# Patient Record
Sex: MALE | Race: WHITE | NOT HISPANIC OR LATINO | Employment: PART TIME | URBAN - NONMETROPOLITAN AREA
[De-identification: names, ages, dates, MRNs, and addresses within clinical notes are randomized per-mention and may not be internally consistent; named-entity substitution may affect disease eponyms.]

---

## 2017-04-27 ENCOUNTER — HOSPITAL ENCOUNTER (EMERGENCY)
Facility: HOSPITAL | Age: 27
Discharge: HOME | End: 2017-04-27
Attending: EMERGENCY MEDICINE | Admitting: EMERGENCY MEDICINE
Payer: OTHER MISCELLANEOUS

## 2017-04-27 VITALS
RESPIRATION RATE: 24 BRPM | OXYGEN SATURATION: 97 % | DIASTOLIC BLOOD PRESSURE: 69 MMHG | HEART RATE: 85 BPM | TEMPERATURE: 97.1 F | SYSTOLIC BLOOD PRESSURE: 139 MMHG

## 2017-04-27 DIAGNOSIS — S05.01XA CORNEAL ABRASION, RIGHT, INITIAL ENCOUNTER: ICD-10-CM
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
## 2017-04-27 PROCEDURE — 70480 CT ORBIT/EAR/FOSSA W/O DYE: CPT | Mod: TC

## 2017-04-27 PROCEDURE — 99284 EMERGENCY DEPT VISIT MOD MDM: CPT | Mod: 25

## 2017-04-27 PROCEDURE — 99284 EMERGENCY DEPT VISIT MOD MDM: CPT | Performed by: EMERGENCY MEDICINE

## 2017-04-27 RX ORDER — NAPROXEN 500 MG/1
500 TABLET ORAL 2 TIMES DAILY WITH MEALS
Qty: 16 TABLET | Refills: 0 | Status: SHIPPED | OUTPATIENT
Start: 2017-04-27 | End: 2017-05-05

## 2017-04-27 RX ORDER — TRAMADOL HYDROCHLORIDE 50 MG/1
50 TABLET ORAL EVERY 6 HOURS PRN
Qty: 10 TABLET | Refills: 0 | Status: SHIPPED | OUTPATIENT
Start: 2017-04-27 | End: 2017-05-13

## 2017-04-27 RX ORDER — SULFACETAMIDE SODIUM 100 MG/ML
1 SOLUTION/ DROPS OPHTHALMIC
Qty: 1 BOTTLE | Refills: 0 | Status: SHIPPED | OUTPATIENT
Start: 2017-04-27 | End: 2017-05-04

## 2017-04-27 ASSESSMENT — ENCOUNTER SYMPTOMS
EYE DISCHARGE: 1
ACTIVITY CHANGE: 1
EYE REDNESS: 1
PHOTOPHOBIA: 1
EYE PAIN: 1

## 2017-04-27 NOTE — DISCHARGE INSTRUCTIONS
Corneal Abrasion    You have received a scratch or scrape (abrasion) to your cornea. The cornea is the clear part in the front of the eye. This sensitive area is very painful when injured. You may make tears frequently, and your vision may be blurry until the injury heals. You may be sensitive to light.  This part of the body heals quickly. You can expect the pain to go away within 24 to 48 hours. If the abrasion is large or deep, your doctor may apply an eye patch, although this is not always done. An antibiotic ointment or eye drops may also be used to prevent infection.  Numbing drops may be used to relieve the pain temporarily so that your eyes can be examined. However, these drops cannot be prescribed for home use because that would prevent healing and lead to more serious problems. Also, if you can t feel your eye, there is a chance of accidentally injuring it further without knowing it.  Home care     A cold pack (ice in a plastic bag, wrapped in a towel) may be applied over the eye (or eye patch) for 20 minutes at a time, to reduce pain.    You may use acetaminophen or ibuprofen to control pain, unless another pain medicine was prescribed. Note: If you have chronic liver or kidney disease or ever had a stomach ulcer or GI bleeding, talk with your doctor before using these medicines.    Rest your eyes and do not read until symptoms are gone.    If you use contact lenses, do not wear them until all symptoms are gone.    If your vision is affected by the corneal abrasion or if an eye patch was applied, do not drive a motor vehicle or operate machinery until all symptoms are gone. You may have trouble judging distances using only one eye.    If your eyes are sensitive to light, try wearing sunglasses, or stay indoors until symptoms go away.  Follow-up care  Follow up with your health care provider, or as advised.    If no patch was put on your eye, and used but the pain continues for more than 48 hours, you  should have another exam. Return to this facility or contact your health care provider to arrange this.    If your eye was patched and you were asked to remove the patch yourself, see your health care provider. You may also return to this facility if you still have pain after the patch is removed.    If you were given a return appointment for patch removal and re-examination, be sure to keep the appointment. Leaving the patch in place longer than advised could be harmful.  When to seek medical advice  Call your health care provider right away if any of these occur.    Increasing eye pain or pain that does not improve after 24 hours    Discharge from the eye    Increasing redness of the eye or swelling of the eyelids    Worsening vision     Symptoms that worsen after the abrasion has healed     9012-1934 [employer]. [address] [place] [address], [place] [address]. All rights reserved. This information is not intended as a substitute for professional medical care. Always follow your healthcare professional's instructions.          Corneal Injury  An eye injury can hurt your cornea. Your cornea is the clear layer on the front of your eye. It protects your eye from dust and germs, and helps filter out harmful UV (ultraviolet) rays. The cornea also helps to focus light entering your eye. Your cornea is made of strong proteins, but it can be damaged. A slight cut or scratch (abrasion) to the cornea can be very painful. But that is often minor and can heal within 1 or 2 days. A bad abrasion or a hole (puncture) in the cornea can be very serious. These are medical emergencies.     A blow to the eye can cause damage to the cornea (front layer of the eye).   Something in your eye  If you think you have something small in your eye, flush it with water right away. Pull your upper lid out and over your bottom lid. This will help increase the flow of tears across your eye. If these methods don t work, call your  healthcare provider. Never try to remove an object from your eye that doesn t flush out easily with water. Doing so may cause more damage.  When to go to the emergency room (ER)  Call 911 or your local emergency number if you have:    Severe eye pain    A puncture injury or bad abrasion    Something in your eye that you can t flush out with water    A very swollen or painful eye after removing an object    A chemical burn    An object embedded in your eye. (Cover both eyes with a sterile compress and keep both eyes closed while you wait for help. DO NOT put any pressure on your eyes.)  What to expect in the ER  For minor abrasions   Minor abrasions are usually treated with eye drops or ointment. You may be given antibiotics to prevent infection. Most abrasions heal in 1 or 2 days. To help rule out more serious injuries, you may have tests including:    A standard eye exam to check how well you can see    A Rocio test, which uses a special dye to look for severe eye damage  Depending on the results of these tests, you may be referred to an eye specialist (ophthalmologist).  For serious abrasions or punctures  You will be referred directly to an ophthalmologist for emergency treatment. An eye specialist is needed to reduce further damage and possible vision loss.    5162-7015 [employer]. [address] [place] [address], [place] [address]. All rights reserved. This information is not intended as a substitute for professional medical care. Always follow your healthcare professional's instructions.      Eddi,  You apparently had some metal or dust cause quite an abrasion across your cental visual area on your right cornea.  This should heal nicely but if there is any ongoing pain or significant redness in the eye come Monday, get in to see an eye doc.  Use 2 extra strength tylenol + the naprosyn twice per day with the pain pill if you need it and the eye drops.  It won't feel perfect but you should be able  to work.  But make sure you shield your eyes with safety glasses or whatever safety you have.  Good luck!

## 2017-04-27 NOTE — ED PROVIDER NOTES
History     Chief Complaint   Patient presents with     Foreign Body in Eye     right eye pain, foreign body possibly metal from work in right eye, since 0250 am.  pt was evaluated in [place] ED and discharged.  about 20 minutes ago he felt something move to back of his eye, vision is foggy in right eye     HPI  Eddi Trejo is a 27 year old male with the above hx.  Works for [employer] as a  here in [place] at the various [employer] relining [employer]ns.  He apparently had a blow back of material including metal in his face early this AM, went to the [place] ER for eval but has had intense continuous pain and comes in now here for second opinion, worried that he has metal that has been driven into his. Feels so tense from the pain, that he is on the verge of developing a migraine.     I have reviewed the Medications, Allergies, Past Medical and Surgical History, and Social History in the Epic system.    Review of Systems   Constitutional: Positive for activity change.   HENT: Negative.    Eyes: Positive for photophobia, pain, discharge, redness and visual disturbance.     Physical Exam   BP: 139/69  Pulse: 85  Temp: 97.1  F (36.2  C)  Resp: 24  SpO2: 97 %  Physical Exam   Constitutional: He appears well-developed and well-nourished. He appears distressed.   Upset angry fellow with foul adjectives as we tried to instill some ophthaine for his comfort.     HENT:   Head: Normocephalic and atraumatic.   Eyes: Pupils are equal, round, and reactive to light. Right eye exhibits discharge. Left eye exhibits discharge.   Blepharospasm, epiphora, intense conjunctival injection, no obvious fb in scleral or palpebral area and under upper tarsal plate.     Neck: Normal range of motion. Neck supple.   Cardiovascular: Normal rate.    Pulmonary/Chest: Effort normal.   Musculoskeletal: Normal range of motion.   Neurological: He is alert.   Skin: Skin is warm. He is not diaphoretic.     ED Course     ED  Course     Procedures  Critical Care time:  none  Labs Ordered and Resulted from Time of ED Arrival Up to the Time of Departure from the ED - No data to display    Assessments & Plan (with Medical Decision Making)   Eddi has a work related eye injury with metallic exposue.  Orbital fine cut CT of the left globe/orbit revealed no metal.  After ubaldo of more opthaine and repeat inspection, fluorescein stain used to identify a central visual axis 2-3mm circular abrasion.  Irrigated clear. Meds for pain and abx prophy below.  Advised no work for tonite if discomfort but should be healed enough by tomorrow nite.  Should see eye MD if symptoms persist in 3 days.   I have reviewed the nursing notes.    I have reviewed the findings, diagnosis, plan and need for follow up with the patient.    New Prescriptions    NAPROXEN (NAPROSYN) 500 MG TABLET    Take 1 tablet (500 mg) by mouth 2 times daily (with meals) for 8 days    SULFACETAMIDE (BLEPH-10) 10 % OPHTHALMIC SOLUTION    Apply 1 drop to eye every 4 hours (while awake) for 7 days    TRAMADOL (ULTRAM) 50 MG TABLET    Take 1 tablet (50 mg) by mouth every 6 hours as needed for pain Maximum 4 tablet(s) per day       Final diagnoses:   Work related injury   Corneal abrasion, right, initial encounter       4/27/2017   [facility]     Austin Monteiro MD  04/27/17 2020

## 2017-04-27 NOTE — ED NOTES
Discharge instructions reviewed with patient, and patient verbalized understanding. Pt declining discharge vital signs at this time. Rx given to patient. Patient verbalized understanding. Pt ambulated with a steady gait to the exit.

## 2017-04-27 NOTE — ED AVS SNAPSHOT
[facility]    [address]    [place] [address]    Phone:  [phone]                                       Eddi Trejo   MRN: [id number]    Department:  [facility]   Date of Visit:  4/27/2017           Patient Information     Date Of Birth          1990        Your diagnoses for this visit were:     Work related injury     Corneal abrasion, right, initial encounter        You were seen by Austin Monteiro MD.      Follow-up Information     Follow up with Corrie Chirinos MD.    Specialty:  Family Practice    Why:  As needed    Contact information:    [facility]  [address]  [place] [address]  [phone]          Discharge Instructions         Corneal Abrasion    You have received a scratch or scrape (abrasion) to your cornea. The cornea is the clear part in the front of the eye. This sensitive area is very painful when injured. You may make tears frequently, and your vision may be blurry until the injury heals. You may be sensitive to light.  This part of the body heals quickly. You can expect the pain to go away within 24 to 48 hours. If the abrasion is large or deep, your doctor may apply an eye patch, although this is not always done. An antibiotic ointment or eye drops may also be used to prevent infection.  Numbing drops may be used to relieve the pain temporarily so that your eyes can be examined. However, these drops cannot be prescribed for home use because that would prevent healing and lead to more serious problems. Also, if you can t feel your eye, there is a chance of accidentally injuring it further without knowing it.  Home care     A cold pack (ice in a plastic bag, wrapped in a towel) may be applied over the eye (or eye patch) for 20 minutes at a time, to reduce pain.    You may use acetaminophen or ibuprofen to control pain, unless another pain medicine was prescribed. Note: If you have chronic liver or kidney disease or ever  had a stomach ulcer or GI bleeding, talk with your doctor before using these medicines.    Rest your eyes and do not read until symptoms are gone.    If you use contact lenses, do not wear them until all symptoms are gone.    If your vision is affected by the corneal abrasion or if an eye patch was applied, do not drive a motor vehicle or operate machinery until all symptoms are gone. You may have trouble judging distances using only one eye.    If your eyes are sensitive to light, try wearing sunglasses, or stay indoors until symptoms go away.  Follow-up care  Follow up with your health care provider, or as advised.    If no patch was put on your eye, and used but the pain continues for more than 48 hours, you should have another exam. Return to this facility or contact your health care provider to arrange this.    If your eye was patched and you were asked to remove the patch yourself, see your health care provider. You may also return to this facility if you still have pain after the patch is removed.    If you were given a return appointment for patch removal and re-examination, be sure to keep the appointment. Leaving the patch in place longer than advised could be harmful.  When to seek medical advice  Call your health care provider right away if any of these occur.    Increasing eye pain or pain that does not improve after 24 hours    Discharge from the eye    Increasing redness of the eye or swelling of the eyelids    Worsening vision     Symptoms that worsen after the abrasion has healed     2716-3135 [employer]. [address] [place] [address], [place] [address]. All rights reserved. This information is not intended as a substitute for professional medical care. Always follow your healthcare professional's instructions.          Corneal Injury  An eye injury can hurt your cornea. Your cornea is the clear layer on the front of your eye. It protects your eye from dust and germs, and helps filter out  harmful UV (ultraviolet) rays. The cornea also helps to focus light entering your eye. Your cornea is made of strong proteins, but it can be damaged. A slight cut or scratch (abrasion) to the cornea can be very painful. But that is often minor and can heal within 1 or 2 days. A bad abrasion or a hole (puncture) in the cornea can be very serious. These are medical emergencies.     A blow to the eye can cause damage to the cornea (front layer of the eye).   Something in your eye  If you think you have something small in your eye, flush it with water right away. Pull your upper lid out and over your bottom lid. This will help increase the flow of tears across your eye. If these methods don t work, call your healthcare provider. Never try to remove an object from your eye that doesn t flush out easily with water. Doing so may cause more damage.  When to go to the emergency room (ER)  Call 911 or your local emergency number if you have:    Severe eye pain    A puncture injury or bad abrasion    Something in your eye that you can t flush out with water    A very swollen or painful eye after removing an object    A chemical burn    An object embedded in your eye. (Cover both eyes with a sterile compress and keep both eyes closed while you wait for help. DO NOT put any pressure on your eyes.)  What to expect in the ER  For minor abrasions   Minor abrasions are usually treated with eye drops or ointment. You may be given antibiotics to prevent infection. Most abrasions heal in 1 or 2 days. To help rule out more serious injuries, you may have tests including:    A standard eye exam to check how well you can see    A Rocio test, which uses a special dye to look for severe eye damage  Depending on the results of these tests, you may be referred to an eye specialist (ophthalmologist).  For serious abrasions or punctures  You will be referred directly to an ophthalmologist for emergency treatment. An eye specialist is needed to  reduce further damage and possible vision loss.    6063-0502 [employer]. [address] [place] [address], [place] [address]. All rights reserved. This information is not intended as a substitute for professional medical care. Always follow your healthcare professional's instructions.      Eddi,  You apparently had some metal or dust cause quite an abrasion across your cental visual area on your right cornea.  This should heal nicely but if there is any ongoing pain or significant redness in the eye come Monday, get in to see an eye doc.  Use 2 extra strength tylenol + the naprosyn twice per day with the pain pill if you need it and the eye drops.  It won't feel perfect but you should be able to work.  But make sure you shield your eyes with safety glasses or whatever safety you have.  Good luck!       Review of your medicines      START taking        Dose / Directions Last dose taken    naproxen 500 MG tablet   Commonly known as:  NAPROSYN   Dose:  500 mg   Quantity:  16 tablet        Take 1 tablet (500 mg) by mouth 2 times daily (with meals) for 8 days   Refills:  0        sulfacetamide 10 % ophthalmic solution   Commonly known as:  BLEPH-10   Dose:  1 drop   Quantity:  1 Bottle        Apply 1 drop to eye every 4 hours (while awake) for 7 days   Refills:  0        traMADol 50 MG tablet   Commonly known as:  ULTRAM   Dose:  50 mg   Quantity:  10 tablet        Take 1 tablet (50 mg) by mouth every 6 hours as needed for pain Maximum 4 tablet(s) per day   Refills:  0          Our records show that you are taking the medicines listed below. If these are incorrect, please call your family doctor or clinic.        Dose / Directions Last dose taken    azithromycin 250 MG tablet   Commonly known as:  ZITHROMAX   Quantity:  6 tablet        Two tablets first day, then one tablet daily for four days.   Refills:  0        fluticasone 50 MCG/ACT spray   Commonly known as:  FLONASE   Dose:  2 spray   Quantity:  16 g  "       Spray 2 sprays into both nostrils 2 times daily   Refills:  11        VITAMIN C PO   Dose:  1000 mg        Take 1,000 mg by mouth daily   Refills:  0                Prescriptions were sent or printed at these locations (3 Prescriptions)                   Other Prescriptions                Printed at Department/Unit printer (3 of 3)         sulfacetamide (BLEPH-10) 10 % ophthalmic solution               naproxen (NAPROSYN) 500 MG tablet               traMADol (ULTRAM) 50 MG tablet                Procedures and tests performed during your visit     CT Temporal Orbital Sella w/o Contrast      Orders Needing Specimen Collection     None      Pending Results     Date and Time Order Name Status Description    2017 1647 CT Temporal Orbital Sella w/o Contrast In process             Pending Culture Results     No orders found from 2017 to 2017.            Thank you for choosing [facility]       Thank you for choosing [facility] for your care. Our goal is always to provide you with excellent care. Hearing back from our patients is one way we can continue to improve our services. Please take a few minutes to complete the written survey that you may receive in the mail after you visit with us. Thank you!        [employer] Information     [employer] lets you send messages to your doctor, view your test results, renew your prescriptions, schedule appointments and more. To sign up, go to [email or web address] . Click on \"Log in\" on the left side of the screen, which will take you to the Welcome page. Then click on \"Sign up Now\" on the right side of the page.     You will be asked to enter the access code listed below, as well as some personal information. Please follow the directions to create your username and password.     Your access code is: [email or web address]  Expires: 2017  5:37 PM     Your access code will  in 90 days. If you need help or a new code, please call your [facility] clinic or [phone].      "   Care EveryWhere ID     This is your Care EveryWhere ID. This could be used by other organizations to access your [facility] medical records  [id number]        After Visit Summary       This is your record. Keep this with you and show to your community pharmacist(s) and doctor(s) at your next visit.

## 2017-04-27 NOTE — ED AVS SNAPSHOT
[facility]    [address]    [place] [address]    Phone:  [phone]                                       Eddi Trejo   MRN: [id number]    Department:  [facility]   Date of Visit:  4/27/2017           After Visit Summary Signature Page     I have received my discharge instructions, and my questions have been answered. I have discussed any challenges I see with this plan with the nurse or doctor.    ..........................................................................................................................................  Patient/Patient Representative Signature      ..........................................................................................................................................  Patient Representative Print Name and Relationship to Patient    ..................................................               ................................................  Date                                            Time    ..........................................................................................................................................  Reviewed by Signature/Title    ...................................................              ..............................................  Date                                                            Time

## 2017-04-28 NOTE — PROGRESS NOTES
CT Scan of Orbits report routed to PCP, Dr. SILVIA Chirinos.  Impression - droplet of gas seen above the lens of the right eye, possibility of a small tear in the sclera.  Should see eye MD if symptoms persist in 3 days.

## 2017-05-13 ENCOUNTER — HOSPITAL ENCOUNTER (EMERGENCY)
Facility: HOSPITAL | Age: 27
Discharge: HOME | End: 2017-05-13
Attending: PHYSICIAN ASSISTANT | Admitting: PHYSICIAN ASSISTANT
Payer: COMMERCIAL

## 2017-05-13 VITALS
HEART RATE: 68 BPM | TEMPERATURE: 97.9 F | DIASTOLIC BLOOD PRESSURE: 95 MMHG | RESPIRATION RATE: 12 BRPM | OXYGEN SATURATION: 98 % | SYSTOLIC BLOOD PRESSURE: 143 MMHG

## 2017-05-13 DIAGNOSIS — M25.462 KNEE EFFUSION, LEFT: ICD-10-CM

## 2017-05-13 DIAGNOSIS — S86.912A STRAIN OF LEFT KNEE, INITIAL ENCOUNTER: ICD-10-CM

## 2017-05-13 PROCEDURE — 99213 OFFICE O/P EST LOW 20 MIN: CPT | Performed by: PHYSICIAN ASSISTANT

## 2017-05-13 PROCEDURE — 73562 X-RAY EXAM OF KNEE 3: CPT | Mod: TC,LT

## 2017-05-13 PROCEDURE — 99213 OFFICE O/P EST LOW 20 MIN: CPT

## 2017-05-13 ASSESSMENT — ENCOUNTER SYMPTOMS
CONSTITUTIONAL NEGATIVE: 1
CARDIOVASCULAR NEGATIVE: 1
ARTHRALGIAS: 1
PSYCHIATRIC NEGATIVE: 1
NEUROLOGICAL NEGATIVE: 1
JOINT SWELLING: 1
MYALGIAS: 1

## 2017-05-13 NOTE — ED AVS SNAPSHOT
[facility]    [address]    [place] [address]    Phone:  [phone]                                       Eddi Trejo   MRN: [id number]    Department:  [facility]   Date of Visit:  5/13/2017           Patient Information     Date Of Birth          1990        Your diagnoses for this visit were:     Knee effusion, left     Strain of left knee, initial encounter        You were seen by Loretta Paredes PA.      Follow-up Information     Follow up with Corrie Chirinos MD.    Specialty:  Family Practice    Why:  If symptoms worsen, prior to your Orthopedic Consultation    Contact information:    [facility] [place]  [address]  [place] [address] [phone]          Follow up with [facility].    Specialty:  EMERGENCY MEDICINE    Why:  If further concerns develop    Contact information:    [address] [address] [phone]    Additional information:    From [facility]: Take [address]. Turn left at [address]. Turn left at the first stoplight on [address]. At the first stop sign, take a right onto [address]. Take a left into the parking lot and continue through until you reach the North enterance of the building.       From [place]: Take [address]. Take the [address] ramp towards [place]. Turn left onto [address]. Take a slight right onto [address]. Turn left at the first stoplight on [address]. At the first stop sign, take a right onto [address]. Take a left into the parking lot and continue through until you reach the North enterance of the building.       From [place]: Take [address]. Take a right at [address]. At the first stop sign, take a right onto [address]. Take a left into the parking lot and continue through until you reach the North enterance of the building.         Discharge Instructions       Motrin  "800 mg every 8 hours for pain/swelling.    Discharge References/Attachments     ACE WRAP (ENGLISH)    KNEE EFFUSION (ENGLISH)    KNEE PAIN AND SWELLING, REDUCING (ENGLISH)    CRUTCHES (WEIGHT-BEARING), DISCHARGE INSTRUCTIONS (ENGLISH)      ED Discharge Orders     ORTHOPEDICS ADULT REFERRAL       Your provider has referred you to: [facility]    Please be aware that coverage of these services is subject to the terms and limitations of your health insurance plan.  Call member services at your health plan with any benefit or coverage questions.      Please bring the following to your appointment:    >>   Any x-rays, CTs or MRIs which have been performed.  Contact the facility where they were done to arrange for  prior to your scheduled appointment.    >>   List of current medications   >>   This referral request   >>   Any documents/labs given to you for this referral                     Review of your medicines      Notice     You have not been prescribed any medications.            Procedures and tests performed during your visit     Knee XR, 3 views, left      Orders Needing Specimen Collection     None      Pending Results     Date and Time Order Name Status Description    5/13/2017 1557 Knee XR, 3 views, left In process             Pending Culture Results     No orders found from 5/11/2017 to 5/14/2017.            Thank you for choosing [facility]       Thank you for choosing [facility] for your care. Our goal is always to provide you with excellent care. Hearing back from our patients is one way we can continue to improve our services. Please take a few minutes to complete the written survey that you may receive in the mail after you visit with us. Thank you!        [employer] Information     [employer] lets you send messages to your doctor, view your test results, renew your prescriptions, schedule appointments and more. To sign up, go to [email or web address] . Click on \"Log in\" on the left side " "of the screen, which will take you to the Welcome page. Then click on \"Sign up Now\" on the right side of the page.     You will be asked to enter the access code listed below, as well as some personal information. Please follow the directions to create your username and password.     Your access code is: [email or web address]  Expires: 2017  5:37 PM     Your access code will  in 90 days. If you need help or a new code, please call your [facility] clinic or [phone].        Care EveryWhere ID     This is your Care EveryWhere ID. This could be used by other organizations to access your [facility] medical records  [id number]        After Visit Summary       This is your record. Keep this with you and show to your community pharmacist(s) and doctor(s) at your next visit.                  "

## 2017-05-13 NOTE — LETTER
[facility]  [address]  [place] [address]  Phone: [phone]    May 13, 2017        Eddi Trejo  [address]  [facility] [address]          To whom it may concern:    RE: Eddi Trejo    Patient was seen and treated today at our clinic.    Due to injury, Adrien requires crutches for ambulation through 23 May 2017.    Any further work limitations to be completed by your Primary Care or Orthopedic Provider.      Sincerely,      Loretta Paredes Certified  Physician Assistant  5/13/2017  4:26 PM  URGENT CARE CLINIC

## 2017-05-13 NOTE — ED PROVIDER NOTES
"  History     Chief Complaint   Patient presents with     Knee Pain     Left knee pain since yesterday. Patient states, \"I think it slipped out of place.\"     The history is provided by the patient. No  was used.     Eddi Trejo is a 27 year old male who has acute exacerbation of chronic left knee pain. Denies direct trauma. States he was sitting, while driving. States he went to stretch his left leg and he felt like his left knee \"popped out of place.\" since then he has sharp lateral knee pain which goes down the lateral side of his calf.     Allergies as of 2017 - Kaden as Reviewed 2017   Allergen Reaction Noted     Penicillins  2013     Patient's Medications   New Prescriptions    No medications on file   Previous Medications    No medications on file   Modified Medications    No medications on file   Discontinued Medications    ASCORBIC ACID (VITAMIN C PO)    Take 1,000 mg by mouth daily    AZITHROMYCIN (ZITHROMAX) 250 MG TABLET    Two tablets first day, then one tablet daily for four days.    FLUTICASONE (FLONASE) 50 MCG/ACT NASAL SPRAY    Spray 2 sprays into both nostrils 2 times daily    TRAMADOL (ULTRAM) 50 MG TABLET    Take 1 tablet (50 mg) by mouth every 6 hours as needed for pain Maximum 4 tablet(s) per day     Past Medical History:   Diagnosis Date     Bronchitis 02     Headache 4/28/10     Open wound of forehead 4/28/10     Otitis 02     Schizophrenia (H) 4/28/10     URI (upper respiratory infection) 95     Past Surgical History:   Procedure Laterality Date     HC CREATE EARDRUM OPENING,GEN ANESTH      PE tubes as child     Family History   Problem Relation Age of Onset     C.A.D. No family hx of      Hypertension No family hx of      Colon Cancer Maternal Grandfather       at 58     Breast Cancer Maternal Grandmother      DIABETES Paternal Grandmother      DIABETES Paternal Aunt      DIABETES Maternal Aunt      Asthma Mother      Social " History     Social History     Marital status: Single     Spouse name: N/A     Number of children: N/A     Years of education: N/A     Social History Main Topics     Smoking status: Current Every Day Smoker     Packs/day: 1.00     Types: Cigarettes     Smokeless tobacco: Never Used     Alcohol use No     Drug use: No     Sexual activity: Not Asked     Other Topics Concern     Parent/Sibling W/ Cabg, Mi Or Angioplasty Before 65f 55m? No     Social History Narrative         I have reviewed the Medications, Allergies, Past Medical and Surgical History, and Social History in the Epic system.    Review of Systems   Constitutional: Negative.    Cardiovascular: Negative.    Musculoskeletal: Positive for arthralgias, joint swelling and myalgias.   Neurological: Negative.    Psychiatric/Behavioral: Negative.        Physical Exam   BP: 143/95  Pulse: 68  Temp: 97.9  F (36.6  C)  Resp: 12  SpO2: 98 %  Physical Exam   Constitutional: He is oriented to person, place, and time. He appears well-developed and well-nourished. No distress.   Cardiovascular: Normal rate.    Pulmonary/Chest: Effort normal.   Musculoskeletal:   Left knee: neg v/v/d. M/n/v intact. 3/5 strength due to pain. Medial effusion noted with moderate TTP. Moderate TTP along the lateral joint line.    Neurological: He is alert and oriented to person, place, and time.   Skin: He is not diaphoretic.   Psychiatric: He has a normal mood and affect.   Nursing note and vitals reviewed.      ED Course     ED Course     Procedures      left knee xray:  No acute process noted,  Pending official rad results    Ace wrap and fitted for and educated on crutches.      Assessments & Plan (with Medical Decision Making)     I have reviewed the nursing notes.    I have reviewed the findings, diagnosis, plan and need for follow up with the patient.    Final diagnoses:   Knee effusion, left   Strain of left knee, initial encounter         Elevate injured area above heart as often as  possible and when resting. Take OTC motrin 800 mg every 8 hours as needed for pain/swelling. Apply ice at least three times a day x 20 minutes.   Patient verbally educated and given appropriate education sheets for the diagnoses and has no questions.  Wear ace wrap and crutches x 10 days. Note given. All further limitations to be completed by PCP or Orthopedic provider.  Orthopedic Consult ordered  Follow up with your Primary Care provider if symptoms increase or if concerns develop, return to the ER  Loretta Paredes Certified   Physician Assistant  5/13/2017  4:32 PM  UR[facility]ENT CARE CLINIC    5/13/2017   [facility]     Loretta Paredes PA  05/13/17 1637       Loretta Paredes PA  05/13/17 1637

## 2017-05-13 NOTE — ED NOTES
Patient came in with left knee pain 8/10. He thinks he popped his knee out of place. Been taking tylenol/ Ibuprofen for the pain.

## 2017-05-13 NOTE — ED AVS SNAPSHOT
[facility]    [address]    [place] [address]    Phone:  [phone]                                       Eddi Trejo   MRN: [id number]    Department:  [facility]   Date of Visit:  5/13/2017           After Visit Summary Signature Page     I have received my discharge instructions, and my questions have been answered. I have discussed any challenges I see with this plan with the nurse or doctor.    ..........................................................................................................................................  Patient/Patient Representative Signature      ..........................................................................................................................................  Patient Representative Print Name and Relationship to Patient    ..................................................               ................................................  Date                                            Time    ..........................................................................................................................................  Reviewed by Signature/Title    ...................................................              ..............................................  Date                                                            Time

## 2017-05-17 ENCOUNTER — TELEPHONE (OUTPATIENT)
Dept: ORTHOPEDICS | Facility: OTHER | Age: 27
End: 2017-05-17

## 2017-05-17 NOTE — TELEPHONE ENCOUNTER
Marielle, not sure if this is work comp. Would you be able to look into it for me. Was seen in the ER. I do have him scheduled with   Raul Gillis on 5/23/17 pacheco Hooper

## 2017-07-07 ENCOUNTER — HOSPITAL ENCOUNTER (EMERGENCY)
Facility: HOSPITAL | Age: 27
Discharge: HOME | End: 2017-07-07
Attending: NURSE PRACTITIONER | Admitting: NURSE PRACTITIONER
Payer: COMMERCIAL

## 2017-07-07 VITALS
TEMPERATURE: 97.2 F | RESPIRATION RATE: 16 BRPM | SYSTOLIC BLOOD PRESSURE: 137 MMHG | OXYGEN SATURATION: 99 % | DIASTOLIC BLOOD PRESSURE: 86 MMHG

## 2017-07-07 DIAGNOSIS — G89.29 CHRONIC PAIN OF LEFT KNEE: ICD-10-CM

## 2017-07-07 DIAGNOSIS — M25.562 CHRONIC PAIN OF LEFT KNEE: ICD-10-CM

## 2017-07-07 PROCEDURE — 25000128 H RX IP 250 OP 636: Performed by: NURSE PRACTITIONER

## 2017-07-07 PROCEDURE — 99213 OFFICE O/P EST LOW 20 MIN: CPT | Performed by: NURSE PRACTITIONER

## 2017-07-07 PROCEDURE — 96372 THER/PROPH/DIAG INJ SC/IM: CPT

## 2017-07-07 PROCEDURE — 73562 X-RAY EXAM OF KNEE 3: CPT | Mod: TC,LT

## 2017-07-07 PROCEDURE — 99214 OFFICE O/P EST MOD 30 MIN: CPT | Mod: 25

## 2017-07-07 RX ORDER — NAPROXEN 500 MG/1
500 TABLET ORAL 2 TIMES DAILY WITH MEALS
Qty: 20 TABLET | Refills: 0 | Status: SHIPPED | OUTPATIENT
Start: 2017-07-07 | End: 2017-11-09

## 2017-07-07 RX ORDER — KETOROLAC TROMETHAMINE 30 MG/ML
60 INJECTION, SOLUTION INTRAMUSCULAR; INTRAVENOUS ONCE
Status: COMPLETED | OUTPATIENT
Start: 2017-07-07 | End: 2017-07-07

## 2017-07-07 RX ADMIN — KETOROLAC TROMETHAMINE 60 MG: 30 INJECTION, SOLUTION INTRAMUSCULAR at 12:32

## 2017-07-07 ASSESSMENT — ENCOUNTER SYMPTOMS
WOUND: 0
COLOR CHANGE: 0
ARTHRALGIAS: 1
NUMBNESS: 0
CONSTITUTIONAL NEGATIVE: 1

## 2017-07-07 NOTE — ED AVS SNAPSHOT
[facility]    [address] [address]    Phone:  [phone]                                       Eddi Trejo   MRN: [id number]    Department:  [facility]   Date of Visit:  7/7/2017           Patient Information     Date Of Birth          1990        Your diagnoses for this visit were:     Chronic pain of left knee suspect lateral meniscus tear       You were seen by Soledad Rucker NP.      Follow-up Information     Follow up with [facility].    Specialty:  EMERGENCY MEDICINE    Why:  As needed, If symptoms worsen, or concerns develop    Contact information:    [address] [place] [address] [phone]    Additional information:    From [facility]: Take [address]. Turn left at [address]. Turn left at the first stoplight on [address]. At the first stop sign, take a right onto [address]. Take a left into the parking lot and continue through until you reach the North enterance of the building.       From [place]: Take [address]. Take the [address] ramp towards [place]. Turn left onto [address]. Take a slight right onto [address]. Turn left at the first stoplight on [address]. At the first stop sign, take a right onto [address]. Take a left into the parking lot and continue through until you reach the North enterance of the building.       From [place]: Take [address]. Take a right at [address]. At the first stop sign, take a right onto [address]. Take a left into the parking lot and continue through until you reach the North enterance of the building.         Follow up with Corrie Chirinos MD.    Specialty:  Family Practice    Why:  As needed, if symptoms do not improve    Contact information:    [facility] [place]  [address]  [place] [address]  [phone]          Follow up with Noel Moralez MD.     Specialty:  Orthopedics    Why:  they will call you to schedule an appointment    Contact information:    [facility]  [address]  [place] [address]  [phone]          Discharge Instructions             Knee Pain  Knee pain is very common. It s especially common in active people who put a lot of pressure on their knees, like runners. It affects women more often than men.  Your kneecap (patella) is a thick, round bone. It covers and protects the front portion of your knee joint. It moves along a groove in your thighbone (femur) as part of the patellofemoral joint. A layer of cartilage surrounds the underside of your kneecap. This layer protects it from grinding against your femur.  When this cartilage softens and breaks down, it can cause knee pain. This is partly because of repetitive stress. The stress irritates the lining of the joint. This causes pain in the underlying bone.  What causes knee pain?  Many things can cause knee pain. You may have more than one cause. Some of these include:    Overuse of the knee joint    The kneecap doesn t line up with the tissue around it    Damage to small nerves in the area    Damage to the ligament-like structure that holds the kneecap in place (retinaculum)    Breakdown of the bone under the cartilage    Swelling in the soft tissues around the kneecap    Injury  You might be more likely to have knee pain if you:    Exercise a lot    Recently increased the intensity of your workouts    Have a body mass index (BMI) greater than 25    Have poor alignment of your kneecap    Walk with your feet turned overly outward or inward    Have weakness in surrounding muscle groups (inner quad or hip adductor muscles)    Have too much tightness in surrounding muscle groups (hamstrings or iliotibial band)    Have a recent history of injury to the area    Are female  Symptoms of knee pain  This type of knee pain is a dull, aching pain in the front of the knee in the  area under and around the kneecap. This pain may start quickly or slowly. Your pain might be worse when you squat, run, or sit for a long time. You might also sometimes feel like your knee is giving out. You may have symptoms in one or both of your knees.  Diagnosing knee pain  Your health care provider will ask about your medical history and your symptoms. Be sure to describe any activities that make your knee pain worse. He or she will look at your knee. This will include tests of your range of motion, strength, and areas of pain of your knee. Your knee alignment will be checked.  Your health care provider will need to rule out other causes of your knee pain, such as arthritis. You may need an imaging test, such as an X-ray or MRI.  Treatment for knee pain  Treatments that can help ease your symptoms may include:    Avoiding activities for a while that make your pain worse, returning to activity over time    Icing the outside of your knee when it causes you pain    Taking over-the-counter pain medicine    Wearing a knee brace or taping your knee to support it    Wearing special shoe inserts to help keep your feet in the proper alignment    Doing special exercises to stretch and strengthen the muscles around your hip and your knee  These steps help most people manage knee pain. But some cases of knee pain need to be treated with surgery. You may need surgery right away. Or you may need it later if other treatments don t work. Your health care provider may refer you to an orthopedic surgeon. He or she will talk with you about your choices.  Preventing knee pain  Losing weight and correcting excess muscle tightness or muscle weakness may help lower your risk.  In some cases, you can prevent knee pain. To help prevent a flare-up of knee pain, you do these things:    Regularly do all the exercises your doctor or physical therapist advises    Support your knee as advised by your doctor or physical therapist    Increase  training gradually, and ease up on training when needed    Have an expert check your gait for running or other sporting activities    Stretch properly before and after exercise    Replace your running shoes regularly    Lose excess weight     When to call your health care provider  Call your health care provider right away if:    Your symptoms don t get better after a few weeks of treatment    You have any new symptoms   Date Last Reviewed: 3/19/2015    2105-1897 [employer]. [address] [place] [address], [place] [id number]. All rights reserved. This information is not intended as a substitute for professional medical care. Always follow your healthcare professional's instructions.          Reducing Knee Pain and Swelling    Many treatments can help reduce pain and swelling in your knee. Your healthcare provider or physical therapist may suggest one or more of the following treatments:    Icing your knee helps reduce swelling. You may be asked to ice your knee once a day or more. Apply ice for about 15 to 20 minutes at a time, with at least 40 minutes between sessions. Always keep a towel between the ice and your skin.     Keeping your leg raised above your heart helps excess fluid flow out of your knee joint. This reduces swelling.    Compression means wrapping an elastic bandage or neoprene sleeve snugly around your knees. This keeps fluid from collecting in your knee joint.    Electrical stimulation, done by a physical therapist or , can help reduce excess fluid in your knee joint.    Anti-inflammatory medicines may be prescribed by your healthcare provider. You may take pills or receive injections in your knee.    Isometric (daisy) exercises strengthen the muscles that support your knee joint. They also help reduce excess fluid in your knee.    Massage helps fluid drain away from your knee.  Date Last Reviewed: 10/13/2015    3807-4300 [employer]. [address] [place] [address]  Road, [place] [address]. All rights reserved. This information is not intended as a substitute for professional medical care. Always follow your healthcare professional's instructions.          Discharge References/Attachments     MENISCAL TEARS, UNDERSTANDING (ENGLISH)      ED Discharge Orders     ORTHOPEDICS ADULT REFERRAL       Your provider has referred you to: Dr Moralez or Dr Pritchard    Please be aware that coverage of these services is subject to the terms and limitations of your health insurance plan.  Call member services at your health plan with any benefit or coverage questions.      Please bring the following to your appointment:    >>   Any x-rays, CTs or MRIs which have been performed.  Contact the facility where they were done to arrange for  prior to your scheduled appointment.    >>   List of current medications   >>   This referral request   >>   Any documents/labs given to you for this referral                     Review of your medicines      START taking        Dose / Directions Last dose taken    naproxen 500 MG tablet   Commonly known as:  NAPROSYN   Dose:  500 mg   Quantity:  20 tablet        Take 1 tablet (500 mg) by mouth 2 times daily (with meals)   Refills:  0                Prescriptions were sent or printed at these locations (1 Prescription)                   [id number] - [place] - [address] [address] [address] [facility] [place] [address]    Telephone:  [phone]   Fax:  [phone]   Hours:                  E-Prescribed (1 of 1)         naproxen (NAPROSYN) 500 MG tablet                Procedures and tests performed during your visit     Knee XR, 3 views, left      Orders Needing Specimen Collection     None      Pending Results     Date and Time Order Name Status Description    7/7/2017 1207 Knee XR, 3 views, left In process             Pending Culture Results     No orders found from 7/5/2017 to 7/8/2017.            Thank you for choosing [facility]      "  Thank you for choosing [facility] for your care. Our goal is always to provide you with excellent care. Hearing back from our patients is one way we can continue to improve our services. Please take a few minutes to complete the written survey that you may receive in the mail after you visit with us. Thank you!        [employer] Information     [employer] lets you send messages to your doctor, view your test results, renew your prescriptions, schedule appointments and more. To sign up, go to [email or web address] . Click on \"Log in\" on the left side of the screen, which will take you to the Welcome page. Then click on \"Sign up Now\" on the right side of the page.     You will be asked to enter the access code listed below, as well as some personal information. Please follow the directions to create your username and password.     Your access code is: [email or web address]  Expires: 2017  5:37 PM     Your access code will  in 90 days. If you need help or a new code, please call your [facility] clinic or [phone].        Care EveryWhere ID     This is your Care EveryWhere ID. This could be used by other organizations to access your [facility] medical records  [id number]        Equal Access to Services     ASTON HARDEN : Nell Ruelas, washawnada asaf, qaybta kaalmada adeadenike, ciera leyva. So [facility] [phone].    ATENCIÓN: Si habla español, tiene a olivier disposición servicios gratuitos de asistencia lingüística. Llame al [phone].    We comply with applicable federal civil rights laws and [place] laws. We do not discriminate on the basis of race, color, national origin, age, disability sex, sexual orientation or gender identity.            After Visit Summary       This is your record. Keep this with you and show to your community pharmacist(s) and doctor(s) at your next visit.                  "

## 2017-07-07 NOTE — LETTER
[facility]  [address] [place] [address]  [place] [address] [phone]    2017    Eddi Trejo  [address]  [place] [address]  [phone] (home)     : 1990      To Whom it may concern:    Eddi Trejo was seen in our Urgent Care Department today, 2017.  I expect his condition to improve over the next 1-2 days.  He may return to work/school when improved.    Sincerely,        Soledad Rucker

## 2017-07-07 NOTE — ED AVS SNAPSHOT
[facility]    [address]    [place] [address]    Phone:  [phone]                                       Eddi Trejo   MRN: [id number]    Department:  [facility]   Date of Visit:  7/7/2017           After Visit Summary Signature Page     I have received my discharge instructions, and my questions have been answered. I have discussed any challenges I see with this plan with the nurse or doctor.    ..........................................................................................................................................  Patient/Patient Representative Signature      ..........................................................................................................................................  Patient Representative Print Name and Relationship to Patient    ..................................................               ................................................  Date                                            Time    ..........................................................................................................................................  Reviewed by Signature/Title    ...................................................              ..............................................  Date                                                            Time
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
## 2017-07-07 NOTE — DISCHARGE INSTRUCTIONS
Knee Pain  Knee pain is very common. It s especially common in active people who put a lot of pressure on their knees, like runners. It affects women more often than men.  Your kneecap (patella) is a thick, round bone. It covers and protects the front portion of your knee joint. It moves along a groove in your thighbone (femur) as part of the patellofemoral joint. A layer of cartilage surrounds the underside of your kneecap. This layer protects it from grinding against your femur.  When this cartilage softens and breaks down, it can cause knee pain. This is partly because of repetitive stress. The stress irritates the lining of the joint. This causes pain in the underlying bone.  What causes knee pain?  Many things can cause knee pain. You may have more than one cause. Some of these include:    Overuse of the knee joint    The kneecap doesn t line up with the tissue around it    Damage to small nerves in the area    Damage to the ligament-like structure that holds the kneecap in place (retinaculum)    Breakdown of the bone under the cartilage    Swelling in the soft tissues around the kneecap    Injury  You might be more likely to have knee pain if you:    Exercise a lot    Recently increased the intensity of your workouts    Have a body mass index (BMI) greater than 25    Have poor alignment of your kneecap    Walk with your feet turned overly outward or inward    Have weakness in surrounding muscle groups (inner quad or hip adductor muscles)    Have too much tightness in surrounding muscle groups (hamstrings or iliotibial band)    Have a recent history of injury to the area    Are female  Symptoms of knee pain  This type of knee pain is a dull, aching pain in the front of the knee in the area under and around the kneecap. This pain may start quickly or slowly. Your pain might be worse when you squat, run, or sit for a long time. You might also sometimes feel like your knee is giving out. You may have  symptoms in one or both of your knees.  Diagnosing knee pain  Your health care provider will ask about your medical history and your symptoms. Be sure to describe any activities that make your knee pain worse. He or she will look at your knee. This will include tests of your range of motion, strength, and areas of pain of your knee. Your knee alignment will be checked.  Your health care provider will need to rule out other causes of your knee pain, such as arthritis. You may need an imaging test, such as an X-ray or MRI.  Treatment for knee pain  Treatments that can help ease your symptoms may include:    Avoiding activities for a while that make your pain worse, returning to activity over time    Icing the outside of your knee when it causes you pain    Taking over-the-counter pain medicine    Wearing a knee brace or taping your knee to support it    Wearing special shoe inserts to help keep your feet in the proper alignment    Doing special exercises to stretch and strengthen the muscles around your hip and your knee  These steps help most people manage knee pain. But some cases of knee pain need to be treated with surgery. You may need surgery right away. Or you may need it later if other treatments don t work. Your health care provider may refer you to an orthopedic surgeon. He or she will talk with you about your choices.  Preventing knee pain  Losing weight and correcting excess muscle tightness or muscle weakness may help lower your risk.  In some cases, you can prevent knee pain. To help prevent a flare-up of knee pain, you do these things:    Regularly do all the exercises your doctor or physical therapist advises    Support your knee as advised by your doctor or physical therapist    Increase training gradually, and ease up on training when needed    Have an expert check your gait for running or other sporting activities    Stretch properly before and after exercise    Replace your running shoes  regularly    Lose excess weight     When to call your health care provider  Call your health care provider right away if:    Your symptoms don t get better after a few weeks of treatment    You have any new symptoms   Date Last Reviewed: 3/19/2015    4114-4286 [employer]. [address] [place] [address]. All rights reserved. This information is not intended as a substitute for professional medical care. Always follow your healthcare professional's instructions.          Reducing Knee Pain and Swelling    Many treatments can help reduce pain and swelling in your knee. Your healthcare provider or physical therapist may suggest one or more of the following treatments:    Icing your knee helps reduce swelling. You may be asked to ice your knee once a day or more. Apply ice for about 15 to 20 minutes at a time, with at least 40 minutes between sessions. Always keep a towel between the ice and your skin.     Keeping your leg raised above your heart helps excess fluid flow out of your knee joint. This reduces swelling.    Compression means wrapping an elastic bandage or neoprene sleeve snugly around your knees. This keeps fluid from collecting in your knee joint.    Electrical stimulation, done by a physical therapist or , can help reduce excess fluid in your knee joint.    Anti-inflammatory medicines may be prescribed by your healthcare provider. You may take pills or receive injections in your knee.    Isometric (daisy) exercises strengthen the muscles that support your knee joint. They also help reduce excess fluid in your knee.    Massage helps fluid drain away from your knee.  Date Last Reviewed: 10/13/2015    4469-2132 [employer]. [address] [place] [address]. All rights reserved. This information is not intended as a substitute for professional medical care. Always follow your healthcare professional's instructions.

## 2017-07-19 ENCOUNTER — OFFICE VISIT (OUTPATIENT)
Dept: ORTHOPEDICS | Facility: OTHER | Age: 27
End: 2017-07-19
Attending: NURSE PRACTITIONER
Payer: COMMERCIAL

## 2017-07-19 VITALS
OXYGEN SATURATION: 98 % | WEIGHT: 172 LBS | BODY MASS INDEX: 24.08 KG/M2 | DIASTOLIC BLOOD PRESSURE: 68 MMHG | HEART RATE: 70 BPM | TEMPERATURE: 98.3 F | HEIGHT: 71 IN | SYSTOLIC BLOOD PRESSURE: 112 MMHG

## 2017-07-19 DIAGNOSIS — M25.562 LEFT KNEE PAIN, UNSPECIFIED CHRONICITY: Primary | ICD-10-CM

## 2017-07-19 PROCEDURE — 99214 OFFICE O/P EST MOD 30 MIN: CPT | Performed by: PHYSICIAN ASSISTANT

## 2017-07-19 ASSESSMENT — PAIN SCALES - GENERAL: PAINLEVEL: MODERATE PAIN (4)

## 2017-07-19 NOTE — PATIENT INSTRUCTIONS
MRI left knee ordered.  Will contact you via phone with results and discuss more definitive treatment.
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
## 2017-07-19 NOTE — MR AVS SNAPSHOT
"              After Visit Summary   2017    Eddi Trejo    MRN: [id number]           Patient Information     Date Of Birth          1990        Visit Information        Provider Department      2017 1:00 PM Raul Gillis PA-C  [facility]        Today's Diagnoses     Left knee pain, unspecified chronicity    -  1      Care Instructions    MRI left knee ordered.  Will contact you via phone with results and discuss more definitive treatment.            Follow-ups after your visit        Who to contact     If you have questions or need follow up information about today's clinic visit or your schedule please contact  [facility] directly at [phone].  Normal or non-critical lab and imaging results will be communicated to you by [employer], letter or phone within 4 business days after the clinic has received the results. If you do not hear from us within 7 days, please contact the clinic through [employer] or phone. If you have a critical or abnormal lab result, we will notify you by phone as soon as possible.  Submit refill requests through [employer] or call your pharmacy and they will forward the refill request to us. Please allow 3 business days for your refill to be completed.          Additional Information About Your Visit        MyChart Information     [employer] lets you send messages to your doctor, view your test results, renew your prescriptions, schedule appointments and more. To sign up, go to [email or web address] . Click on \"Log in\" on the left side of the screen, which will take you to the Welcome page. Then click on \"Sign up Now\" on the right side of the page.     You will be asked to enter the access code listed below, as well as some personal information. Please follow the directions to create your username and password.     Your access code is: [email or web address]  Expires: 2017  5:37 PM     Your access code will  in 90 days. If you need help or a new code, please " "call your [facility] or [phone].        Care EveryWhere ID     This is your Care EveryWhere ID. This could be used by other organizations to access your [facility] medical records  [id number]        Your Vitals Were     Pulse Temperature Height Pulse Oximetry BMI (Body Mass Index)       70 98.3  F (36.8  C) (Tympanic) 5' 11\" (1.803 m) 98% 23.99 kg/m2        Blood Pressure from Last 3 Encounters:   07/19/17 112/68   07/07/17 137/86   05/13/17 143/95    Weight from Last 3 Encounters:   07/19/17 172 lb (78 kg)   03/08/16 194 lb (88 kg)   01/11/16 200 lb (90.7 kg)              We Performed the Following     MR Knee Left w/o Contrast        Primary Care Provider Office Phone # Fax #    Corrie Chirinos [phone]       [facility] [place] [address]  [place] [address]        Equal Access to Services     [facility] : Hadii aad ku hadasho Soomaali, waaxda luqadaha, qaybta kaalmada adeegyada, waxay idiin hayaan andreaseg maxim hyde . So [facility] [phone].    ATENCIÓN: Si habla español, tiene a olivier disposición servicios gratuitos de asistencia lingüística. [facility] [phone].    We comply with applicable federal civil rights laws and [place] laws. We do not discriminate on the basis of race, color, national origin, age, disability sex, sexual orientation or gender identity.            Thank you!     Thank you for choosing  [facility]  for your care. Our goal is always to provide you with excellent care. Hearing back from our patients is one way we can continue to improve our services. Please take a few minutes to complete the written survey that you may receive in the mail after your visit with us. Thank you!             Your Updated Medication List - Protect others around you: Learn how to safely use, store and throw away your medicines at www.disposemymeds.org.          This list is accurate as of: 7/19/17  1:23 PM.  Always use your most recent med list.                   Brand " Name Dispense Instructions for use Diagnosis    naproxen 500 MG tablet    NAPROSYN    20 tablet    Take 1 tablet (500 mg) by mouth 2 times daily (with meals)

## 2017-07-19 NOTE — NURSING NOTE
"Chief Complaint   Patient presents with     Musculoskeletal Problem     Left knee pain. Has had since childhood.       Initial /68 (BP Location: Left arm, Patient Position: Sitting, Cuff Size: Adult Regular)  Pulse 70  Temp 98.3  F (36.8  C) (Tympanic)  Ht 5' 11\" (1.803 m)  Wt 172 lb (78 kg)  SpO2 98%  BMI 23.99 kg/m2 Estimated body mass index is 23.99 kg/(m^2) as calculated from the following:    Height as of this encounter: 5' 11\" (1.803 m).    Weight as of this encounter: 172 lb (78 kg).  Medication Reconciliation: complete   Concetta Villalobos LPN      "

## 2017-07-20 ENCOUNTER — HOSPITAL ENCOUNTER (OUTPATIENT)
Dept: MRI IMAGING | Facility: HOSPITAL | Age: 27
Discharge: HOME | End: 2017-07-20
Attending: PHYSICIAN ASSISTANT | Admitting: PHYSICIAN ASSISTANT
Payer: COMMERCIAL

## 2017-07-20 PROCEDURE — 73721 MRI JNT OF LWR EXTRE W/O DYE: CPT | Mod: TC,LT

## 2017-07-24 ENCOUNTER — TELEPHONE (OUTPATIENT)
Dept: ORTHOPEDICS | Facility: OTHER | Age: 27
End: 2017-07-24

## 2017-07-24 NOTE — TELEPHONE ENCOUNTER
Attempted to contact patient to discuss his left knee MRI results.  There was no answer.  Left message for patient to call back.  Ultimately, he will require an appointment with either Dr. Pritchard or Dr. Moralez to discuss definitive treatment options.

## 2017-07-25 ENCOUNTER — TELEPHONE (OUTPATIENT)
Dept: ORTHOPEDICS | Facility: OTHER | Age: 27
End: 2017-07-25

## 2017-07-25 NOTE — TELEPHONE ENCOUNTER
Spoke with patient regarding his MRI results and need to see a surgeon.  He would like to see Dr. Pritchard.  Patient will be contacted by [facility] to get appointment scheduled.

## 2017-07-31 ENCOUNTER — HOSPITAL ENCOUNTER (OUTPATIENT)
Facility: HOSPITAL | Age: 27
End: 2017-07-31
Attending: ORTHOPAEDIC SURGERY | Admitting: ORTHOPAEDIC SURGERY
Payer: COMMERCIAL

## 2017-07-31 ENCOUNTER — OFFICE VISIT (OUTPATIENT)
Dept: ORTHOPEDICS | Facility: OTHER | Age: 27
End: 2017-07-31
Attending: ORTHOPAEDIC SURGERY
Payer: COMMERCIAL

## 2017-07-31 VITALS
DIASTOLIC BLOOD PRESSURE: 60 MMHG | SYSTOLIC BLOOD PRESSURE: 124 MMHG | BODY MASS INDEX: 24.08 KG/M2 | WEIGHT: 172 LBS | OXYGEN SATURATION: 98 % | HEART RATE: 66 BPM | HEIGHT: 71 IN | TEMPERATURE: 96.4 F

## 2017-07-31 DIAGNOSIS — S83.422D SPRAIN OF LATERAL COLLATERAL LIGAMENT OF LEFT KNEE, SUBSEQUENT ENCOUNTER: Primary | ICD-10-CM

## 2017-07-31 PROCEDURE — 99213 OFFICE O/P EST LOW 20 MIN: CPT | Performed by: ORTHOPAEDIC SURGERY

## 2017-07-31 ASSESSMENT — ANXIETY QUESTIONNAIRES
5. BEING SO RESTLESS THAT IT IS HARD TO SIT STILL: NOT AT ALL
IF YOU CHECKED OFF ANY PROBLEMS ON THIS QUESTIONNAIRE, HOW DIFFICULT HAVE THESE PROBLEMS MADE IT FOR YOU TO DO YOUR WORK, TAKE CARE OF THINGS AT HOME, OR GET ALONG WITH OTHER PEOPLE: NOT DIFFICULT AT ALL
6. BECOMING EASILY ANNOYED OR IRRITABLE: NOT AT ALL
1. FEELING NERVOUS, ANXIOUS, OR ON EDGE: NOT AT ALL
3. WORRYING TOO MUCH ABOUT DIFFERENT THINGS: NOT AT ALL
2. NOT BEING ABLE TO STOP OR CONTROL WORRYING: NOT AT ALL
7. FEELING AFRAID AS IF SOMETHING AWFUL MIGHT HAPPEN: NOT AT ALL
GAD7 TOTAL SCORE: 0

## 2017-07-31 ASSESSMENT — PAIN SCALES - GENERAL: PAINLEVEL: MODERATE PAIN (4)

## 2017-07-31 ASSESSMENT — PATIENT HEALTH QUESTIONNAIRE - PHQ9: 5. POOR APPETITE OR OVEREATING: NOT AT ALL

## 2017-07-31 NOTE — MR AVS SNAPSHOT
"              After Visit Summary   7/31/2017    Eddi Trejo    MRN: [id number]           Patient Information     Date Of Birth          1990        Visit Information        Provider Department      7/31/2017 9:00 AM Jp Pritchard DO  ORTHOPEDICS         Follow-ups after your visit        Your next 10 appointments already scheduled     Aug 01, 2017 10:20 AM CDT   (Arrive by 10:05 AM)   Pre-Op physical with KELLY Starks CNP   [facility] [place] ([facility] [place] )    [address]  [place] [address]   [phone]            Aug 21, 2017  9:20 AM CDT   (Arrive by 9:00 AM)   Post Op with Jp Pritchard DO    ORTHOPEDICS ([facility] )    [address]  [place] [id number] [phone]              Who to contact     If you have questions or need follow up information about today's clinic visit or your schedule please contact  ORTHOPEDICS directly at [phone].  Normal or non-critical lab and imaging results will be communicated to you by [employer], letter or phone within 4 business days after the clinic has received the results. If you do not hear from us within 7 days, please contact the clinic through [employer] or phone. If you have a critical or abnormal lab result, we will notify you by phone as soon as possible.  Submit refill requests through "[employer] or call your pharmacy and they will forward the refill request to us. Please allow 3 business days for your refill to be completed.          Additional Information About Your Visit        MyChart Information     "[employer] lets you send messages to your doctor, view your test results, renew your prescriptions, schedule appointments and more. To sign up, go to [email or web address] . Click on \"Log in\" on the left side of the screen, which will take you to the Welcome page. Then click on \"Sign up Now\" on the right side of the page.     You will be asked to enter the access code " "listed below, as well as some personal information. Please follow the directions to create your username and password.     Your access code is: [email or web address]  Expires: 10/29/2017  9:31 AM     Your access code will  in 90 days. If you need help or a new code, please call your [facility] or [phone].        Care EveryWhere ID     This is your Care EveryWhere ID. This could be used by other organizations to access your [place] medical records  [id number]        Your Vitals Were     Pulse Temperature Height Pulse Oximetry BMI (Body Mass Index)       66 96.4  F (35.8  C) (Tympanic) 5' 11\" (1.803 m) 98% 23.99 kg/m2        Blood Pressure from Last 3 Encounters:   17 124/60   17 112/68   17 137/86    Weight from Last 3 Encounters:   17 172 lb (78 kg)   17 172 lb (78 kg)   16 194 lb (88 kg)              Today, you had the following     No orders found for display       Primary Care Provider Office Phone # Fax #    Corrie Chirinos [phone]       [facility] [place] [address]  [place] [address]        Equal Access to Services     ASTON HARDEN : Hadii joselito ku hadasho Soomaali, waaxda luqadaha, qaybta kaalmada adeegyada, ciera idiin haypatsy hyde . So [facility] [phone].    ATENCIÓN: Si habla español, tiene a olivier disposición servicios gratuitos de asistencia lingüística. Llame al [phone].    We comply with applicable federal civil rights laws and [place] laws. We do not discriminate on the basis of race, color, national origin, age, disability sex, sexual orientation or gender identity.            Thank you!     Thank you for choosing  ORTHOPEDICS  for your care. Our goal is always to provide you with excellent care. Hearing back from our patients is one way we can continue to improve our services. Please take a few minutes to complete the written survey that you may receive in the mail after your visit with us. Thank you!      "        Your Updated Medication List - Protect others around you: Learn how to safely use, store and throw away your medicines at www.disposemymeds.org.          This list is accurate as of: 7/31/17  9:31 AM.  Always use your most recent med list.                   Brand Name Dispense Instructions for use Diagnosis    naproxen 500 MG tablet    NAPROSYN    20 tablet    Take 1 tablet (500 mg) by mouth 2 times daily (with meals)

## 2017-07-31 NOTE — PROGRESS NOTES
Patient presents today for evaluation of his left knee.  He had a twisting injury proximally 2 weeks ago, was evaluated by Raul Gillis RPA here in the orthopedic clinic.  An MRI was ordered and this demonstrates a torn lateral meniscus and a significant effusion.    Physical exam: Patient has a moderate effusion, tenderness over the lateral joint line, and a positive Caren sign.  The knee is stable to varus valgus and drawer and has range of motion of 5  to approximately 95 .  X-rays and MRI are reviewed, the x-rays are normal.  The MRI demonstrates complex tear of the lateral meniscus.  The ACL PCL and other internal structures of the knee appear normal.    Assessment and plan: Recommend he consider arthroscopic examination of the knee and treatment of torn meniscus.  Technical aspects of surgery, usually expect her recovery, and potential complications were all discussed and questions answered to his satisfaction.  A surgical consent form was filled out in the office today.  Surgery scheduled for 8 August 2017.

## 2017-07-31 NOTE — NURSING NOTE
"Chief Complaint   Patient presents with     Musculoskeletal Problem     Injured at work 06/15/17. 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       Initial /60 (BP Location: Left arm, Patient Position: Sitting, Cuff Size: Adult Regular)  Pulse 66  Temp 96.4  F (35.8  C) (Tympanic)  Ht 5' 11\" (1.803 m)  Wt 172 lb (78 kg)  SpO2 98%  BMI 23.99 kg/m2 Estimated body mass index is 23.99 kg/(m^2) as calculated from the following:    Height as of this encounter: 5' 11\" (1.803 m).    Weight as of this encounter: 172 lb (78 kg).  Medication Reconciliation: complete   Concetta Villalobos LPN      "

## 2017-08-01 ASSESSMENT — PATIENT HEALTH QUESTIONNAIRE - PHQ9: SUM OF ALL RESPONSES TO PHQ QUESTIONS 1-9: 0

## 2017-08-01 ASSESSMENT — ANXIETY QUESTIONNAIRES: GAD7 TOTAL SCORE: 0

## 2017-08-02 ENCOUNTER — TELEPHONE (OUTPATIENT)
Dept: ORTHOPEDICS | Facility: OTHER | Age: 27
End: 2017-08-02

## 2017-08-02 NOTE — TELEPHONE ENCOUNTER
Patient failed to show for his pre-op with Jackie Douglas. Surgery schedule for 08/08/17. Phoned patient and asked him to contact office and get rescheduled or we will have to cancel surgery,  Concetta Villalobos LPN

## 2017-08-02 NOTE — TELEPHONE ENCOUNTER
----- Message from Spring Andrews RN sent at 8/1/2017  2:33 PM CDT -----  Regarding: Patient does not have a pre-op  Hi Concetta,  I see Eddi did not show for his scheduled pre-op today with JIL Douglas CNP for surgery 08/11 with Dr. Pritchard.  I don't see another pre-op scheduled, yet.  Please let me know if anything changes with patient surgery schedule and I'll hang on to consent.  Thank you

## 2017-08-04 ENCOUNTER — TELEPHONE (OUTPATIENT)
Dept: ORTHOPEDICS | Facility: OTHER | Age: 27
End: 2017-08-04

## 2017-08-04 NOTE — TELEPHONE ENCOUNTER
Patient failed pre op 08-01-17. Made numerous attempts to contact patient but was unsuccessful. All numbers disconnected. Cancelled surgery. Surgery notified. Cancelled in surgery book.  Concetta Villalobos LPN

## 2017-08-10 NOTE — TELEPHONE ENCOUNTER
Patient called left message that he needs to reschedule his surgery.    Please call him at [phone]    Thank you

## 2017-08-14 ENCOUNTER — TELEPHONE (OUTPATIENT)
Dept: ORTHOPEDICS | Facility: OTHER | Age: 27
End: 2017-08-14

## 2017-08-14 NOTE — TELEPHONE ENCOUNTER
Patient was contacted on 08/10/17 and instructed to reschedule a date for another consult. He was also instructed to make another pre op appointment as his pre op wouldn't be valid. I believe that this is what he was attempting to do by calling our office to set up another appointment. So, please schedule him appointment as soon as able.  Concetta Villalobos LPN

## 2017-11-06 ENCOUNTER — TELEPHONE (OUTPATIENT)
Dept: ORTHOPEDICS | Facility: OTHER | Age: 27
End: 2017-11-06

## 2017-11-06 NOTE — TELEPHONE ENCOUNTER
Patient returned call and rescheduled surgery for November 10, 2017 patient was transferred to [facility] to schedule preop and Nurse only as patient will have to see nurse prior to surgery.  Arianna Douglas LPN

## 2017-11-06 NOTE — TELEPHONE ENCOUNTER
Patient stating he would like to schedule surgery again. He is now laid off from work would like to get this done ASAP.     Questioning if he needs to come in to see Dr. Pritchard again.    Please call patient at [phone]

## 2017-11-07 DIAGNOSIS — S83.209A TORN MENISCUS: Primary | ICD-10-CM

## 2017-11-09 ENCOUNTER — OFFICE VISIT (OUTPATIENT)
Dept: FAMILY MEDICINE | Facility: OTHER | Age: 27
End: 2017-11-09
Attending: FAMILY MEDICINE
Payer: COMMERCIAL

## 2017-11-09 ENCOUNTER — ALLIED HEALTH/NURSE VISIT (OUTPATIENT)
Dept: ORTHOPEDICS | Facility: OTHER | Age: 27
End: 2017-11-09
Attending: ORTHOPAEDIC SURGERY
Payer: COMMERCIAL

## 2017-11-09 VITALS
TEMPERATURE: 98 F | WEIGHT: 199 LBS | DIASTOLIC BLOOD PRESSURE: 68 MMHG | SYSTOLIC BLOOD PRESSURE: 118 MMHG | HEIGHT: 71 IN | BODY MASS INDEX: 27.86 KG/M2 | HEART RATE: 71 BPM | OXYGEN SATURATION: 98 %

## 2017-11-09 DIAGNOSIS — Z01.818 PREOP GENERAL PHYSICAL EXAM: Primary | ICD-10-CM

## 2017-11-09 DIAGNOSIS — Z41.9 SURGERY, ELECTIVE: Primary | ICD-10-CM

## 2017-11-09 LAB
BASOPHILS # BLD AUTO: 0 10E9/L (ref 0–0.2)
BASOPHILS NFR BLD AUTO: 0.5 %
DIFFERENTIAL METHOD BLD: NORMAL
EOSINOPHIL # BLD AUTO: 0.1 10E9/L (ref 0–0.7)
EOSINOPHIL NFR BLD AUTO: 0.9 %
ERYTHROCYTE [DISTWIDTH] IN BLOOD BY AUTOMATED COUNT: 12.2 % (ref 10–15)
HCT VFR BLD AUTO: 40.1 % (ref 40–53)
HGB BLD-MCNC: 13.9 G/DL (ref 13.3–17.7)
IMM GRANULOCYTES # BLD: 0 10E9/L (ref 0–0.4)
IMM GRANULOCYTES NFR BLD: 0.3 %
LYMPHOCYTES # BLD AUTO: 2 10E9/L (ref 0.8–5.3)
LYMPHOCYTES NFR BLD AUTO: 26.7 %
MCH RBC QN AUTO: 31.1 PG (ref 26.5–33)
MCHC RBC AUTO-ENTMCNC: 34.7 G/DL (ref 31.5–36.5)
MCV RBC AUTO: 90 FL (ref 78–100)
MONOCYTES # BLD AUTO: 0.8 10E9/L (ref 0–1.3)
MONOCYTES NFR BLD AUTO: 10.8 %
NEUTROPHILS # BLD AUTO: 4.5 10E9/L (ref 1.6–8.3)
NEUTROPHILS NFR BLD AUTO: 60.8 %
NRBC # BLD AUTO: 0 10*3/UL
NRBC BLD AUTO-RTO: 0 /100
PLATELET # BLD AUTO: 243 10E9/L (ref 150–450)
RBC # BLD AUTO: 4.47 10E12/L (ref 4.4–5.9)
WBC # BLD AUTO: 7.4 10E9/L (ref 4–11)

## 2017-11-09 PROCEDURE — 36415 COLL VENOUS BLD VENIPUNCTURE: CPT | Performed by: FAMILY MEDICINE

## 2017-11-09 PROCEDURE — 85025 COMPLETE CBC W/AUTO DIFF WBC: CPT | Performed by: FAMILY MEDICINE

## 2017-11-09 PROCEDURE — 99214 OFFICE O/P EST MOD 30 MIN: CPT | Performed by: FAMILY MEDICINE

## 2017-11-09 ASSESSMENT — PAIN SCALES - GENERAL: PAINLEVEL: MODERATE PAIN (5)

## 2017-11-09 NOTE — NURSING NOTE
Patient received Hibiclens for showering before surgery and stated to writer that he had crutches and instructed to bring day of surgery , writer also given a worksheet to fill out and patient advise will fill out and call on Monday when Dr. Pritchard is back in office. Patient agreed to plan.  Arianna Douglas LPN

## 2017-11-09 NOTE — MR AVS SNAPSHOT
After Visit Summary   11/9/2017    Eddi Trejo    MRN: [id number]           Patient Information     Date Of Birth          1990        Visit Information        Provider Department      11/9/2017 3:45 PM PHU Chirinos MD [facility]        Today's Diagnoses     Preop general physical exam    -  1      Care Instructions      Before Your Surgery      Call your surgeon if there is any change in your health. This includes signs of a cold or flu (such as a sore throat, runny nose, cough, rash or fever).    Do not smoke, drink alcohol or take over the counter medicine (unless your surgeon or primary care doctor tells you to) for the 24 hours before and after surgery.    If you take prescribed drugs: Follow your doctor s orders about which medicines to take and which to stop until after surgery.    Eating and drinking prior to surgery: follow the instructions from your surgeon    Take a shower or bath the night before surgery. Use the soap your surgeon gave you to gently clean your skin. If you do not have soap from your surgeon, use your regular soap. Do not shave or scrub the surgery site.  Wear clean pajamas and have clean sheets on your bed.           Follow-ups after your visit        Your next 10 appointments already scheduled     Nov 09, 2017  4:15 PM CST   (Arrive by 4:00 PM)   Nurse Only with HC ORTHO NURSE   [facility] ORTHOPEDICS ([facility] )    [address] [place] [id number] [phone]            Nov 10, 2017   Procedure with Jp Pritchard DO   [facility] Periop [facility] ([facility] )    [address] [place] [id number] [phone]            Nov 21, 2017  1:20 PM CST   (Arrive by 1:00 PM)   Post Op with Jp Pritchard DO    ORTHOPEDICS ([facility] )    [address] [id number] [phone]              Who to contact     If you have questions or need follow up information about  "today's clinic visit or your schedule please contact [facility] directly at [phone].  Normal or non-critical lab and imaging results will be communicated to you by MyChart, letter or phone within 4 business days after the clinic has received the results. If you do not hear from us within 7 days, please contact the clinic through MyChart or phone. If you have a critical or abnormal lab result, we will notify you by phone as soon as possible.  Submit refill requests through [employer] or call your pharmacy and they will forward the refill request to us. Please allow 3 business days for your refill to be completed.          Additional Information About Your Visit        MyChart Information     [employer] lets you send messages to your doctor, view your test results, renew your prescriptions, schedule appointments and more. To sign up, go to [email or web address] . Click on \"Log in\" on the left side of the screen, which will take you to the Welcome page. Then click on \"Sign up Now\" on the right side of the page.     You will be asked to enter the access code listed below, as well as some personal information. Please follow the directions to create your username and password.     Your access code is: [email or web address]  Expires: 2018  3:56 PM     Your access code will  in 90 days. If you need help or a new code, please call your [place] clinic or [phone].        Care EveryWhere ID     This is your Care EveryWhere ID. This could be used by other organizations to access your [place] medical records  [id number]        Your Vitals Were     Pulse Temperature Height Pulse Oximetry BMI (Body Mass Index)       71 98  F (36.7  C) (Tympanic) 5' 11\" (1.803 m) 98% 27.75 kg/m2        Blood Pressure from Last 3 Encounters:   17 118/68   17 124/60   17 112/68    Weight from Last 3 Encounters:   17 199 lb (90.3 kg)   17 172 lb (78 kg)   17 172 lb (78 kg)              We " Performed the Following     CBC with platelets differential        Primary Care Provider Office Phone # Fax #    Corrie Chirinos [phone]       [facility] [place] [address]  [facility] [address]        Equal Access to Services     ASTON HARDEN : Hadii joselito liu hadkamryno Soomaali, waaxda luqadaha, qaybta kaalmada adeegyada, ciera herrera javierbrittany hollingsworthaustinadriana leyva. So [facility] [phone].    ATENCIÓN: Si habla español, tiene a olivier disposición servicios gratuitos de asistencia lingüística. Llame al [phone].    We comply with applicable federal civil rights laws and [place] laws. We do not discriminate on the basis of race, color, national origin, age, disability, sex, sexual orientation, or gender identity.            Thank you!     [facility] you [facility]  [facility]. Our goal is always to provide you with excellent care. Hearing back from our patients is one way we can continue to improve our services. Please take a few minutes to complete the written survey that you may receive in the mail after your visit with us. Thank you!             Your Updated Medication List - Protect others around you: Learn how to safely use, store and throw away your medicines at www.disposemymeds.org.      Notice  As of 11/9/2017  3:56 PM    You have not been prescribed any medications.

## 2017-11-09 NOTE — NURSING NOTE
"Chief Complaint   Patient presents with     Pre-Op Exam       Initial /68 (BP Location: Left arm, Patient Position: Chair, Cuff Size: Adult Regular)  Pulse 71  Temp 98  F (36.7  C) (Tympanic)  Ht 5' 11\" (1.803 m)  Wt 199 lb (90.3 kg)  SpO2 98%  BMI 27.75 kg/m2 Estimated body mass index is 27.75 kg/(m^2) as calculated from the following:    Height as of this encounter: 5' 11\" (1.803 m).    Weight as of this encounter: 199 lb (90.3 kg).  Medication Reconciliation: complete     Kat Mtz     "

## 2017-11-09 NOTE — MR AVS SNAPSHOT
"              After Visit Summary   11/9/2017    Eddi Trejo    MRN: [id number]           Patient Information     Date Of Birth          1990        Visit Information        Provider Department      11/9/2017 4:15 PM HC ORTHO NURSE [facility] ORTHOPEDICS        Today's Diagnoses     Surgery, elective    -  1       Follow-ups after your visit        Your next 10 appointments already scheduled     Nov 10, 2017   Procedure with Jp Pritchard DO   [facility] ([facility] )    [address] [place] [id number] [phone]            Nov 21, 2017  1:20 PM CST   (Arrive by 1:00 PM)   Post Op with Jp Pritchard DO    ORTHOPEDICS ([facility] )    [address] [facility] [id number] [phone]              Who to contact     If you have questions or need follow up information about today's clinic visit or your schedule please contact  ORTHOPEDICS directly at [phone].  Normal or non-critical lab and imaging results will be communicated to you by [employer], letter or phone within 4 business days after the clinic has received the results. If you do not hear from us within 7 days, please contact the clinic through [employer] or phone. If you have a critical or abnormal lab result, we will notify you by phone as soon as possible.  Submit refill requests through [employer] or call your pharmacy and they will forward the refill request to us. Please allow 3 business days for your refill to be completed.          Additional Information About Your Visit        [employer] Information     [employer] lets you send messages to your doctor, view your test results, renew your prescriptions, schedule appointments and more. To sign up, go to [email or web address] . Click on \"Log in\" on the left side of the screen, which will take you to the Welcome page. Then click on \"Sign up Now\" on the right side of the page.     You will be asked to enter the access code listed below, as " well as some personal information. Please follow the directions to create your username and password.     Your access code is: [email or web address]  Expires: 2018  3:56 PM     Your access code will  in 90 days. If you need help or a new code, please call your [facility] clinic or [phone].        Care EveryWhere ID     This is your Care EveryWhere ID. This could be used by other organizations to access your [facility] medical records  [id number]         Blood Pressure from Last 3 Encounters:   17 118/68   17 124/60   17 112/68    Weight from Last 3 Encounters:   17 199 lb (90.3 kg)   17 172 lb (78 kg)   17 172 lb (78 kg)              Today, you had the following     No orders found for display       Primary Care Provider Office Phone # Fax #    Corrie Chirinos [phone]       [facility] [place] [address]  [place] [address]        Equal Access to Services     [facility]: Hadii aad ku hadasho Soomaali, waaxda luqadaha, qaybta kaalmada adeegyada, ciera hyde . So [facility] [phone].    ATENCIÓN: Si habla español, tiene a olivier disposición servicios gratuitos de asistencia lingüística. Llame al [phone].    We comply with applicable federal civil rights laws and [place] laws. We do not discriminate on the basis of race, color, national origin, age, disability, sex, sexual orientation, or gender identity.            Thank you!     Thank you for choosing  ORTHOPEDICS  for your care. Our goal is always to provide you with excellent care. Hearing back from our patients is one way we can continue to improve our services. Please take a few minutes to complete the written survey that you may receive in the mail after your visit with us. Thank you!             Your Updated Medication List - Protect others around you: Learn how to safely use, store and throw away your medicines at www.disposemymeds.org.      Notice  As of  11/9/2017  4:25 PM    You have not been prescribed any medications.

## 2017-11-09 NOTE — PROGRESS NOTES
[facility] [place]  [address]  [place] [address]  [phone]  Dept: [phone]    PRE-OP EVALUATION:  Today's date: 2017    Eddi Trejo (: 1990) presents for pre-operative evaluation assessment as requested by Dr. Pritchard.  He requires evaluation and anesthesia risk assessment prior to undergoing surgery/procedure for treatment of left knee .  Proposed procedure: surgery on left knee    Date of Surgery/ Procedure: 11/10/17  Time of Surgery/ Procedure: 8:30am  Hospital/Surgical Facility: [facility] [place]  Primary Physician: Corrie Chirinos  Type of Anesthesia Anticipated: to be determined    Patient has a Health Care Directive or Living Will:  NO    1. NO - Do you have a history of heart attack, stroke, stent, bypass or surgery on an artery in the head, neck, heart or legs?  2. NO - Do you ever have any pain or discomfort in your chest?  3. NO - Do you have a history of  Heart Failure?  4. NO - Are you troubled by shortness of breath when: walking on the level, up a slight hill or at night?  5. NO - Do you currently have a cold, bronchitis or other respiratory infection?  6. NO - Do you have a cough, shortness of breath or wheezing?  7. NO - Do you sometimes get pains in the calves of your legs when you walk?  8. NO - Do you or anyone in your family have previous history of blood clots?  9. NO - Do you or does anyone in your family have a serious bleeding problem such as prolonged bleeding following surgeries or cuts?  10. NO - Have you ever had problems with anemia or been told to take iron pills?  11. NO - Have you had any abnormal blood loss such as black, tarry or bloody stools, or abnormal vaginal bleeding?  12. NO - Have you ever had a blood transfusion?  13. NO - Have you or any of your relatives ever had problems with anesthesia?  14. NO - Do you have sleep apnea, excessive snoring or daytime drowsiness?  15. NO - Do you have any prosthetic heart valves?  16. NO - Do you have  prosthetic joints?  17. NO - Is there any chance that you may be pregnant?        HPI:                                                      Brief HPI related to upcoming procedure: Left knee meniscal injury will have arthroscopy      See problem list for active medical problems.  Problems all longstanding and stable, except as noted/documented.  See ROS for pertinent symptoms related to these conditions.                                                                                                  .    MEDICAL HISTORY:                                                    There are no active problems to display for this patient.     Past Medical History:   Diagnosis Date     Bronchitis 4/25/02     Headache 4/28/10     Open wound of forehead 4/28/10     Otitis 4/18/02     Schizophrenia (H) 4/28/10     URI (upper respiratory infection) 12/24/95     Past Surgical History:   Procedure Laterality Date     HC CREATE EARDRUM OPENING,GEN ANESTH      PE tubes as child     No current outpatient prescriptions on file.     OTC products: none    Allergies   Allergen Reactions     Penicillins       Latex Allergy: NO    Social History   Substance Use Topics     Smoking status: Never Smoker     Smokeless tobacco: Current User     Types: Chew     Alcohol use No     History   Drug Use No       REVIEW OF SYSTEMS:                                                    C: NEGATIVE for fever, chills, change in weight  I: NEGATIVE for worrisome rashes, moles or lesions  E: NEGATIVE for vision changes or irritation  E/M: NEGATIVE for ear, mouth and throat problems  R: NEGATIVE for significant cough or SOB  B: NEGATIVE for masses, tenderness or discharge  CV: NEGATIVE for chest pain, palpitations or peripheral edema  GI: NEGATIVE for nausea, abdominal pain, heartburn, or change in bowel habits  : NEGATIVE for frequency, dysuria, or hematuria  M: NEGATIVE for significant arthralgias or myalgia  N: NEGATIVE for weakness, dizziness or  "paresthesias  E: NEGATIVE for temperature intolerance, skin/hair changes  H: NEGATIVE for bleeding problems  P: NEGATIVE for changes in mood or affect    EXAM:                                                    /68 (BP Location: Left arm, Patient Position: Chair, Cuff Size: Adult Regular)  Pulse 71  Temp 98  F (36.7  C) (Tympanic)  Ht 5' 11\" (1.803 m)  Wt 199 lb (90.3 kg)  SpO2 98%  BMI 27.75 kg/m2    GENERAL APPEARANCE: healthy, alert and no distress     EYES: EOMI,  PERRL     HENT: ear canals and TM's normal and nose and mouth without ulcers or lesions     NECK: no adenopathy, no asymmetry, masses, or scars and thyroid normal to palpation     RESP: lungs clear to auscultation - no rales, rhonchi or wheezes     CV: regular rates and rhythm, normal S1 S2, no S3 or S4 and no murmur, click or rub     ABDOMEN:  soft, nontender, no HSM or masses and bowel sounds normal     MS:tenderness left knee     SKIN: no suspicious lesions or rashes     NEURO: Normal strength and tone, sensory exam grossly normal, mentation intact and speech normal     PSYCH: mentation appears normal. and affect normal/bright     LYMPHATICS: No axillary, cervical, or supraclavicular nodes    DIAGNOSTICS:                                                      Labs Resulted Today:   Results for orders placed or performed in visit on 11/09/17   CBC with platelets differential   Result Value Ref Range    WBC 7.4 4.0 - 11.0 10e9/L    RBC Count 4.47 4.4 - 5.9 10e12/L    Hemoglobin 13.9 13.3 - 17.7 g/dL    Hematocrit 40.1 40.0 - 53.0 %    MCV 90 78 - 100 fl    MCH 31.1 26.5 - 33.0 pg    MCHC 34.7 31.5 - 36.5 g/dL    RDW 12.2 10.0 - 15.0 %    Platelet Count 243 150 - 450 10e9/L    Diff Method Automated Method     % Neutrophils 60.8 %    % Lymphocytes 26.7 %    % Monocytes 10.8 %    % Eosinophils 0.9 %    % Basophils 0.5 %    % Immature Granulocytes 0.3 %    Nucleated RBCs 0 0 /100    Absolute Neutrophil 4.5 1.6 - 8.3 10e9/L    Absolute " Lymphocytes 2.0 0.8 - 5.3 10e9/L    Absolute Monocytes 0.8 0.0 - 1.3 10e9/L    Absolute Eosinophils 0.1 0.0 - 0.7 10e9/L    Absolute Basophils 0.0 0.0 - 0.2 10e9/L    Abs Immature Granulocytes 0.0 0 - 0.4 10e9/L    Absolute Nucleated RBC 0.0      7/20/17  1:18 PM 7/20/17  1:43 PM [id number] [facility] MRI    [facility] Interactive Report and InfoRx   View the interactive report   PACS Images   Show images for MR Knee Left w/o Contrast   Study Result         LEFT KNEE MRI     HISTORY:  Pain and instability of the left knee.  The patient has a  slipping sensation.     TECHNIQUE:  Axial proton density, axial proton density fat sat,  sagittal proton density fat sat, sagittal T2, coronal proton density  fat sat, coronal T1 imaging of the left knee was performed.     FINDINGS:  There is a moderate-sized joint effusion.  There is an  extensive displaced tear of the lateral meniscus.  The tear has a  bucket handle or flap-type configuration.  The tear of the posterior  horn mid portion involves the meniscocapsular junction.  There is a  large centrally and anteriorly displaced fragment.     The medial meniscus is intact.     The anterior and posterior cruciate ligaments are intact.  The  extensor mechanism is intact.  The medial collateral ligament and  lateral collateral complex are intact.     Cartilage thickness and signal within the knee is well maintained.     IMPRESSION:  1.  EXTENSIVE DISPLACED BUCKET HANDLE OR FLAP-TYPE TEAR OF THE LATERAL  MENISCUS WITH ANTERIOR AND CENTRAL DISPLACEMENT OF MENISCAL  COMPONENTS.  TEARING OCCURS TO THE PERIPHERAL MENISCOCAPSULAR  JUNCTION.     2.  THERE IS A MODERATE-SIZED JOINT EFFUSION.     3.  THE MEDIAL MENISCUS IS INTACT.  THERE IS NO EVIDENCE OF  LIGAMENTOUS INJURY OR OSSEOUS CONTUSION.                 SIGNATURE PAGE ONLY  Exam Date: Jul 20, 2017 01:43:00 PM  Author: ABDIEL NICHOLS  This report is final and signed            IMPRESSION:                                                     Reason for surgery/procedure: meniscal injury left knee so will have arthroscopy  Diagnosis/reason for consult:cardiopulmonary clearance    The proposed surgical procedure is considered INTERMEDIATE risk.    REVISED CARDIAC RISK INDEX  The patient has the following serious cardiovascular risks for perioperative complications such as (MI, PE, VFib and 3  AV Block):  No serious cardiac risks  INTERPRETATION: 0 risks: Class I (very low risk - 0.4% complication rate)    The patient has the following additional risks for perioperative complications:  No identified additional risks        RECOMMENDATIONS:                                                              APPROVAL GIVEN to proceed with proposed procedure, without further diagnostic evaluation. He can do 4 METS without difficulty. He'll be n.p.o. Tonight.  Is a nonsmoker.       Signed Electronically by: PHU Chirinos MD    Copy of this evaluation report is provided to requesting physician.    [facility] Preop Guidelines

## 2017-11-10 ENCOUNTER — ANESTHESIA (OUTPATIENT)
Dept: SURGERY | Facility: HOSPITAL | Age: 27
End: 2017-11-10
Payer: COMMERCIAL

## 2017-11-10 ENCOUNTER — HOSPITAL ENCOUNTER (OUTPATIENT)
Facility: HOSPITAL | Age: 27
Discharge: HOME | End: 2017-11-10
Attending: ORTHOPAEDIC SURGERY | Admitting: ORTHOPAEDIC SURGERY
Payer: COMMERCIAL

## 2017-11-10 ENCOUNTER — SURGERY (OUTPATIENT)
Age: 27
End: 2017-11-10

## 2017-11-10 ENCOUNTER — ANESTHESIA EVENT (OUTPATIENT)
Dept: SURGERY | Facility: HOSPITAL | Age: 27
End: 2017-11-10
Payer: COMMERCIAL

## 2017-11-10 VITALS
DIASTOLIC BLOOD PRESSURE: 81 MMHG | SYSTOLIC BLOOD PRESSURE: 112 MMHG | HEIGHT: 71 IN | BODY MASS INDEX: 27.16 KG/M2 | TEMPERATURE: 99 F | WEIGHT: 194 LBS | OXYGEN SATURATION: 98 % | RESPIRATION RATE: 20 BRPM

## 2017-11-10 DIAGNOSIS — Z98.890 H/O KNEE SURGERY: Primary | ICD-10-CM

## 2017-11-10 PROCEDURE — 71000015 ZZH RECOVERY PHASE 1 LEVEL 2 EA ADDTL HR: Performed by: ORTHOPAEDIC SURGERY

## 2017-11-10 PROCEDURE — S0077 INJECTION, CLINDAMYCIN PHOSP: HCPCS | Performed by: ORTHOPAEDIC SURGERY

## 2017-11-10 PROCEDURE — 40000305 ZZH STATISTIC PRE PROC ASSESS I: Performed by: ORTHOPAEDIC SURGERY

## 2017-11-10 PROCEDURE — 36000056 ZZH SURGERY LEVEL 3 1ST 30 MIN: Performed by: ORTHOPAEDIC SURGERY

## 2017-11-10 PROCEDURE — 25000125 ZZHC RX 250: Performed by: ORTHOPAEDIC SURGERY

## 2017-11-10 PROCEDURE — 25000125 ZZHC RX 250: Performed by: NURSE ANESTHETIST, CERTIFIED REGISTERED

## 2017-11-10 PROCEDURE — 25000128 H RX IP 250 OP 636: Performed by: ANESTHESIOLOGY

## 2017-11-10 PROCEDURE — 01999 UNLISTED ANES PROCEDURE: CPT | Performed by: NURSE ANESTHETIST, CERTIFIED REGISTERED

## 2017-11-10 PROCEDURE — 37000009 ZZH ANESTHESIA TECHNICAL FEE, EACH ADDTL 15 MIN: Performed by: ORTHOPAEDIC SURGERY

## 2017-11-10 PROCEDURE — 25000128 H RX IP 250 OP 636: Performed by: NURSE ANESTHETIST, CERTIFIED REGISTERED

## 2017-11-10 PROCEDURE — 27210794 ZZH OR GENERAL SUPPLY STERILE: Performed by: ORTHOPAEDIC SURGERY

## 2017-11-10 PROCEDURE — 36000058 ZZH SURGERY LEVEL 3 EA 15 ADDTL MIN: Performed by: ORTHOPAEDIC SURGERY

## 2017-11-10 PROCEDURE — 25000132 ZZH RX MED GY IP 250 OP 250 PS 637: Performed by: ORTHOPAEDIC SURGERY

## 2017-11-10 PROCEDURE — 25000125 ZZHC RX 250: Performed by: ANESTHESIOLOGY

## 2017-11-10 PROCEDURE — 29881 ARTHRS KNE SRG MNISECTMY M/L: CPT | Performed by: ANESTHESIOLOGY

## 2017-11-10 PROCEDURE — 37000008 ZZH ANESTHESIA TECHNICAL FEE, 1ST 30 MIN: Performed by: ORTHOPAEDIC SURGERY

## 2017-11-10 PROCEDURE — 29882 ARTHRS KNE SRG MNISC RPR M/L: CPT | Mod: LT | Performed by: ORTHOPAEDIC SURGERY

## 2017-11-10 PROCEDURE — 71000014 ZZH RECOVERY PHASE 1 LEVEL 2 FIRST HR: Performed by: ORTHOPAEDIC SURGERY

## 2017-11-10 PROCEDURE — 71000027 ZZH RECOVERY PHASE 2 EACH 15 MINS: Performed by: ORTHOPAEDIC SURGERY

## 2017-11-10 PROCEDURE — C1713 ANCHOR/SCREW BN/BN,TIS/BN: HCPCS | Performed by: ORTHOPAEDIC SURGERY

## 2017-11-10 PROCEDURE — 27110028 ZZH OR GENERAL SUPPLY NON-STERILE: Performed by: ORTHOPAEDIC SURGERY

## 2017-11-10 DEVICE — IMPLANTABLE DEVICE: Type: IMPLANTABLE DEVICE | Site: KNEE | Status: FUNCTIONAL

## 2017-11-10 RX ORDER — CLINDAMYCIN PHOSPHATE 900 MG/50ML
900 INJECTION, SOLUTION INTRAVENOUS
Status: COMPLETED | OUTPATIENT
Start: 2017-11-10 | End: 2017-11-10

## 2017-11-10 RX ORDER — LIDOCAINE HYDROCHLORIDE 20 MG/ML
INJECTION, SOLUTION INFILTRATION; PERINEURAL PRN
Status: DISCONTINUED | OUTPATIENT
Start: 2017-11-10 | End: 2017-11-10

## 2017-11-10 RX ORDER — ONDANSETRON 4 MG/1
4 TABLET, ORALLY DISINTEGRATING ORAL EVERY 30 MIN PRN
Status: DISCONTINUED | OUTPATIENT
Start: 2017-11-10 | End: 2017-11-10 | Stop reason: HOSPADM

## 2017-11-10 RX ORDER — ONDANSETRON 2 MG/ML
4 INJECTION INTRAMUSCULAR; INTRAVENOUS EVERY 30 MIN PRN
Status: DISCONTINUED | OUTPATIENT
Start: 2017-11-10 | End: 2017-11-10 | Stop reason: HOSPADM

## 2017-11-10 RX ORDER — ONDANSETRON 4 MG/1
4 TABLET, ORALLY DISINTEGRATING ORAL
Status: DISCONTINUED | OUTPATIENT
Start: 2017-11-10 | End: 2017-11-10 | Stop reason: HOSPADM

## 2017-11-10 RX ORDER — KETOROLAC TROMETHAMINE 30 MG/ML
INJECTION, SOLUTION INTRAMUSCULAR; INTRAVENOUS PRN
Status: DISCONTINUED | OUTPATIENT
Start: 2017-11-10 | End: 2017-11-10

## 2017-11-10 RX ORDER — SCOLOPAMINE TRANSDERMAL SYSTEM 1 MG/1
1 PATCH, EXTENDED RELEASE TRANSDERMAL ONCE
Status: COMPLETED | OUTPATIENT
Start: 2017-11-10 | End: 2017-11-10

## 2017-11-10 RX ORDER — FENTANYL CITRATE 50 UG/ML
25-50 INJECTION, SOLUTION INTRAMUSCULAR; INTRAVENOUS
Status: DISCONTINUED | OUTPATIENT
Start: 2017-11-10 | End: 2017-11-10 | Stop reason: HOSPADM

## 2017-11-10 RX ORDER — FENTANYL CITRATE 50 UG/ML
25-50 INJECTION, SOLUTION INTRAMUSCULAR; INTRAVENOUS
Status: CANCELLED | OUTPATIENT
Start: 2017-11-10

## 2017-11-10 RX ORDER — HYDROMORPHONE HYDROCHLORIDE 1 MG/ML
.3-.5 INJECTION, SOLUTION INTRAMUSCULAR; INTRAVENOUS; SUBCUTANEOUS EVERY 10 MIN PRN
Status: DISCONTINUED | OUTPATIENT
Start: 2017-11-10 | End: 2017-11-10 | Stop reason: HOSPADM

## 2017-11-10 RX ORDER — ONDANSETRON 2 MG/ML
INJECTION INTRAMUSCULAR; INTRAVENOUS PRN
Status: DISCONTINUED | OUTPATIENT
Start: 2017-11-10 | End: 2017-11-10

## 2017-11-10 RX ORDER — ALBUTEROL SULFATE 0.83 MG/ML
2.5 SOLUTION RESPIRATORY (INHALATION) EVERY 4 HOURS PRN
Status: DISCONTINUED | OUTPATIENT
Start: 2017-11-10 | End: 2017-11-10 | Stop reason: HOSPADM

## 2017-11-10 RX ORDER — MEPERIDINE HYDROCHLORIDE 25 MG/ML
12.5 INJECTION INTRAMUSCULAR; INTRAVENOUS; SUBCUTANEOUS
Status: DISCONTINUED | OUTPATIENT
Start: 2017-11-10 | End: 2017-11-10 | Stop reason: HOSPADM

## 2017-11-10 RX ORDER — KETOROLAC TROMETHAMINE 30 MG/ML
30 INJECTION, SOLUTION INTRAMUSCULAR; INTRAVENOUS EVERY 6 HOURS PRN
Status: DISCONTINUED | OUTPATIENT
Start: 2017-11-10 | End: 2017-11-10 | Stop reason: HOSPADM

## 2017-11-10 RX ORDER — DEXAMETHASONE SODIUM PHOSPHATE 4 MG/ML
4 INJECTION, SOLUTION INTRA-ARTICULAR; INTRALESIONAL; INTRAMUSCULAR; INTRAVENOUS; SOFT TISSUE EVERY 10 MIN PRN
Status: DISCONTINUED | OUTPATIENT
Start: 2017-11-10 | End: 2017-11-10 | Stop reason: HOSPADM

## 2017-11-10 RX ORDER — NALOXONE HYDROCHLORIDE 0.4 MG/ML
.1-.4 INJECTION, SOLUTION INTRAMUSCULAR; INTRAVENOUS; SUBCUTANEOUS
Status: DISCONTINUED | OUTPATIENT
Start: 2017-11-10 | End: 2017-11-10 | Stop reason: HOSPADM

## 2017-11-10 RX ORDER — PROPOFOL 10 MG/ML
INJECTION, EMULSION INTRAVENOUS PRN
Status: DISCONTINUED | OUTPATIENT
Start: 2017-11-10 | End: 2017-11-10

## 2017-11-10 RX ORDER — HYDRALAZINE HYDROCHLORIDE 20 MG/ML
2.5-5 INJECTION INTRAMUSCULAR; INTRAVENOUS EVERY 10 MIN PRN
Status: DISCONTINUED | OUTPATIENT
Start: 2017-11-10 | End: 2017-11-10 | Stop reason: HOSPADM

## 2017-11-10 RX ORDER — SODIUM CHLORIDE, SODIUM LACTATE, POTASSIUM CHLORIDE, CALCIUM CHLORIDE 600; 310; 30; 20 MG/100ML; MG/100ML; MG/100ML; MG/100ML
INJECTION, SOLUTION INTRAVENOUS CONTINUOUS
Status: DISCONTINUED | OUTPATIENT
Start: 2017-11-10 | End: 2017-11-10 | Stop reason: HOSPADM

## 2017-11-10 RX ORDER — CLINDAMYCIN PHOSPHATE 900 MG/50ML
900 INJECTION, SOLUTION INTRAVENOUS SEE ADMIN INSTRUCTIONS
Status: DISCONTINUED | OUTPATIENT
Start: 2017-11-10 | End: 2017-11-10 | Stop reason: HOSPADM

## 2017-11-10 RX ORDER — FENTANYL CITRATE 50 UG/ML
INJECTION, SOLUTION INTRAMUSCULAR; INTRAVENOUS PRN
Status: DISCONTINUED | OUTPATIENT
Start: 2017-11-10 | End: 2017-11-10

## 2017-11-10 RX ORDER — PROMETHAZINE HYDROCHLORIDE 25 MG/ML
12.5 INJECTION, SOLUTION INTRAMUSCULAR; INTRAVENOUS
Status: DISCONTINUED | OUTPATIENT
Start: 2017-11-10 | End: 2017-11-10 | Stop reason: HOSPADM

## 2017-11-10 RX ORDER — OXYCODONE AND ACETAMINOPHEN 5; 325 MG/1; MG/1
1-2 TABLET ORAL
Status: COMPLETED | OUTPATIENT
Start: 2017-11-10 | End: 2017-11-10

## 2017-11-10 RX ORDER — OXYCODONE AND ACETAMINOPHEN 5; 325 MG/1; MG/1
1-2 TABLET ORAL EVERY 4 HOURS PRN
Qty: 30 TABLET | Refills: 0 | Status: SHIPPED | OUTPATIENT
Start: 2017-11-10 | End: 2017-11-13 | Stop reason: SINTOL

## 2017-11-10 RX ADMIN — SCOPALAMINE 1 PATCH: 1 PATCH, EXTENDED RELEASE TRANSDERMAL at 09:35

## 2017-11-10 RX ADMIN — FENTANYL CITRATE 100 MCG: 50 INJECTION, SOLUTION INTRAMUSCULAR; INTRAVENOUS at 11:11

## 2017-11-10 RX ADMIN — LIDOCAINE HYDROCHLORIDE 40 MG: 20 INJECTION, SOLUTION INFILTRATION; PERINEURAL at 11:15

## 2017-11-10 RX ADMIN — KETOROLAC TROMETHAMINE 30 MG: 30 INJECTION, SOLUTION INTRAMUSCULAR at 12:04

## 2017-11-10 RX ADMIN — CLINDAMYCIN PHOSPHATE 900 MG: 18 INJECTION, SOLUTION INTRAVENOUS at 11:07

## 2017-11-10 RX ADMIN — PROPOFOL 200 MG: 10 INJECTION, EMULSION INTRAVENOUS at 11:15

## 2017-11-10 RX ADMIN — FENTANYL CITRATE 50 MCG: 50 INJECTION, SOLUTION INTRAMUSCULAR; INTRAVENOUS at 13:01

## 2017-11-10 RX ADMIN — SODIUM CHLORIDE, POTASSIUM CHLORIDE, SODIUM LACTATE AND CALCIUM CHLORIDE: 600; 310; 30; 20 INJECTION, SOLUTION INTRAVENOUS at 09:34

## 2017-11-10 RX ADMIN — FENTANYL CITRATE 50 MCG: 50 INJECTION, SOLUTION INTRAMUSCULAR; INTRAVENOUS at 12:08

## 2017-11-10 RX ADMIN — ONDANSETRON 4 MG: 2 INJECTION INTRAMUSCULAR; INTRAVENOUS at 12:04

## 2017-11-10 RX ADMIN — ROCURONIUM BROMIDE 50 MG: 10 INJECTION INTRAVENOUS at 11:15

## 2017-11-10 RX ADMIN — HYDROMORPHONE HYDROCHLORIDE 0.5 MG: 1 INJECTION, SOLUTION INTRAMUSCULAR; INTRAVENOUS; SUBCUTANEOUS at 13:16

## 2017-11-10 RX ADMIN — LIDOCAINE HYDROCHLORIDE 20 ML: 10 INJECTION, SOLUTION EPIDURAL; INFILTRATION; INTRACAUDAL; PERINEURAL at 12:16

## 2017-11-10 RX ADMIN — MIDAZOLAM HYDROCHLORIDE 2 MG: 1 INJECTION, SOLUTION INTRAMUSCULAR; INTRAVENOUS at 11:17

## 2017-11-10 RX ADMIN — FENTANYL CITRATE 50 MCG: 50 INJECTION, SOLUTION INTRAMUSCULAR; INTRAVENOUS at 12:09

## 2017-11-10 RX ADMIN — OXYCODONE HYDROCHLORIDE AND ACETAMINOPHEN 2 TABLET: 5; 325 TABLET ORAL at 13:46

## 2017-11-10 RX ADMIN — FENTANYL CITRATE 50 MCG: 50 INJECTION, SOLUTION INTRAMUSCULAR; INTRAVENOUS at 12:52

## 2017-11-10 ASSESSMENT — LIFESTYLE VARIABLES: TOBACCO_USE: 1

## 2017-11-10 NOTE — OP NOTE
Preoperative diagnosis is meniscus tear of the left knee postoperative diagnosis is large bucket handle tear of the lateral meniscus of the left knee    Procedure performed was arthroscopic examination of left knee with repair of the posterior horn of the lateral meniscus    Anesthesia utilize: General anesthesia    Assessment blood loss less than 10 cc    Wound classification clean    Description of procedure: After patient anesthetized, the left lower extremity was prepped and draped.  Examination under anesthesia demonstrated a moderate effusion, the knee was locked shortening approximate 5  of full extension and had full flexion.  The knee was stable to varus valgus and drawer.  Medial and lateral scar portals were created and diagnostic arthroscopy performed.  Patellofemoral joint and the suprapatellar pouch appeared normal.  The medial and lateral gutters appeared normal.  The medial compartment appeared normal with intact articular cartilage and an intact meniscus.  The ACL and the PCL were intact on Epting to view the lateral compartment however there is a folded meniscu along the joint line.  The anterior one half of the lateral meniscus was intact the posterior horn was attached to the meniscus root and attached at approximately the junction between the anterior and posterior one half.  This tear was a curette on white zone and was reduced by placing the knee in slight flexion and varus stress and using a probe to position the meniscus back against the capsule.  Once this was done and was determined that the meniscus tear was repairable.  Using the fast fix suture anchors, the meniscus was anchored back to the capsule from the meniscus root the midportion of the tear an approximate centimeter posterior to the anterior edge of the tear.  Once this was done the area was probed and found to be quite stable, the meniscus could no longer be dislodged.  All bars were again inspected and additional pathology was  not noted.  The wounds were closed as a particular suture and a sterile dressing applied.  Patient was awakened from his anesthetic and taken to the recovery room.  Postoperative plan is knee immobilizer for ambulation, no flexion more than 45 , he may weight-bear as tolerated.

## 2017-11-10 NOTE — BRIEF OP NOTE
Left knee arthroscopic repair of posterior horn lateral meniscus  General anesthesia  Minimal ebl  Fast fix suture anchors x3  Wound class clean   no complications

## 2017-11-10 NOTE — ANESTHESIA PREPROCEDURE EVALUATION
Anesthesia Evaluation     . Pt has had prior anesthetic.     No history of anesthetic complications          ROS/MED HX    ENT/Pulmonary:     (+)tobacco use, Current use chewing packs/day  , . .    Neurologic:  - neg neurologic ROS     Cardiovascular:  - neg cardiovascular ROS       METS/Exercise Tolerance:     Hematologic:  - neg hematologic  ROS       Musculoskeletal:   (+) , , other musculoskeletal- TORN MENISCUS LEFT KNEE      GI/Hepatic:  - neg GI/hepatic ROS       Renal/Genitourinary:  - ROS Renal section negative       Endo:  - neg endo ROS       Psychiatric:     (+) psychiatric history other (comment) and schizophrenia      Infectious Disease:  - neg infectious disease ROS       Malignancy:      - no malignancy   Other:    - neg other ROS                 Physical Exam  Normal systems: pulmonary    Airway   Mallampati: III  TM distance: >3 FB  Neck ROM: full    Dental   (+) chipped and missing    Cardiovascular   Rhythm and rate: regular and normal      Pulmonary    breath sounds clear to auscultation                    Anesthesia Plan      History & Physical Review  History and physical reviewed and following examination; no interval change.    ASA Status:  2 .    NPO Status:  > 8 hours    Plan for General and LMA with Intravenous and Propofol induction. Maintenance will be Balanced.    PONV prophylaxis:  Ondansetron (or other 5HT-3), Scopolamine patch and Dexamethasone or Solumedrol       Postoperative Care  Postoperative pain management:  IV analgesics and Oral pain medications.      Consents  Anesthetic plan, risks, benefits and alternatives discussed with:  Patient..                          .

## 2017-11-10 NOTE — OR NURSING
Up w/o vertigo.Cms to left foot good.Strong pedal pulse.Patient and responsible adult given discharge instructions with no questions regarding instructions. Zoe score 19. Pain level 3/10.  Discharged from unit via  w/c. Patient discharged to home.

## 2017-11-10 NOTE — H&P (VIEW-ONLY)
[facility] [place]  [address]  [place] [address]  [phone]  Dept: [phone]    PRE-OP EVALUATION:  Today's date: 2017    Eddi Trejo (: 1990) presents for pre-operative evaluation assessment as requested by Dr. Pritchard.  He requires evaluation and anesthesia risk assessment prior to undergoing surgery/procedure for treatment of left knee .  Proposed procedure: surgery on left knee    Date of Surgery/ Procedure: 11/10/17  Time of Surgery/ Procedure: 8:30am  Hospital/Surgical Facility: [facility] [place]  Primary Physician: Corrie Chirinos  Type of Anesthesia Anticipated: to be determined    Patient has a Health Care Directive or Living Will:  NO    1. NO - Do you have a history of heart attack, stroke, stent, bypass or surgery on an artery in the head, neck, heart or legs?  2. NO - Do you ever have any pain or discomfort in your chest?  3. NO - Do you have a history of  Heart Failure?  4. NO - Are you troubled by shortness of breath when: walking on the level, up a slight hill or at night?  5. NO - Do you currently have a cold, bronchitis or other respiratory infection?  6. NO - Do you have a cough, shortness of breath or wheezing?  7. NO - Do you sometimes get pains in the calves of your legs when you walk?  8. NO - Do you or anyone in your family have previous history of blood clots?  9. NO - Do you or does anyone in your family have a serious bleeding problem such as prolonged bleeding following surgeries or cuts?  10. NO - Have you ever had problems with anemia or been told to take iron pills?  11. NO - Have you had any abnormal blood loss such as black, tarry or bloody stools, or abnormal vaginal bleeding?  12. NO - Have you ever had a blood transfusion?  13. NO - Have you or any of your relatives ever had problems with anesthesia?  14. NO - Do you have sleep apnea, excessive snoring or daytime drowsiness?  15. NO - Do you have any prosthetic heart valves?  16. NO - Do you have  prosthetic joints?  17. NO - Is there any chance that you may be pregnant?        HPI:                                                      Brief HPI related to upcoming procedure: Left knee meniscal injury will have arthroscopy      See problem list for active medical problems.  Problems all longstanding and stable, except as noted/documented.  See ROS for pertinent symptoms related to these conditions.                                                                                                  .    MEDICAL HISTORY:                                                    There are no active problems to display for this patient.     Past Medical History:   Diagnosis Date     Bronchitis 4/25/02     Headache 4/28/10     Open wound of forehead 4/28/10     Otitis 4/18/02     Schizophrenia (H) 4/28/10     URI (upper respiratory infection) 12/24/95     Past Surgical History:   Procedure Laterality Date     HC CREATE EARDRUM OPENING,GEN ANESTH      PE tubes as child     No current outpatient prescriptions on file.     OTC products: none    Allergies   Allergen Reactions     Penicillins       Latex Allergy: NO    Social History   Substance Use Topics     Smoking status: Never Smoker     Smokeless tobacco: Current User     Types: Chew     Alcohol use No     History   Drug Use No       REVIEW OF SYSTEMS:                                                    C: NEGATIVE for fever, chills, change in weight  I: NEGATIVE for worrisome rashes, moles or lesions  E: NEGATIVE for vision changes or irritation  E/M: NEGATIVE for ear, mouth and throat problems  R: NEGATIVE for significant cough or SOB  B: NEGATIVE for masses, tenderness or discharge  CV: NEGATIVE for chest pain, palpitations or peripheral edema  GI: NEGATIVE for nausea, abdominal pain, heartburn, or change in bowel habits  : NEGATIVE for frequency, dysuria, or hematuria  M: NEGATIVE for significant arthralgias or myalgia  N: NEGATIVE for weakness, dizziness or  "paresthesias  E: NEGATIVE for temperature intolerance, skin/hair changes  H: NEGATIVE for bleeding problems  P: NEGATIVE for changes in mood or affect    EXAM:                                                    /68 (BP Location: Left arm, Patient Position: Chair, Cuff Size: Adult Regular)  Pulse 71  Temp 98  F (36.7  C) (Tympanic)  Ht 5' 11\" (1.803 m)  Wt 199 lb (90.3 kg)  SpO2 98%  BMI 27.75 kg/m2    GENERAL APPEARANCE: healthy, alert and no distress     EYES: EOMI,  PERRL     HENT: ear canals and TM's normal and nose and mouth without ulcers or lesions     NECK: no adenopathy, no asymmetry, masses, or scars and thyroid normal to palpation     RESP: lungs clear to auscultation - no rales, rhonchi or wheezes     CV: regular rates and rhythm, normal S1 S2, no S3 or S4 and no murmur, click or rub     ABDOMEN:  soft, nontender, no HSM or masses and bowel sounds normal     MS:tenderness left knee     SKIN: no suspicious lesions or rashes     NEURO: Normal strength and tone, sensory exam grossly normal, mentation intact and speech normal     PSYCH: mentation appears normal. and affect normal/bright     LYMPHATICS: No axillary, cervical, or supraclavicular nodes    DIAGNOSTICS:                                                      Labs Resulted Today:   Results for orders placed or performed in visit on 11/09/17   CBC with platelets differential   Result Value Ref Range    WBC 7.4 4.0 - 11.0 10e9/L    RBC Count 4.47 4.4 - 5.9 10e12/L    Hemoglobin 13.9 13.3 - 17.7 g/dL    Hematocrit 40.1 40.0 - 53.0 %    MCV 90 78 - 100 fl    MCH 31.1 26.5 - 33.0 pg    MCHC 34.7 31.5 - 36.5 g/dL    RDW 12.2 10.0 - 15.0 %    Platelet Count 243 150 - 450 10e9/L    Diff Method Automated Method     % Neutrophils 60.8 %    % Lymphocytes 26.7 %    % Monocytes 10.8 %    % Eosinophils 0.9 %    % Basophils 0.5 %    % Immature Granulocytes 0.3 %    Nucleated RBCs 0 0 /100    Absolute Neutrophil 4.5 1.6 - 8.3 10e9/L    Absolute " Lymphocytes 2.0 0.8 - 5.3 10e9/L    Absolute Monocytes 0.8 0.0 - 1.3 10e9/L    Absolute Eosinophils 0.1 0.0 - 0.7 10e9/L    Absolute Basophils 0.0 0.0 - 0.2 10e9/L    Abs Immature Granulocytes 0.0 0 - 0.4 10e9/L    Absolute Nucleated RBC 0.0      7/20/17  1:18 PM 7/20/17  1:43 PM [id number] [facility] MRI    [facility] Interactive Report and InfoRx   View the interactive report   PACS Images   Show images for MR Knee Left w/o Contrast   Study Result         LEFT KNEE MRI     HISTORY:  Pain and instability of the left knee.  The patient has a  slipping sensation.     TECHNIQUE:  Axial proton density, axial proton density fat sat,  sagittal proton density fat sat, sagittal T2, coronal proton density  fat sat, coronal T1 imaging of the left knee was performed.     FINDINGS:  There is a moderate-sized joint effusion.  There is an  extensive displaced tear of the lateral meniscus.  The tear has a  bucket handle or flap-type configuration.  The tear of the posterior  horn mid portion involves the meniscocapsular junction.  There is a  large centrally and anteriorly displaced fragment.     The medial meniscus is intact.     The anterior and posterior cruciate ligaments are intact.  The  extensor mechanism is intact.  The medial collateral ligament and  lateral collateral complex are intact.     Cartilage thickness and signal within the knee is well maintained.     IMPRESSION:  1.  EXTENSIVE DISPLACED BUCKET HANDLE OR FLAP-TYPE TEAR OF THE LATERAL  MENISCUS WITH ANTERIOR AND CENTRAL DISPLACEMENT OF MENISCAL  COMPONENTS.  TEARING OCCURS TO THE PERIPHERAL MENISCOCAPSULAR  JUNCTION.     2.  THERE IS A MODERATE-SIZED JOINT EFFUSION.     3.  THE MEDIAL MENISCUS IS INTACT.  THERE IS NO EVIDENCE OF  LIGAMENTOUS INJURY OR OSSEOUS CONTUSION.                 SIGNATURE PAGE ONLY  Exam Date: Jul 20, 2017 01:43:00 PM  Author: ABDIEL NICHOLS  This report is final and signed            IMPRESSION:                                                     Reason for surgery/procedure: meniscal injury left knee so will have arthroscopy  Diagnosis/reason for consult:cardiopulmonary clearance    The proposed surgical procedure is considered INTERMEDIATE risk.    REVISED CARDIAC RISK INDEX  The patient has the following serious cardiovascular risks for perioperative complications such as (MI, PE, VFib and 3  AV Block):  No serious cardiac risks  INTERPRETATION: 0 risks: Class I (very low risk - 0.4% complication rate)    The patient has the following additional risks for perioperative complications:  No identified additional risks        RECOMMENDATIONS:                                                              APPROVAL GIVEN to proceed with proposed procedure, without further diagnostic evaluation. He can do 4 METS without difficulty. He'll be n.p.o. Tonight.  Is a nonsmoker.       Signed Electronically by: PHU Chirinos MD    Copy of this evaluation report is provided to requesting physician.    [facility] Preop Guidelines

## 2017-11-10 NOTE — IP AVS SNAPSHOT
[facility]/Phase II    [address] [address]    Phone:  [phone]                                       After Visit Summary   11/10/2017    Eddi Trejo    MRN: [id number]           After Visit Summary Signature Page     I have received my discharge instructions, and my questions have been answered. I have discussed any challenges I see with this plan with the nurse or doctor.    ..........................................................................................................................................  Patient/Patient Representative Signature      ..........................................................................................................................................  Patient Representative Print Name and Relationship to Patient    ..................................................               ................................................  Date                                            Time    ..........................................................................................................................................  Reviewed by Signature/Title    ...................................................              ..............................................  Date                                                            Time

## 2017-11-10 NOTE — DISCHARGE INSTRUCTIONS
POST OPERATIVE PATIENT INFORMATION  Knee Arthroscopy    DIET  No restrictions.  Drink plenty of fluids.  If you become nauseated, stay as quiet as possible and try fluids such as tea, soup, or lemon-lime pop.  DISCOMFORT  You may experience some discomfort.  To reduce discomfort, try the following:    Keep leg elevated on pillows as much as possible for 2-3 days to reduce swelling (swelling increases pain).    Apply an ice pack to your knee for 3 days after surgery to help reduce swelling, and periodically for the first 1-2 weeks post operatively.    Take aspirin, Ascriptin, Tylenol or the prescription you may have received.  ACTIVITY  It is not unusual to feel tired and weak the day of surgery.  Resting quietly is recommended, keeping the operative leg elevated.  You may put weight on your operative leg as tolerated using the crutches for balance, unless otherwise instructed.  Perform the knee exercises as instructed on the bottom of this sheet.  CARE OF THE OPERATIVE SITE  Keep the elastic wrap in place and keep the bandage dry with these exceptions:    REMOVE ACE BANDAGE AND DRESSINGS ON THE THIRD DAY FOLLOWING SURGERY.    YOU MAY TAKE A SHOWER AFTER DRESSINGS ARE REMOVED.  (NO TUB BATHS OR HOT TUBS)  IMPORTANT OBSERVATIONS  Check C-M-S of operative leg every 4 hours while you are awake for the first 48 hours:    C: color - should appear normal/pink    M: motion - able to move foot, wiggle toes    S: sensation - able to  feel : no numbness, tingling  If you experience changes in C-M-S and/or severe pain, you may remove the elastic bandages and reapply it - tight enough to provide support for the gauze dressing but loose enough to improve C-M-S.  The gauze dressing should NOT be removed when rewrapping the elastic bandage.    KNEE EXERCISES  Start leg exercises two days post-op (see below).  These exercises help maintain muscle tone in your leg and strengthen the muscles supporting the knee.  Do them a  minimum of three times a day; ten times each.  If you have questions about the exercises or problems doing them please contact your doctor.  Quad Sets. Lying on your back, press the back side of your knee down against the bed, tightening the muscles on the top of your thigh.  Hold for a count of five.  Relax and repeat.  Hamstring Isometrics.  Push your heel into the bed as if attempting to bend your knee.  You should feel the muscles on the back of your thigh tighten.  Hold for a count of five.  Relax and repeat.  CONTACT YOUR DOCTOR if you have any problems, such as:    Fever of 100 degrees or higher (a fever below 100 degrees may occur as a normal response to the surgical procedure).    Changes in C-M-S and/or pain, unrelieved by pain medication or rewrapping the elastic bandage.    Pain or tenderness in the calf of either leg.    If you have any questions, please call your surgeon or your local Hospital Emergency Room.          KNEE EXERCISES      Quadricep sets: Tighten your front thigh muscles (quadriceps), pressing your knee toward the floor.  Hold for 5 to 10 seconds; then relax.              Leg lifts: Lift your leg 8 to 12 inches, keeping your knee straight.  Hold for 5 seconds.  Lower your leg slowly back to the ground.    Post-Anesthesia Patient Instructions    IMMEDIATELY FOLLOWING SURGERY:  Do not drive or operate machinery for the first twenty four hours after surgery.  Do not make any important decisions for twenty four hours after surgery or while taking narcotic pain medications or sedatives.  If you develop intractable nausea and vomiting or a severe headache please notify your doctor immediately.    FOLLOW-UP:  Please make an appointment with your surgeon as instructed. You do not need to follow up with anesthesia unless specifically instructed to do so.    WOUND CARE INSTRUCTIONS (if applicable):  Keep a dry clean dressing on the anesthesia/puncture wound site if there is drainage.  Once the  wound has quit draining you may leave it open to air.  Generally you should leave the bandage intact for twenty four hours unless there is drainage.  If the epidural site drains for more than 36-48 hours please call the anesthesia department.    QUESTIONS?:  Please feel free to call your physician or the hospital  if you have any questions, and they will be happy to assist you.         Remove the scopolamine patch behind your left ear after 24 hours after application.   After removing the patch, wash your hands and the area behind your ear thoroughly with soap and water.   The patch will still contain some medicine after use.   To avoid accidental contact or ingestion by children or pets, fold the used patch in half with the sticky side together and throw away in the trash out of the reach of children and pets.

## 2017-11-10 NOTE — ANESTHESIA CARE TRANSFER NOTE
Patient: Eddi Trejo    Procedure(s):  Left Knee Arthroscopy with Menical Repair - Wound Class: I-Clean    Diagnosis: TORN MENISCUS LEFT KNEE  Diagnosis Additional Information: No value filed.    Anesthesia Type:   General, LMA     Note:  Airway :Nasal Cannula  Patient transferred to:PACU  Handoff Report: Identifed the Patient, Identified the Reponsible Provider, Reviewed the pertinent medical history, Discussed the surgical course, Reviewed Intra-OP anesthesia mangement and issues during anesthesia, Set expectations for post-procedure period and Allowed opportunity for questions and acknowledgement of understanding      Vitals: (Last set prior to Anesthesia Care Transfer)    CRNA VITALS  11/10/2017 1145 - 11/10/2017 1245      11/10/2017             Pulse: 57    SpO2: 100 %                Electronically Signed By: KELLY Spain CRNA  November 10, 2017  12:46 PM

## 2017-11-10 NOTE — IP AVS SNAPSHOT
MRN:[id number]                      After Visit Summary   11/10/2017    Eddi Trejo    MRN: [id number]           Thank you!     Thank you for choosing [facility] for your care. Our goal is always to provide you with excellent care. Hearing back from our patients is one way we can continue to improve our services. Please take a few minutes to complete the written survey that you may receive in the mail after you visit with us. Thank you!        Patient Information     Date Of Birth          1990        About your hospital stay     You were admitted on:  November 10, 2017 You last received care in the:  [facility] Preop/Phase II    You were discharged on:  November 10, 2017       Who to Call     For medical emergencies, please call 911.  For non-urgent questions about your medical care, please call your primary care provider or clinic, [phone]  For questions related to your surgery, please call your surgery clinic        Attending Provider     Provider Jp Gaytan DO Orthopedics       Primary Care Provider Office Phone # Fax #    Corrie Chirinos -[phone]      After Care Instructions     Discharge Instructions       Review outpatient procedure discharge instructions with patient as directed by Provider            Remove dressing - at 72 hours                  Your next 10 appointments already scheduled     Nov 21, 2017  1:20 PM CST   (Arrive by 1:00 PM)   Post Op with Jp Pritchard DO    ORTHOPEDICS ([facility] )    [address] [facility] [address] [phone]              Further instructions from your care team           POST OPERATIVE PATIENT INFORMATION  Knee Arthroscopy    DIET  No restrictions.  Drink plenty of fluids.  If you become nauseated, stay as quiet as possible and try fluids such as tea, soup, or lemon-lime pop.  DISCOMFORT  You may experience some discomfort.  To reduce discomfort, try the  following:    Keep leg elevated on pillows as much as possible for 2-3 days to reduce swelling (swelling increases pain).    Apply an ice pack to your knee for 3 days after surgery to help reduce swelling, and periodically for the first 1-2 weeks post operatively.    Take aspirin, Ascriptin, Tylenol or the prescription you may have received.  ACTIVITY  It is not unusual to feel tired and weak the day of surgery.  Resting quietly is recommended, keeping the operative leg elevated.  You may put weight on your operative leg as tolerated using the crutches for balance, unless otherwise instructed.  Perform the knee exercises as instructed on the bottom of this sheet.  CARE OF THE OPERATIVE SITE  Keep the elastic wrap in place and keep the bandage dry with these exceptions:    REMOVE ACE BANDAGE AND DRESSINGS ON THE THIRD DAY FOLLOWING SURGERY.    YOU MAY TAKE A SHOWER AFTER DRESSINGS ARE REMOVED.  (NO TUB BATHS OR HOT TUBS)  IMPORTANT OBSERVATIONS  Check C-M-S of operative leg every 4 hours while you are awake for the first 48 hours:    C: color - should appear normal/pink    M: motion - able to move foot, wiggle toes    S: sensation - able to  feel : no numbness, tingling  If you experience changes in C-M-S and/or severe pain, you may remove the elastic bandages and reapply it - tight enough to provide support for the gauze dressing but loose enough to improve C-M-S.  The gauze dressing should NOT be removed when rewrapping the elastic bandage.    KNEE EXERCISES  Start leg exercises two days post-op (see below).  These exercises help maintain muscle tone in your leg and strengthen the muscles supporting the knee.  Do them a minimum of three times a day; ten times each.  If you have questions about the exercises or problems doing them please contact your doctor.  Quad Sets. Lying on your back, press the back side of your knee down against the bed, tightening the muscles on the top of your thigh.  Hold for a count of  five.  Relax and repeat.  Hamstring Isometrics.  Push your heel into the bed as if attempting to bend your knee.  You should feel the muscles on the back of your thigh tighten.  Hold for a count of five.  Relax and repeat.  CONTACT YOUR DOCTOR if you have any problems, such as:    Fever of 100 degrees or higher (a fever below 100 degrees may occur as a normal response to the surgical procedure).    Changes in C-M-S and/or pain, unrelieved by pain medication or rewrapping the elastic bandage.    Pain or tenderness in the calf of either leg.    If you have any questions, please call your surgeon or your local Hospital Emergency Room.          KNEE EXERCISES      Quadricep sets: Tighten your front thigh muscles (quadriceps), pressing your knee toward the floor.  Hold for 5 to 10 seconds; then relax.              Leg lifts: Lift your leg 8 to 12 inches, keeping your knee straight.  Hold for 5 seconds.  Lower your leg slowly back to the ground.    Post-Anesthesia Patient Instructions    IMMEDIATELY FOLLOWING SURGERY:  Do not drive or operate machinery for the first twenty four hours after surgery.  Do not make any important decisions for twenty four hours after surgery or while taking narcotic pain medications or sedatives.  If you develop intractable nausea and vomiting or a severe headache please notify your doctor immediately.    FOLLOW-UP:  Please make an appointment with your surgeon as instructed. You do not need to follow up with anesthesia unless specifically instructed to do so.    WOUND CARE INSTRUCTIONS (if applicable):  Keep a dry clean dressing on the anesthesia/puncture wound site if there is drainage.  Once the wound has quit draining you may leave it open to air.  Generally you should leave the bandage intact for twenty four hours unless there is drainage.  If the epidural site drains for more than 36-48 hours please call the anesthesia department.    QUESTIONS?:  Please feel free to call your physician  "or the hospital  if you have any questions, and they will be happy to assist you.         Remove the scopolamine patch behind your left ear after 24 hours after application.   After removing the patch, wash your hands and the area behind your ear thoroughly with soap and water.   The patch will still contain some medicine after use.   To avoid accidental contact or ingestion by children or pets, fold the used patch in half with the sticky side together and throw away in the trash out of the reach of children and pets.       Pending Results     No orders found from 2017 to 2017.            Admission Information     Date & Time Provider Department Dept. Phone    11/10/2017 Jp Pritchard DO HI Preop/Phase [phone]      Your Vitals Were     Blood Pressure Temperature Respirations Height Weight Pulse Oximetry    110/70 97.2  F (36.2  C) (Oral) 18 1.803 m (5' 11\") 88 kg (194 lb) 91%    BMI (Body Mass Index)                   27.06 kg/m2           EyeLock[facility] Information     [employer]border lets you send messages to your doctor, view your test results, renew your prescriptions, schedule appointments and more. To sign up, go to [email or web address] . Click on \"Log in\" on the left side of the screen, which will take you to the Welcome page. Then click on \"Sign up Now\" on the right side of the page.     You will be asked to enter the access code listed below, as well as some personal information. Please follow the directions to create your username and password.     Your access code is: [email or web address]  Expires: 2018  3:56 PM     Your access code will  in 90 days. If you need help or a new code, please call your [facility] clinic or [phone].        Care EveryWhere ID     This is your Care EveryWhere ID. This could be used by other organizations to access your [facility] medical records  [id number]        Equal Access to Services     [facility] FINA AH: mike Lai, " mary kay sarathjacquelinearuna johnsciera jeong nelidaleyla dominguezaan ah. [facility] [phone].    ATENCIÓN: Si carmen sagastume, tiene a olivier disposición servicios gratuitos de asistencia lingüística. Llame al [phone].    We comply with applicable federal civil rights laws and [place] laws. We do not discriminate on the basis of race, color, national origin, age, disability, sex, sexual orientation, or gender identity.               Review of your medicines      START taking        Dose / Directions    oxyCODONE-acetaminophen 5-325 MG per tablet   Commonly known as:  PERCOCET   Used for:  H/O knee surgery        Dose:  1-2 tablet   Take 1-2 tablets by mouth every 4 hours as needed for pain (moderate to severe)   Quantity:  30 tablet   Refills:  0            Where to get your medicines      Some of these will need a paper prescription and others can be bought over the counter. Ask your nurse if you have questions.     Bring a paper prescription for each of these medications     oxyCODONE-acetaminophen 5-325 MG per tablet                Protect others around you: Learn how to safely use, store and throw away your medicines at www.disposemymeds.org.             Medication List: This is a list of all your medications and when to take them. Check marks below indicate your daily home schedule. Keep this list as a reference.      Medications           Morning Afternoon Evening Bedtime As Needed    oxyCODONE-acetaminophen 5-325 MG per tablet   Commonly known as:  PERCOCET   Take 1-2 tablets by mouth every 4 hours as needed for pain (moderate to severe)   Last time this was given:  2 tablets on 11/10/2017  1:46 PM

## 2017-11-11 NOTE — ANESTHESIA POSTPROCEDURE EVALUATION
Patient: Eddi Trejo    Procedure(s):  Left Knee Arthroscopy with Menical Repair - Wound Class: I-Clean    Diagnosis:TORN MENISCUS LEFT KNEE  Diagnosis Additional Information: No value filed.    Anesthesia Type:  General, LMA    Note:  Anesthesia Post Evaluation    Patient location during evaluation: Bedside  Patient participation: Able to fully participate in evaluation  Level of consciousness: awake and alert  Pain management: adequate  Airway patency: patent  Cardiovascular status: acceptable  Respiratory status: acceptable  Hydration status: acceptable  PONV: none             Last vitals:  Vitals:    11/10/17 1400 11/10/17 1415 11/10/17 1420   BP: 113/74 112/81    Resp: 20 20    Temp:  99  F (37.2  C)    SpO2: 94% 98% 98%         Electronically Signed By: KELLY Spain CRNA  November 11, 2017  9:44 AM

## 2017-11-13 ENCOUNTER — TELEPHONE (OUTPATIENT)
Dept: FAMILY MEDICINE | Facility: OTHER | Age: 27
End: 2017-11-13

## 2017-11-13 ENCOUNTER — TELEPHONE (OUTPATIENT)
Dept: ORTHOPEDICS | Facility: OTHER | Age: 27
End: 2017-11-13

## 2017-11-13 DIAGNOSIS — M25.562 ACUTE PAIN OF LEFT KNEE: Primary | ICD-10-CM

## 2017-11-13 RX ORDER — HYDROCODONE BITARTRATE AND ACETAMINOPHEN 5; 325 MG/1; MG/1
1 TABLET ORAL EVERY 6 HOURS PRN
Qty: 20 TABLET | Refills: 0 | Status: SHIPPED | OUTPATIENT
Start: 2017-11-13 | End: 2017-11-16

## 2017-11-13 NOTE — TELEPHONE ENCOUNTER
10:08 AM    Reason for Call: Phone Call    Description: Adrien had surgery on Friday and they gave him Percocet for pain and it is making him nauseas . Can Dr. Chirinos prescribe something else. Please call Shilpa to advise    Was an appointment offered for this call?   No    Preferred method for responding to this message:   [phone]  Shilpa    If we cannot reach you directly, may we leave a detailed response at the number you provided? Yes      Gardenia Burns

## 2017-11-13 NOTE — TELEPHONE ENCOUNTER
Patient notified of paperwork filled out and ready for  copy sent to scanning spoke with Shilpa Wife notified of paperwork will be up front at the [facility].  Arianna Duoglas LPN

## 2017-11-13 NOTE — TELEPHONE ENCOUNTER
Had left knee surgery on Friday 11/0/17 with dr Pritchard, Percocet was given and he is getting nauseated. Willing to change prescription?

## 2017-11-13 NOTE — TELEPHONE ENCOUNTER
Will do small amount of hydrocodone 5 mg which should be enough for this procedure  [facility]

## 2017-11-16 ENCOUNTER — TELEPHONE (OUTPATIENT)
Dept: FAMILY MEDICINE | Facility: OTHER | Age: 27
End: 2017-11-16

## 2017-11-16 DIAGNOSIS — M25.562 ACUTE PAIN OF LEFT KNEE: ICD-10-CM

## 2017-11-16 RX ORDER — HYDROCODONE BITARTRATE AND ACETAMINOPHEN 5; 325 MG/1; MG/1
1 TABLET ORAL EVERY 6 HOURS PRN
Qty: 20 TABLET | Refills: 0 | Status: SHIPPED | OUTPATIENT
Start: 2017-11-16 | End: 2017-11-21

## 2017-11-16 NOTE — TELEPHONE ENCOUNTER
"To:  YUMIKO Chirinos nurse  Patient is asking if he could get more pain medication due to knee pain \"to get me through until I see her on Tuesday 11/21\"   "

## 2017-11-16 NOTE — TELEPHONE ENCOUNTER
Called to notify patient this will be filled one more time but the next refill must come from Dr Pritchard. Patient was moaning in pain in the background. Verbalized understanding no more refills and will pick the script at the end of the day.

## 2017-11-20 ENCOUNTER — TELEPHONE (OUTPATIENT)
Dept: ORTHOPEDICS | Facility: OTHER | Age: 27
End: 2017-11-20

## 2017-11-20 NOTE — TELEPHONE ENCOUNTER
Patient notified Dr. Pritchard will discuss further with him on Appointment date 11/21/2017 and that his refill for medication is denied.  Arianna Douglas LPN

## 2017-11-20 NOTE — TELEPHONE ENCOUNTER
Patient called and is asking for refill on pain medication and is stating he is still having pain due to running around with 3 children, patient will follow up with you on 11/22/2017.  Arianna Douglas LPN

## 2017-11-21 ENCOUNTER — OFFICE VISIT (OUTPATIENT)
Dept: ORTHOPEDICS | Facility: OTHER | Age: 27
End: 2017-11-21
Attending: ORTHOPAEDIC SURGERY
Payer: COMMERCIAL

## 2017-11-21 VITALS
BODY MASS INDEX: 27.06 KG/M2 | TEMPERATURE: 98.6 F | SYSTOLIC BLOOD PRESSURE: 108 MMHG | WEIGHT: 194 LBS | OXYGEN SATURATION: 97 % | HEART RATE: 66 BPM | DIASTOLIC BLOOD PRESSURE: 60 MMHG

## 2017-11-21 DIAGNOSIS — Z98.890 S/P ARTHROSCOPIC KNEE SURGERY: Primary | ICD-10-CM

## 2017-11-21 PROCEDURE — 99024 POSTOP FOLLOW-UP VISIT: CPT | Performed by: ORTHOPAEDIC SURGERY

## 2017-11-21 RX ORDER — OXYCODONE AND ACETAMINOPHEN 5; 325 MG/1; MG/1
1-2 TABLET ORAL EVERY 6 HOURS PRN
Qty: 30 TABLET | Refills: 0 | Status: SHIPPED | OUTPATIENT
Start: 2017-11-21 | End: 2018-07-28

## 2017-11-21 RX ORDER — HYDROCODONE BITARTRATE AND ACETAMINOPHEN 5; 325 MG/1; MG/1
1-2 TABLET ORAL EVERY 4 HOURS PRN
Qty: 20 TABLET | Refills: 0 | Status: SHIPPED | OUTPATIENT
Start: 2017-11-21 | End: 2017-11-21 | Stop reason: SINTOL

## 2017-11-21 ASSESSMENT — PAIN SCALES - GENERAL: PAINLEVEL: SEVERE PAIN (6)

## 2017-11-21 NOTE — MR AVS SNAPSHOT
"              After Visit Summary   11/21/2017    Eddi Trejo    MRN: [id number]           Patient Information     Date Of Birth          1990        Visit Information        Provider Department      11/21/2017 1:20 PM Jp Pritchard DO  ORTHOPEDICS        Today's Diagnoses     S/P arthroscopic knee surgery    -  1       Follow-ups after your visit        Your next 10 appointments already scheduled     Dec 05, 2017  2:00 PM CST   (Arrive by 1:45 PM)   Return Visit with Jp Pritchard DO    ORTHOPEDICS ([facility] )    [address] [facility] [id number] [phone]              Who to contact     If you have questions or need follow up information about today's clinic visit or your schedule please contact  ORTHOPEDICS directly at [phone].  Normal or non-critical lab and imaging results will be communicated to you by RightSignaturehart, letter or phone within 4 business days after the clinic has received the results. If you do not hear from us within 7 days, please contact the clinic through RightSignaturehart or phone. If you have a critical or abnormal lab result, we will notify you by phone as soon as possible.  Submit refill requests through [facility] or call your pharmacy and they will forward the refill request to us. Please allow 3 business days for your refill to be completed.          Additional Information About Your Visit        MyChart Information     [facility]o lets you send messages to your doctor, view your test results, renew your prescriptions, schedule appointments and more. To sign up, go to [email or web address] . Click on \"Log in\" on the left side of the screen, which will take you to the Welcome page. Then click on \"Sign up Now\" on the right side of the page.     You will be asked to enter the access code listed below, as well as some personal information. Please follow the directions to create your username and password.     Your access code is: " [email or web address]  Expires: 2018  3:56 PM     Your access code will  in 90 days. If you need help or a new code, please call your [facility] or [phone].        Care EveryWhere ID     This is your Care EveryWhere ID. This could be used by other organizations to access your [facility] medical records  [id number]        Your Vitals Were     Pulse Temperature Pulse Oximetry BMI (Body Mass Index)          66 98.6  F (37  C) (Tympanic) 97% 27.06 kg/m2         Blood Pressure from Last 3 Encounters:   17 108/60   11/10/17 112/81   17 118/68    Weight from Last 3 Encounters:   17 194 lb (88 kg)   11/10/17 194 lb (88 kg)   17 199 lb (90.3 kg)              Today, you had the following     No orders found for display         Today's Medication Changes          These changes are accurate as of: 17  1:37 PM.  If you have any questions, ask your nurse or doctor.               Start taking these medicines.        Dose/Directions    oxyCODONE-acetaminophen 5-325 MG per tablet   Commonly known as:  PERCOCET   Used for:  S/P arthroscopic knee surgery   Started by:  Jp Pritchard DO        Dose:  1-2 tablet   Take 1-2 tablets by mouth every 6 hours as needed for moderate to severe pain or pain Maximum 4 tablet(s) per day   Quantity:  30 tablet   Refills:  0         Stop taking these medicines if you haven't already. Please contact your care team if you have questions.     HYDROcodone-acetaminophen 5-325 MG per tablet   Commonly known as:  NORCO   Stopped by:  Jp Pritchard DO                Where to get your medicines      Some of these will need a paper prescription and others can be bought over the counter.  Ask your nurse if you have questions.     Bring a paper prescription for each of these medications     oxyCODONE-acetaminophen 5-325 MG per tablet                Primary Care Provider Office Phone # Fax #    Corrie Chirinos -[phone]       [facility]  [address] [place] [address]        Equal Access to Services     [place]IE FINA : Hadii joselito liu makayla Sokayode, washawnada luqadaha, qanitzata kajacquelineda shelleymichaelaruna, ciera herrera javierbrittany hollingsworthaustinadriana leyva. So [facility] [phone].    ATENCIÓN: Si habla español, tiene a olivier disposición servicios gratuitos de asistencia lingüística. Llame al [phone].    We comply with applicable federal civil rights laws and [place] laws. We do not discriminate on the basis of race, color, national origin, age, disability, sex, sexual orientation, or gender identity.            Thank you!     Thank you for choosing  ORTHOPEDICS  for your care. Our goal is always to provide you with excellent care. Hearing back from our patients is one way we can continue to improve our services. Please take a few minutes to complete the written survey that you may receive in the mail after your visit with us. Thank you!             Your Updated Medication List - Protect others around you: Learn how to safely use, store and throw away your medicines at www.disposemymeds.org.          This list is accurate as of: 11/21/17  1:37 PM.  Always use your most recent med list.                   Brand Name Dispense Instructions for use Diagnosis    oxyCODONE-acetaminophen 5-325 MG per tablet    PERCOCET    30 tablet    Take 1-2 tablets by mouth every 6 hours as needed for moderate to severe pain or pain Maximum 4 tablet(s) per day    S/P arthroscopic knee surgery

## 2017-11-21 NOTE — NURSING NOTE
"Chief Complaint   Patient presents with     Surgical Followup     Left Knee       Initial /60 (BP Location: Right arm, Patient Position: Sitting, Cuff Size: Adult Regular)  Pulse 66  Temp 98.6  F (37  C) (Tympanic)  Wt 194 lb (88 kg)  SpO2 97%  BMI 27.06 kg/m2 Estimated body mass index is 27.06 kg/(m^2) as calculated from the following:    Height as of 11/10/17: 5' 11\" (1.803 m).    Weight as of this encounter: 194 lb (88 kg).  Medication Reconciliation: complete   Elizabeth Bartholomew      "

## 2017-11-21 NOTE — PROGRESS NOTES
Patient presents today for follow-up after his left knee arthroscopy for meniscus tear.  This was a posterior horn lateral meniscus tear and it was repaired successfully using the fast fix system.  The present time he is a trace of effusion, he is wearing his immobilizer and partial weightbearing as instructed.  There is no calf tenderness or evidence of complication.  The plan is continue partial weightbearing follow up in approximately 2-3 weeks to reassess, and to see if he would require physical therapy to restore his range of motion and strength./60 (BP Location: Right arm, Patient Position: Sitting, Cuff Size: Adult Regular)  Pulse 66  Temp 98.6  F (37  C) (Tympanic)  Wt 194 lb (88 kg)  SpO2 97%  BMI 27.06 kg/m2

## 2018-07-28 ENCOUNTER — HOSPITAL ENCOUNTER (EMERGENCY)
Facility: HOSPITAL | Age: 28
Discharge: HOME | End: 2018-07-28
Attending: NURSE PRACTITIONER | Admitting: NURSE PRACTITIONER
Payer: COMMERCIAL

## 2018-07-28 ENCOUNTER — APPOINTMENT (OUTPATIENT)
Dept: GENERAL RADIOLOGY | Facility: HOSPITAL | Age: 28
End: 2018-07-28
Attending: NURSE PRACTITIONER
Payer: COMMERCIAL

## 2018-07-28 VITALS
TEMPERATURE: 97.3 F | SYSTOLIC BLOOD PRESSURE: 147 MMHG | WEIGHT: 210 LBS | BODY MASS INDEX: 29.29 KG/M2 | RESPIRATION RATE: 16 BRPM | DIASTOLIC BLOOD PRESSURE: 98 MMHG | OXYGEN SATURATION: 98 % | HEART RATE: 86 BPM

## 2018-07-28 DIAGNOSIS — S90.31XA CONTUSION OF RIGHT FOOT, INITIAL ENCOUNTER: ICD-10-CM

## 2018-07-28 PROCEDURE — 25000132 ZZH RX MED GY IP 250 OP 250 PS 637: Performed by: NURSE PRACTITIONER

## 2018-07-28 PROCEDURE — 99213 OFFICE O/P EST LOW 20 MIN: CPT | Performed by: NURSE PRACTITIONER

## 2018-07-28 PROCEDURE — 73630 X-RAY EXAM OF FOOT: CPT | Mod: TC,RT

## 2018-07-28 PROCEDURE — G0463 HOSPITAL OUTPT CLINIC VISIT: HCPCS

## 2018-07-28 RX ORDER — IBUPROFEN 800 MG/1
800 TABLET, FILM COATED ORAL ONCE
Status: COMPLETED | OUTPATIENT
Start: 2018-07-28 | End: 2018-07-28

## 2018-07-28 RX ORDER — IBUPROFEN 800 MG/1
800 TABLET, FILM COATED ORAL EVERY 8 HOURS PRN
Qty: 30 TABLET | Refills: 0 | Status: SHIPPED | OUTPATIENT
Start: 2018-07-28 | End: 2018-08-05

## 2018-07-28 RX ADMIN — IBUPROFEN 800 MG: 800 TABLET ORAL at 16:54

## 2018-07-28 ASSESSMENT — ENCOUNTER SYMPTOMS
TROUBLE SWALLOWING: 0
WEAKNESS: 0
DYSURIA: 0
NUMBNESS: 1
COUGH: 0
APPETITE CHANGE: 0
FEVER: 0
ACTIVITY CHANGE: 1
PSYCHIATRIC NEGATIVE: 1

## 2018-07-28 NOTE — ED TRIAGE NOTES
Pt presents today with c/o pt was at work on Thursday and someone dropped a pallet of bricks onto his right foot. Pt is ambulating to room with a limp. Pt's foot has noticeable swelling and bruising to great toe and top of foot.

## 2018-07-28 NOTE — ED AVS SNAPSHOT
[facility]    [address]    [place] [address]    Phone:  [phone]                                       Eddi Trejo   MRN: [id number]    Department:  [facility]   Date of Visit:  7/28/2018           After Visit Summary Signature Page     I have received my discharge instructions, and my questions have been answered. I have discussed any challenges I see with this plan with the nurse or doctor.    ..........................................................................................................................................  Patient/Patient Representative Signature      ..........................................................................................................................................  Patient Representative Print Name and Relationship to Patient    ..................................................               ................................................  Date                                            Time    ..........................................................................................................................................  Reviewed by Signature/Title    ...................................................              ..............................................  Date                                                            Time

## 2018-07-28 NOTE — DISCHARGE INSTRUCTIONS
Take tylenol and/or ibuprofen for pain. Follow dosing on package.   Apply ice to right foot for 20 minutes every 1-2 hours while awake. Protect skin.   Elevate right foot as much as able.   See RICE handout.   Wear ace wrap to right foot while walking. Take off when resting.   Wear ortho shoe to right foot when walking. Take off to rest.  Follow up with PCP next week for work comp paperwork. No work until released by PCP.   Return to urgent care or emergency department with any increase in symptoms or concerns.

## 2018-07-28 NOTE — ED PROVIDER NOTES
"  History     Chief Complaint   Patient presents with     Foot Pain     \"Pallet of bricks fell on right foot this past Thursday night at work.\"      The history is provided by the patient. No  was used.     Eddi Trejo is a 28 year old male who presents with right foot pain. He was working 2 days ago and a pallet of bricks fell on his right foot. He was wearing steel toe boots. No interventions for symptoms. Denies further injury. He works for [employer]. Eating and drinking well. Bowel and bladder are working well.       Review of Systems   Constitutional: Positive for activity change. Negative for appetite change and fever.   HENT: Negative for trouble swallowing.    Respiratory: Negative for cough.    Genitourinary: Negative for dysuria.   Musculoskeletal:        Right foot pain.   Skin: Negative for rash.        Bruising to right foot.    Neurological: Positive for numbness. Negative for weakness.        Some numbness and tingling at times in right foot.    Psychiatric/Behavioral: Negative.        Physical Exam   BP: 147/98  Pulse: 86  Temp: 97.3  F (36.3  C)  Resp: 16  Weight: 95.3 kg (210 lb)  SpO2: 98 %      Physical Exam   Constitutional: He is oriented to person, place, and time. He appears well-developed and well-nourished. No distress.   HENT:   Head: Normocephalic.   Mouth/Throat: Oropharynx is clear and moist.   Neck: Normal range of motion. Neck supple.   Cardiovascular: Normal rate, regular rhythm, normal heart sounds and intact distal pulses.    No murmur heard.  Pulmonary/Chest: Effort normal. No respiratory distress. He has no wheezes. He has no rales.   Abdominal: Soft. He exhibits no distension.   Musculoskeletal: He exhibits edema and tenderness. He exhibits no deformity.        Feet:    CMS and ROM intact to right lower extremity. Right dorsalis pedis +2. Extension and flexion intact to all digits on right foot. Flexion and plantar flexion intact to right foot. " Tenderness and swelling to right anterior foot along great toe and 1st metatarsal region.     Neurological: He is alert and oriented to person, place, and time. He exhibits normal muscle tone.   Skin: Skin is warm and dry. He is not diaphoretic. No erythema. No pallor.   Eccomosis to right anterior foot to great toe and 1st metatarsal region. Right great toe nail intact without nail damage. No break to skin integrity.    Psychiatric: He has a normal mood and affect. His behavior is normal.   Nursing note and vitals reviewed.      ED Course     ED Course     Procedures    I personally reviewed the x-rays and there is NO fracture or dislocation. Radiology review pending and nurse will notify patient if there is any change in the treatment plan.    Results for orders placed or performed during the hospital encounter of 07/28/18   Foot  XR, G/E 3 views, right    Narrative    PROCEDURE:  XR FOOT RT G/E 3 VW    HISTORY: bricks fell on rt foot;     COMPARISON:  None.    TECHNIQUE:  3 views of the right foot were obtained.    FINDINGS:  No fracture or dislocation is identified. The joint spaces  are preserved.        Impression    IMPRESSION: No acute fracture.      KAMALJIT MARTEL MD     Medications   ibuprofen (ADVIL/MOTRIN) tablet 800 mg (800 mg Oral Given 7/28/18 1654)       Assessments & Plan (with Medical Decision Making)     Discussed plan of care. He verbalized understanding. All questions answered.     I have reviewed the nursing notes.    I have reviewed the findings, diagnosis, plan and need for follow up with the patient.  Discharged in stable condition.     New Prescriptions    IBUPROFEN (ADVIL/MOTRIN) 800 MG TABLET    Take 1 tablet (800 mg) by mouth every 8 hours as needed for moderate pain       Final diagnoses:   Contusion of right foot, initial encounter     Take tylenol and/or ibuprofen for pain. Follow dosing on package.   Apply ice to right foot for 20 minutes every 1-2 hours while awake. Protect skin.    Elevate right foot as much as able.   See RICE handout.   Wear ace wrap to right foot while walking. Take off when resting.   Wear ortho shoe to right foot when walking. Take off to rest.  Follow up with PCP next week for work comp paperwork. No work until released by PCP.   Return to urgent care or emergency department with any increase in symptoms or concerns.       SAVI Carvalho  7/28/2018  4:25 PM  URGENT CARE CLINIC       Carisa Barrios NP  07/28/18 5729

## 2018-07-28 NOTE — ED AVS SNAPSHOT
[facility]    [address]    [facility] [address]    Phone:  [phone]                                       Eddi Trejo   MRN: [id number]    Department:  [facility]   Date of Visit:  7/28/2018           Patient Information     Date Of Birth          1990        Your diagnoses for this visit were:     Contusion of right foot, initial encounter        You were seen by Carisa Barrios NP.      Follow-up Information     Follow up with Corrie Chirinos MD In 2 days.    Specialty:  Family Practice    Why:  For re-evaluation and work comp.     Contact information:    [address] [facility] [address] [phone]          Follow up with [facility].    Specialty:  EMERGENCY MEDICINE    Why:  As needed, If symptoms worsen    Contact information:    [address]  [facility] [address] [phone]    Additional information:    From [place]: Take [address]. Turn left at [address]. Turn left at the first stoplight on [address]. At the first stop sign, take a right onto [address]. Take a left into the parking lot and continue through until you reach the North enterance of the building.       From [place]: Take [address]. Take the [address] ramp towards [place]. Turn left onto [address]. Take a slight right onto [address]. Turn left at the first stoplight on [address]. At the first stop sign, take a right onto [address]. Take a left into the parking lot and continue through until you reach the North enterance of the building.       From [place]: Take [address]. Take a right at [address]. At the first stop sign, take a right onto [address]. Take a left into the parking lot and continue through until you reach the North enterance of the building.         Discharge Instructions       Take tylenol and/or ibuprofen for pain. Follow dosing on  package.   Apply ice to right foot for 20 minutes every 1-2 hours while awake. Protect skin.   Elevate right foot as much as able.   See RICE handout.   Wear ace wrap to right foot while walking. Take off when resting.   Wear ortho shoe to right foot when walking. Take off to rest.  Follow up with PCP next week for work comp paperwork. No work until released by PCP.   Return to urgent care or emergency department with any increase in symptoms or concerns.       Discharge References/Attachments     FOOT CONTUSION (ENGLISH)    BONE BRUISE (BONE CONTUSION), UNDERSTANDING (ENGLISH)    RICE (ENGLISH)         Review of your medicines      START taking        Dose / Directions Last dose taken    ibuprofen 800 MG tablet   Commonly known as:  ADVIL/MOTRIN   Dose:  800 mg   Quantity:  30 tablet        Take 1 tablet (800 mg) by mouth every 8 hours as needed for moderate pain   Refills:  0                Prescriptions were sent or printed at these locations (1 Prescription)                   [id number] - [place], [place] - [address]   [address] [place] [place] [address]    Telephone:  [phone]   Fax:  [phone]   Hours:                  E-Prescribed (1 of 1)         ibuprofen (ADVIL/MOTRIN) 800 MG tablet                Procedures and tests performed during your visit     Foot  XR, G/E 3 views, right      Orders Needing Specimen Collection     None      Pending Results     No orders found from 7/26/2018 to 7/29/2018.            Pending Culture Results     No orders found from 7/26/2018 to 7/29/2018.            Thank you for choosing [facility]       Thank you for choosing [facility] for your care. Our goal is always to provide you with excellent care. Hearing back from our patients is one way we can continue to improve our services. Please take a few minutes to complete the written survey that you may receive in the mail after you visit with us. Thank you!        [employer] Information     [employer] lets you send  "messages to your doctor, view your test results, renew your prescriptions, schedule appointments and more. To sign up, go to [email or web address] . Click on \"Log in\" on the left side of the screen, which will take you to the Welcome page. Then click on \"Sign up Now\" on the right side of the page.     You will be asked to enter the access code listed below, as well as some personal information. Please follow the directions to create your username and password.     Your access code is: [email or web address]  Expires: 10/26/2018  4:54 PM     Your access code will  in 90 days. If you need help or a new code, please call your [facility] clinic or [phone].        Care EveryWhere ID     This is your Care EveryWhere ID. This could be used by other organizations to access your [facility] medical records  [id number]        Equal Access to Services     ASTON HARDEN : Hadii joselito zapatao Jessenia, waaxda lusaturnino, qaybta kaalmada adeadenike, ciera hyde . So [facility] [phone].    ATENCIÓN: Si habla español, tiene a olivier disposición servicios gratuitos de asistencia lingüística. Llame al [phone].    We comply with applicable federal civil rights laws and [place] laws. We do not discriminate on the basis of race, color, national origin, age, disability, sex, sexual orientation, or gender identity.            After Visit Summary       This is your record. Keep this with you and show to your community pharmacist(s) and doctor(s) at your next visit.                  "

## 2018-08-17 ENCOUNTER — HOSPITAL ENCOUNTER (EMERGENCY)
Facility: HOSPITAL | Age: 28
Discharge: HOME | End: 2018-08-17
Attending: PHYSICIAN ASSISTANT | Admitting: PHYSICIAN ASSISTANT
Payer: COMMERCIAL

## 2018-08-17 VITALS
DIASTOLIC BLOOD PRESSURE: 74 MMHG | SYSTOLIC BLOOD PRESSURE: 134 MMHG | OXYGEN SATURATION: 99 % | RESPIRATION RATE: 16 BRPM | TEMPERATURE: 96.6 F

## 2018-08-17 DIAGNOSIS — S39.012A STRAIN OF LUMBAR REGION, INITIAL ENCOUNTER: ICD-10-CM

## 2018-08-17 DIAGNOSIS — R30.0 DYSURIA: ICD-10-CM

## 2018-08-17 LAB
ALBUMIN UR-MCNC: NEGATIVE MG/DL
APPEARANCE UR: ABNORMAL
BACTERIA #/AREA URNS HPF: ABNORMAL /HPF
BILIRUB UR QL STRIP: NEGATIVE
COLOR UR AUTO: ABNORMAL
GLUCOSE UR STRIP-MCNC: NEGATIVE MG/DL
HGB UR QL STRIP: NEGATIVE
KETONES UR STRIP-MCNC: NEGATIVE MG/DL
LEUKOCYTE ESTERASE UR QL STRIP: NEGATIVE
MUCOUS THREADS #/AREA URNS LPF: PRESENT /LPF
NITRATE UR QL: NEGATIVE
PH UR STRIP: 7 PH (ref 4.7–8)
RBC #/AREA URNS AUTO: 1 /HPF (ref 0–2)
SOURCE: ABNORMAL
SP GR UR STRIP: 1.01 (ref 1–1.03)
UROBILINOGEN UR STRIP-MCNC: NORMAL MG/DL (ref 0–2)
WBC #/AREA URNS AUTO: 4 /HPF (ref 0–5)

## 2018-08-17 PROCEDURE — G0463 HOSPITAL OUTPT CLINIC VISIT: HCPCS

## 2018-08-17 PROCEDURE — 81001 URINALYSIS AUTO W/SCOPE: CPT | Performed by: FAMILY MEDICINE

## 2018-08-17 PROCEDURE — 99213 OFFICE O/P EST LOW 20 MIN: CPT | Performed by: PHYSICIAN ASSISTANT

## 2018-08-17 ASSESSMENT — ENCOUNTER SYMPTOMS
PSYCHIATRIC NEGATIVE: 1
FATIGUE: 0
BACK PAIN: 0
FLANK PAIN: 0
DYSURIA: 1
CARDIOVASCULAR NEGATIVE: 1
APPETITE CHANGE: 0
FEVER: 0
NEUROLOGICAL NEGATIVE: 1
HEMATURIA: 0
FREQUENCY: 0
RESPIRATORY NEGATIVE: 1

## 2018-08-17 NOTE — ED AVS SNAPSHOT
[facility]    [address]    [place] [address]    Phone:  [phone]                                       Eddi Trejo   MRN: [id number]    Department[facility]  [facility]   Date of Visit:  8/17/2018           After Visit Summary Signature Page     I have received my discharge instructions, and my questions have been answered. I have discussed any challenges I see with this plan with the nurse or doctor.    ..........................................................................................................................................  Patient/Patient Representative Signature      ..........................................................................................................................................  Patient Representative Print Name and Relationship to Patient    ..................................................               ................................................  Date                                            Time    ..........................................................................................................................................  Reviewed by Signature/Title    ...................................................              ..............................................  Date                                                            Time

## 2018-08-17 NOTE — ED AVS SNAPSHOT
[facility]    [address] [address]    Phone:  [phone]                                       Eddi Trejo   MRN: [id number]    Department:  [facility]   Date of Visit:  8/17/2018           Patient Information     Date Of Birth          1990        Your diagnoses for this visit were:     Dysuria     Strain of lumbar region, initial encounter        You were seen by Loretta Paredes PA.      Follow-up Information     Follow up with Corrie Chirinos MD.    Specialty:  Family Practice    Why:  If symptoms worsen    Contact information:    [address]  [facility] [address] [phone]          Follow up with [facility].    Specialty:  EMERGENCY MEDICINE    Why:  If further concerns develop    Contact information:    [address] [address] [phone]    Additional information:    From [facility]: Take [address]. Turn left at [address]. Turn left at the first stoplight on [address]. At the first stop sign, take a right onto [address]. Take a left into the parking lot and continue through until you reach the North enterance of the building.       From [place]: Take [address]. Take the [address] ramp towards [place]. Turn left onto [address]. Take a slight right onto [address]. Turn left at the first stoplight on [address]. At the first stop sign, take a right onto [address]. Take a left into the parking lot and continue through until you reach the North enterance of the building.       From [place]: Take [address]. Take a right at [address]. At the first stop sign, take a right onto [address]. Take a left into the parking lot and continue through until you reach the North enterance of the building.       Discharge References/Attachments     DYSURIA (ENGLISH)    BACK SPRAIN/STRAIN (ENGLISH)         Review of  "your medicines      Notice     You have not been prescribed any medications.            Procedures and tests performed during your visit     UA reflex to Microscopic and Culture      Orders Needing Specimen Collection     Ordered          18 1430  UA reflex to Microscopic and Culture - STAT, Pri[facility]: STAT, Final result     Scheduled Task Status   18 1431 [employer] CC Reminder: Open   Order Class:  PCU Collect                  Pending Results     No orders found from 8/15/2018 to 2018.            Pending Culture Results     No orders found from 8/15/2018 to 2018.            Thank you for choosing [facility]       Thank you for choosing [facility] for your care. Our goal is always to provide you with excellent care. Hearing back from our patients is one way we can continue to improve our services. Please take a few minutes to complete the written survey that you may receive in the mail after you visit with us. Thank you!        [employer] Information     [employer] lets you send messages to your doctor, view your test results, renew your prescriptions, schedule appointments and more. To sign up, go to [email or web address] . Click on \"Log in\" on the left side of the screen, which will take you to the Welcome page. Then click on \"Sign up Now\" on the right side of the page.     You will be asked to enter the access code listed below, as well as some personal information. Please follow the directions to create your username and password.     Your access code is: [email or web address]  Expires: 10/26/2018  4:54 PM     Your access code will  in 90 days. If you need help or a new code, please call your [facility] clinic or [phone].        Care EveryWhere ID     This is your Care EveryWhere ID. This could be used by other organizations to access your [facility] medical records  [id number]        Equal Access to Services     ASTON HARDEN AH: Hadii mike Zamarripa, mary kay corrigan, " ciera pagan'aan ah. So [facility] [phone].    ATENCIÓN: Si habla español, tiene a olivier disposición servicios gratuitos de asistencia lingüística. Llame al [phone].    We comply with applicable federal civil rights laws and [place] laws. We do not discriminate on the basis of race, color, national origin, age, disability, sex, sexual orientation, or gender identity.            After Visit Summary       This is your record. Keep this with you and show to your community pharmacist(s) and doctor(s) at your next visit.

## 2018-08-17 NOTE — ED TRIAGE NOTES
Pt presents today with c/o difficulty urinating since Wednesday. Pt denies any chance of std's and wants to be checked for a bladder infection. Also c/o lower back pain that increases when bending.

## 2018-08-17 NOTE — ED PROVIDER NOTES
History     Chief Complaint   Patient presents with     Dysuria     and bilatral lower back pain. states he has been working in hot weather and drinking water. pain with bending and moving. denies blood in urine.      The history is provided by the patient. No  was used.     Eddi Trejo is a 28 year old male who has 2 days of LBP and burning with urination.  States there is no way he could have an STD. Denies any direct injury or fall. No loss of leg strength.  Denies any pelvic or abd pain. No n/v/d/f/c        Past Medical History:    Past Medical History:   Diagnosis Date     Bronchitis 02     Headache 4/28/10     Open wound of forehead 4/28/10     Otitis 02     Schizophrenia (H) 4/28/10     URI (upper respiratory infection) 95       Past Surgical History:    Past Surgical History:   Procedure Laterality Date     ARTHROSCOPY KNEE Left 11/10/2017    Procedure: ARTHROSCOPY KNEE;  Left Knee Arthroscopy with Menical Repair;  Surgeon: Jp Pritchard DO;  Location: [facility]      CREATE EARDRUM OPENING,GEN ANESTH      PE tubes as child       Family History:    Family History   Problem Relation Age of Onset     Asthma Mother      Colon Cancer Maternal Grandfather       at 58     Breast Cancer Maternal Grandmother      Diabetes Paternal Grandmother      Diabetes Paternal Aunt      Diabetes Maternal Aunt      C.A.D. No family hx of      Hypertension No family hx of        Social History:  Marital Status:  Single [1]  Social History   Substance Use Topics     Smoking status: Never Smoker     Smokeless tobacco: Former User     Types: Chew     Alcohol use No        Medications:      No current outpatient prescriptions on file.      Review of Systems   Constitutional: Negative for appetite change, fatigue and fever.   Respiratory: Negative.    Cardiovascular: Negative.    Genitourinary: Positive for dysuria. Negative for flank pain, frequency and hematuria.   Musculoskeletal:  Negative for back pain.   Neurological: Negative.    Psychiatric/Behavioral: Negative.        Physical Exam   BP: 134/74  Heart Rate: 52  Temp: 96.6  F (35.9  C)  Resp: 16  SpO2: 99 %      Physical Exam   Constitutional: He is oriented to person, place, and time. He appears well-developed and well-nourished. No distress.   Cardiovascular: Normal rate, regular rhythm and normal heart sounds.    Pulmonary/Chest: Effort normal and breath sounds normal. No respiratory distress.   Abdominal: Soft. Bowel sounds are normal. He exhibits no distension.   Musculoskeletal:   Back: no e/e/e/e, m/n/v intact 5/5 strength, +AFROM. Mild TTP along lumbar paraspinous muscles.    BLE: No e/e/e/e, +AFROM, 5/5 strength, m/n/v intact   Neurological: He is alert and oriented to person, place, and time.   Skin: He is not diaphoretic.   Psychiatric: He has a normal mood and affect.   Nursing note and vitals reviewed.      ED Course     ED Course     Procedures              Results for orders placed or performed during the hospital encounter of 08/17/18 (from the past 24 hour(s))   UA reflex to Microscopic and Culture   Result Value Ref Range    Color Urine Light Yellow     Appearance Urine Slightly Cloudy     Glucose Urine Negative NEG^Negative mg/dL    Bilirubin Urine Negative NEG^Negative    Ketones Urine Negative NEG^Negative mg/dL    Specific Gravity Urine 1.011 1.003 - 1.035    Blood Urine Negative NEG^Negative    pH Urine 7.0 4.7 - 8.0 pH    Protein Albumin Urine Negative NEG^Negative mg/dL    Urobilinogen mg/dL Normal 0.0 - 2.0 mg/dL    Nitrite Urine Negative NEG^Negative    Leukocyte Esterase Urine Negative NEG^Negative    Source Midstream Urine     RBC Urine 1 0 - 2 /HPF    WBC Urine 4 0 - 5 /HPF    Bacteria Urine None (A) NEG^Negative /HPF    Mucous Urine Present (A) NEG^Negative /LPF       Assessments & Plan (with Medical Decision Making)     I have reviewed the nursing notes.    I have reviewed the findings, diagnosis, plan and  need for follow up with the patient.        Final diagnoses:   Dysuria   Strain of lumbar region, initial encounter           Patient verbally educated and given appropriate education sheets for the diagnoses and has no questions.  Take medications as directed.   Follow up with your Primary Care provider if symptoms increase or if further concerns develop, return to the ER  Loretta Paredes Certified  Physician Assistant  8/17/2018  3:02 PM  URGENT CARE CLINIC      8/17/2018   [facility]     Loretta Paredes PA  08/17/18 9305

## 2018-12-02 ENCOUNTER — HOSPITAL ENCOUNTER (EMERGENCY)
Facility: HOSPITAL | Age: 28
Discharge: HOME | End: 2018-12-02
Attending: PHYSICIAN ASSISTANT | Admitting: PHYSICIAN ASSISTANT
Payer: COMMERCIAL

## 2018-12-02 VITALS
BODY MASS INDEX: 28 KG/M2 | WEIGHT: 200 LBS | RESPIRATION RATE: 18 BRPM | HEIGHT: 71 IN | SYSTOLIC BLOOD PRESSURE: 135 MMHG | DIASTOLIC BLOOD PRESSURE: 83 MMHG | TEMPERATURE: 96.7 F | OXYGEN SATURATION: 98 % | HEART RATE: 70 BPM

## 2018-12-02 DIAGNOSIS — K08.89 PAIN, DENTAL: ICD-10-CM

## 2018-12-02 DIAGNOSIS — K04.7 DENTAL ABSCESS: ICD-10-CM

## 2018-12-02 PROCEDURE — G0463 HOSPITAL OUTPT CLINIC VISIT: HCPCS

## 2018-12-02 PROCEDURE — 99213 OFFICE O/P EST LOW 20 MIN: CPT | Performed by: PHYSICIAN ASSISTANT

## 2018-12-02 RX ORDER — KETOROLAC TROMETHAMINE 10 MG/1
10 TABLET, FILM COATED ORAL EVERY 6 HOURS PRN
Qty: 20 TABLET | Refills: 0 | Status: SHIPPED | OUTPATIENT
Start: 2018-12-02 | End: 2019-03-23

## 2018-12-02 RX ORDER — PREDNISONE 20 MG/1
TABLET ORAL
Qty: 10 TABLET | Refills: 0 | Status: SHIPPED | OUTPATIENT
Start: 2018-12-02 | End: 2019-03-23

## 2018-12-02 RX ORDER — CLINDAMYCIN HCL 300 MG
300 CAPSULE ORAL 3 TIMES DAILY
Qty: 30 CAPSULE | Refills: 0 | Status: SHIPPED | OUTPATIENT
Start: 2018-12-02 | End: 2019-03-23

## 2018-12-02 ASSESSMENT — ENCOUNTER SYMPTOMS
SORE THROAT: 0
VOMITING: 0
APPETITE CHANGE: 0
SINUS PRESSURE: 0
FEVER: 0
RESPIRATORY NEGATIVE: 1
NEUROLOGICAL NEGATIVE: 1
CARDIOVASCULAR NEGATIVE: 1
FATIGUE: 0
NECK PAIN: 0
NECK STIFFNESS: 0
ABDOMINAL PAIN: 0
NAUSEA: 0
PSYCHIATRIC NEGATIVE: 1

## 2018-12-02 NOTE — ED AVS SNAPSHOT
[facility]    [address] [address]    Phone:  [phone]                                       Eddi Trejo   MRN: [id number]    Department:  [facility]   Date of Visit:  12/2/2018           Patient Information     Date Of Birth          1990        Your diagnoses for this visit were:     Dental abscess     Pain, dental        You were seen by Loretta Paredes PA.      Follow-up Information     Follow up with Corrie Chirinos MD.    Specialty:  Family Practice    Why:  If symptoms worsen, prior to your Dental Treatment    Contact information:    [address]  [facility] [address] [phone]          Follow up with [facility].    Specialty:  EMERGENCY MEDICINE    Why:  If further concerns develop    Contact information:    [address] [address] [phone]    Additional information:    From [facility]: Take [address]. Turn left at [address]. Turn left at the first stoplight on [address]. At the first stop sign, take a right onto [address]. Take a left into the parking lot and continue through until you reach the North enterance of the building.       From [place]: Take [address]. Take the [address] ramp towards [place]. Turn left onto [address]. Take a slight right onto [address]. Turn left at the first stoplight on [address]. At the first stop sign, take a right onto [address]. Take a left into the parking lot and continue through until you reach the North enterance of the building.       From [place]: Take [address]. Take a right at [address]. At the first stop sign, take a right onto [address]. Take a left into the parking lot and continue through until you reach the North enterance of the building.       Discharge References/Attachments     ABSCESS, DENTAL (ENGLISH)         Review of your medicines       START taking        Dose / Directions Last dose taken    clindamycin 300 MG capsule   Commonly known as:  CLEOCIN   Dose:  300 mg   Quantity:  30 capsule        Take 1 capsule (300 mg) by mouth 3 times daily for 10 days   Refills:  0        ketorolac 10 MG tablet   Commonly known as:  TORADOL   Dose:  10 mg   Quantity:  20 tablet        Take 1 tablet (10 mg) by mouth every 6 hours as needed for moderate pain   Refills:  0        predniSONE 20 MG tablet   Commonly known as:  DELTASONE   Quantity:  10 tablet        Take two tablets (= 40mg) each day for 5 (five) days   Refills:  0          Our records show that you are taking the medicines listed below. If these are incorrect, please call your family doctor or clinic.        Dose / Directions Last dose taken    IBUPROFEN PO   Dose:  800 mg        Take 800 mg by mouth every 8 hours as needed for moderate pain   Refills:  0        TYLENOL PO   Dose:  650 mg        Take 650 mg by mouth every 8 hours as needed for mild pain or fever   Refills:  0                Prescriptions were sent or printed at these locations (3 Prescriptions)                   [id number] - [place] - [address] [place] [address]    Telephone:  [phone]   Fax:  [phone]   Hours:                  E-Prescribed (3 of 3)         clindamycin (CLEOCIN) 300 MG capsule               ketorolac (TORADOL) 10 MG tablet               predniSONE (DELTASONE) 20 MG tablet                Orders Needing Specimen Collection     None      Pending Results     No orders found from 11/30/2018 to 12/3/2018.            Pending Culture Results     No orders found from 11/30/2018 to 12/3/2018.            Thank you for choosing [facility]       Thank you for choosing [facility] for your care. Our goal is always to provide you with excellent care. Hearing back from our patients is one way we can continue to improve our services. Please take a few minutes to complete the written  "survey that you may receive in the mail after you visit with us. Thank you!        [employer] Information     [email or web address] lets you send messages to your doctor, view your test results, renew your prescriptions, schedule appointments and more. To sign up, go to [email or web address] . Click on \"Log in\" on the left side of the screen, which will take you to the Welcome page. Then click on \"Sign up Now\" on the right side of the page.     You will be asked to enter the access code listed below, as well as some personal information. Please follow the directions to create your username and password.     Your access code is: [email or web address]  Expires: 3/2/2019  2:07 PM     Your access code will  in 90 days. If you need help or a new code, please call your [place] clinic or [phone].        Care EveryWhere ID     This is your Care EveryWhere ID. This could be used by other organizations to access your [place] medical records  [id number]        Equal Access to Services     MACIE HARDEN : Hadteetee Ruelas, waaxda lurichardadaha, qaybta kaalsandro corrigan, ciera hyde . So [facility] [phone].    ATENCIÓN: Si habla español, tiene a olivier disposición servicios gratuitos de asistencia lingüística. Llame al [phone].    We comply with applicable federal civil rights laws and [place] laws. We do not discriminate on the basis of race, color, national origin, age, disability, sex, sexual orientation, or gender identity.            After Visit Summary       This is your record. Keep this with you and show to your community pharmacist(s) and doctor(s) at your next visit.                  "

## 2018-12-02 NOTE — ED AVS SNAPSHOT
[facility]    [address]    [place] [address]    Phone:  [phone]                                       Eddi Trejo   MRN: [id number]    Department:  [facility]   Date of Visit:  12/2/2018           After Visit Summary Signature Page     I have received my discharge instructions, and my questions have been answered. I have discussed any challenges I see with this plan with the nurse or doctor.    ..........................................................................................................................................  Patient/Patient Representative Signature      ..........................................................................................................................................  Patient Representative Print Name and Relationship to Patient    ..................................................               ................................................  Date                                   Time    ..........................................................................................................................................  Reviewed by Signature/Title    ...................................................              ..............................................  Date                                               Time          22EPIC Rev 08/18

## 2018-12-02 NOTE — ED PROVIDER NOTES
History     Chief Complaint   Patient presents with     Dental Pain     Lower, left since Friday night     The history is provided by the patient. No  was used.     Eddi Trejo is a 28 year old male who has left lower dental pain x 2 days. No left cheek swelling. No n/v/f. Pt requesting antibiotics. He knows he needs a lot of dental work done        Past Medical History:    Past Medical History:   Diagnosis Date     Bronchitis 02     Headache 4/28/10     Open wound of forehead 4/28/10     Otitis 02     Schizophrenia (H) 4/28/10     URI (upper respiratory infection) 95       Past Surgical History:    Past Surgical History:   Procedure Laterality Date     ARTHROSCOPY KNEE Left 11/10/2017    Procedure: ARTHROSCOPY KNEE;  Left Knee Arthroscopy with Menical Repair;  Surgeon: Jp Pritchard DO;  Location: [facility]      CREATE EARDRUM OPENING,GEN ANESTH      PE tubes as child       Family History:    Family History   Problem Relation Age of Onset     Asthma Mother      Colon Cancer Maternal Grandfather       at 58     Breast Cancer Maternal Grandmother      Diabetes Paternal Grandmother      Diabetes Paternal Aunt      Diabetes Maternal Aunt      C.A.D. No family hx of      Hypertension No family hx of        Social History:  Marital Status:  Single [1]  Social History   Substance Use Topics     Smoking status: Never Smoker     Smokeless tobacco: Former User     Types: Chew     Alcohol use No        Medications:      Acetaminophen (TYLENOL PO)   clindamycin (CLEOCIN) 300 MG capsule   IBUPROFEN PO   ketorolac (TORADOL) 10 MG tablet   predniSONE (DELTASONE) 20 MG tablet         Review of Systems   Constitutional: Negative for appetite change, fatigue and fever.   HENT: Positive for dental problem. Negative for congestion, ear pain, sinus pressure and sore throat.    Respiratory: Negative.    Cardiovascular: Negative.    Gastrointestinal: Negative for abdominal pain, nausea  "and vomiting.   Genitourinary: Negative.    Musculoskeletal: Negative for neck pain and neck stiffness.   Skin: Negative for rash.   Neurological: Negative.    Psychiatric/Behavioral: Negative.        Physical Exam   BP: 135/83  Pulse: 70  Temp: 96.7  F (35.9  C)  Resp: 18  Height: 180.3 cm (5' 11\")  Weight: 90.7 kg (200 lb)  SpO2: 98 %      Physical Exam   Constitutional: He is oriented to person, place, and time. He appears well-developed and well-nourished. No distress.   HENT:   Head: Normocephalic and atraumatic.   Pt has diffuse severe dental caries and erosion. Tooth # 19 has great TTP. Surrounding gingiva has mild erythema/edema. Left cheek has no edema/erythema   Neck: Normal range of motion. Neck supple.   Cardiovascular: Normal rate.    Pulmonary/Chest: Effort normal.   Neurological: He is alert and oriented to person, place, and time.   Skin: He is not diaphoretic.   Psychiatric: He has a normal mood and affect.   Nursing note and vitals reviewed.      ED Course     ED Course     Procedures          No results found for this or any previous visit (from the past 24 hour(s)).    Medications - No data to display    Assessments & Plan (with Medical Decision Making)     I have reviewed the nursing notes.    I have reviewed the findings, diagnosis, plan and need for follow up with the patient.      Discharge Medication List as of 12/2/2018  2:10 PM      START taking these medications    Details   clindamycin (CLEOCIN) 300 MG capsule Take 1 capsule (300 mg) by mouth 3 times daily for 10 days, Disp-30 capsule, R-0, E-Prescribe      ketorolac (TORADOL) 10 MG tablet Take 1 tablet (10 mg) by mouth every 6 hours as needed for moderate pain, Disp-20 tablet, R-0, E-Prescribe      predniSONE (DELTASONE) 20 MG tablet Take two tablets (= 40mg) each day for 5 (five) days, Disp-10 tablet, R-0, E-Prescribe             Final diagnoses:   Dental abscess   Pain, dental         Patient given education sheet for each " diagnosis.  Patient has no further questions.  Take medication as as directed.  Follow up with dental provider with first available appointment.  Follow up with PCP if symptoms increase prior to your dental appointment  Return to ED if fever/further concerns develop  Loretta Paredes   Physician Assistant-C  12/2/2018  7:46 PM  [facility] CL[facility]  12/2/2018   [facility]     Loretta Paredes PA  12/02/18 1947

## 2019-03-23 ENCOUNTER — HOSPITAL ENCOUNTER (EMERGENCY)
Facility: HOSPITAL | Age: 29
Discharge: HOME | End: 2019-03-23
Attending: FAMILY MEDICINE | Admitting: FAMILY MEDICINE
Payer: COMMERCIAL

## 2019-03-23 VITALS
OXYGEN SATURATION: 98 % | TEMPERATURE: 98.3 F | DIASTOLIC BLOOD PRESSURE: 84 MMHG | HEART RATE: 72 BPM | SYSTOLIC BLOOD PRESSURE: 131 MMHG | RESPIRATION RATE: 16 BRPM

## 2019-03-23 DIAGNOSIS — Z98.890 HISTORY OF LATERAL MENISCUS REPAIR OF LEFT KNEE: ICD-10-CM

## 2019-03-23 DIAGNOSIS — S83.522A SPRAIN OF POSTERIOR CRUCIATE LIGAMENT OF LEFT KNEE, INITIAL ENCOUNTER: ICD-10-CM

## 2019-03-23 DIAGNOSIS — S83.8X2A INJURY OF MENISCUS OF KNEE, LEFT, INITIAL ENCOUNTER: Primary | ICD-10-CM

## 2019-03-23 PROCEDURE — 96372 THER/PROPH/DIAG INJ SC/IM: CPT

## 2019-03-23 PROCEDURE — 99283 EMERGENCY DEPT VISIT LOW MDM: CPT | Mod: Z6 | Performed by: FAMILY MEDICINE

## 2019-03-23 PROCEDURE — 25000128 H RX IP 250 OP 636: Performed by: FAMILY MEDICINE

## 2019-03-23 PROCEDURE — 99284 EMERGENCY DEPT VISIT MOD MDM: CPT | Mod: 25

## 2019-03-23 RX ORDER — KETOROLAC TROMETHAMINE 15 MG/ML
15 INJECTION, SOLUTION INTRAMUSCULAR; INTRAVENOUS ONCE
Status: COMPLETED | OUTPATIENT
Start: 2019-03-23 | End: 2019-03-23

## 2019-03-23 RX ORDER — KETOROLAC TROMETHAMINE 10 MG/1
10 TABLET, FILM COATED ORAL EVERY 6 HOURS PRN
Qty: 20 TABLET | Refills: 0 | Status: SHIPPED | OUTPATIENT
Start: 2019-03-23 | End: 2019-03-28

## 2019-03-23 RX ADMIN — KETOROLAC TROMETHAMINE 15 MG: 15 INJECTION, SOLUTION INTRAMUSCULAR; INTRAVENOUS at 02:22

## 2019-03-23 ASSESSMENT — ENCOUNTER SYMPTOMS
FEVER: 0
COLOR CHANGE: 0
HEADACHES: 0
SHORTNESS OF BREATH: 0
EYE REDNESS: 0
CONFUSION: 0
DIFFICULTY URINATING: 0
ARTHRALGIAS: 0
NECK STIFFNESS: 0
ABDOMINAL PAIN: 0

## 2019-03-23 NOTE — ED PROVIDER NOTES
"  History     Chief Complaint   Patient presents with     Knee Pain     getting out of bed, went to knees and feels like left knee is displaced     HPI  Eddi Trejo is a 29 year old man with history of left lateral meniscus repair 2018 who was standing with his left foot planted on the floor and his right knee on his bed when he rotated on his left knee and felt a \"pop\" with immediately pain on the lateral surface of his left knee at the previous site of his surgery. He has persistent pain at this region and is unable to bear weight. He also reports posterior knee pain. He did not fall or strike his knee on the floor.    Allergies:  Allergies   Allergen Reactions     Penicillins      Patient denies difficulty breathing with reaction. He reports a mild reaction and has previously tolerated keflex without difficulty.        Problem List:    There are no active problems to display for this patient.       Past Medical History:    Past Medical History:   Diagnosis Date     Bronchitis 02     Headache 4/28/10     Open wound of forehead 4/28/10     Otitis 02     Schizophrenia (H) 4/28/10     URI (upper respiratory infection) 95       Past Surgical History:    Past Surgical History:   Procedure Laterality Date     ARTHROSCOPY KNEE Left 11/10/2017    Procedure: ARTHROSCOPY KNEE;  Left Knee Arthroscopy with Menical Repair;  Surgeon: Jp Pritchard DO;  Location: [facility]      CREATE EARDRUM OPENING,GEN ANESTH      PE tubes as child       Family History:    Family History   Problem Relation Age of Onset     Asthma Mother      Colon Cancer Maternal Grandfather          at 58     Breast Cancer Maternal Grandmother      Diabetes Paternal Grandmother      Diabetes Paternal Aunt      Diabetes Maternal Aunt      C.A.D. No family hx of      Hypertension No family hx of        Social History:  Marital Status:  Single [1]  Social History     Tobacco Use     Smoking status: Never Smoker     Smokeless " tobacco: Former User     Types: Chew   Substance Use Topics     Alcohol use: No     Alcohol/week: 0.0 oz     Drug use: No        Medications:      ketorolac (TORADOL) 10 MG tablet   Acetaminophen (TYLENOL PO)         Review of Systems   Constitutional: Negative for fever.   HENT: Negative for congestion.    Eyes: Negative for redness.   Respiratory: Negative for shortness of breath.    Cardiovascular: Negative for chest pain.   Gastrointestinal: Negative for abdominal pain.   Genitourinary: Negative for difficulty urinating.   Musculoskeletal: Negative for arthralgias and neck stiffness.   Skin: Negative for color change.   Neurological: Negative for headaches.   Psychiatric/Behavioral: Negative for confusion.       Physical Exam   BP: 131/84  Pulse: 72  Temp: 98.3  F (36.8  C)  Resp: 16  SpO2: 98 %      Physical Exam  General: awake, alert, sitting comfortably    Head: atraumatic.    Ears: no drainage, external ears normal.    Eyes: no injection or discharge, non-icteric.    Nose: no discharge or congestion.    Mouth/Throat: moist mucous membranes.    Neck: supple, full & painless ROM.    Lungs: no retractions or acute respiratory distress. No tachypnea.    Extremities: 2+/4+ pulses bilaterally, right knee with normal & painless ROM, non-tender, joints normal, normal inspection. Moderate swelling at lateral surface of left knee with acute pain with palpation along the lateral joint line. Acute pain with palpation of posterior knee. Acute pain, but no ligamental instability on varus/valgus testing. Negative anterior/posterior drawer test.    Skin: warm, dry, no cyanosis or rash.    Psych: alert & oriented x 3, fluid, logical thought & speech.      ED Course           No results found for this or any previous visit (from the past 24 hour(s)).    Medications   ketorolac (TORADOL) injection 15 mg (15 mg Intramuscular Given 3/23/19 0222)       Assessments & Plan (with Medical Decision Making)   The patient is a 29 year  old man who presents with an acute injury of the lateral meniscus of the left knee and sprain of the posterior cruciate ligament.    1) Left knee injury - patient will be treated with rest, ice application, knee immobilizer placement with crutches for non-weightbearing and alternating APAP/ketorolac with plan for PCP follow-up in 5 - 7 days regarding this ER visit. The patient verbalizes agreement with this plan as well as to immediately return to the ER with any new/worsening S/Sx.        I have reviewed the nursing notes.    I have reviewed the findings, diagnosis, plan and need for follow up with the patient.         Medication List      Started    ketorolac 10 MG tablet  Commonly known as:  TORADOL  10 mg, Oral, EVERY 6 HOURS PRN        Discontinued    IBUPROFEN PO            Final diagnoses:   History of lateral meniscus repair of left knee   Sprain of posterior cruciate ligament of left knee, initial encounter   Injury of meniscus of knee, left, initial encounter       3/23/2019   [facility]     Onur Coates MD  03/23/19 0310

## 2019-03-23 NOTE — ED AVS SNAPSHOT
[facility]  [address]  [place] [address]  Phone:  [phone]                                    Eddi Trejo   MRN: [id number]    Department:  [facility]   Date of Visit:  3/23/2019           After Visit Summary Signature Page    I have received my discharge instructions, and my questions have been answered. I have discussed any challenges I see with this plan with the nurse or doctor.    ..........................................................................................................................................  Patient/Patient Representative Signature      ..........................................................................................................................................  Patient Representative Print Name and Relationship to Patient    ..................................................               ................................................  Date                                   Time    ..........................................................................................................................................  Reviewed by Signature/Title    ...................................................              ..............................................  Date                                               Time          22EPIC Rev 08/18

## 2019-03-23 NOTE — ED NOTES
"Patient to ED room 1 via wheelchair with fiance accompanying. Patient notes hx of surgical repair of meniscus on the left side. Patient states that he got out of bed and went to his knees and was \"turning\" and felt like knee became \"dislocated\". Patient into gown, sitting up at edge of bed with legs dangling, which is a position of comfort for patient. No bruising or swelling noted and CMS intact. Ice pack given for comfort.   "

## 2019-03-23 NOTE — ED NOTES
Patient states that he has used both crutches and a knee brace in the past, due to knee surgery. Patient states that he does not have any crutches currently.

## 2019-03-23 NOTE — ED NOTES
Patient and significant other given verbal and written discharge instructions and both verbalize understanding. Patient fitted for crutches and patient provided education on proper fit. Patient declined teaching on crutch walking as he had previously had crutches. Patient given written prescription for toradol. Patient left ambulatory on crutches.

## 2019-03-25 ENCOUNTER — TELEPHONE (OUTPATIENT)
Dept: FAMILY MEDICINE | Facility: OTHER | Age: 29
End: 2019-03-25

## 2019-03-25 DIAGNOSIS — S83.8X2A INJURY OF MENISCUS OF KNEE, LEFT, INITIAL ENCOUNTER: Primary | ICD-10-CM

## 2019-03-25 NOTE — TELEPHONE ENCOUNTER
11:43 AM    Reason for Call: Phone Call    Description: Patient called stated that he injured his left knee on 3/23/19. patient is wanting a ortho referral placed to have left knee examine and fixed. Patient would like to have referral placed for [place] but not like to see Dr. Pritchard.   Patient seen at [facility] on 3/23/19 for History of lateral meniscus repair of left knee      Sprain of posterior cruciate ligament of left knee, initial encounter       Injury of meniscus of knee, left, initial encounter     Was an appointment offered for this call? No  If yes : Appointment type              Date    Preferred method for responding to this message: Telephone Call  What is your phone number ?[phone]    If we cannot reach you directly, may we leave a detailed response at the number you provided? Yes    Can this message wait until your PCP/provider returns, if available today? Not applicable    Sammi Cantu MA

## 2019-03-26 DIAGNOSIS — M25.562 LEFT KNEE PAIN: Primary | ICD-10-CM

## 2019-03-27 ENCOUNTER — ANCILLARY PROCEDURE (OUTPATIENT)
Dept: GENERAL RADIOLOGY | Facility: OTHER | Age: 29
End: 2019-03-27
Attending: ORTHOPAEDIC SURGERY
Payer: COMMERCIAL

## 2019-03-27 ENCOUNTER — OFFICE VISIT (OUTPATIENT)
Dept: ORTHOPEDICS | Facility: OTHER | Age: 29
End: 2019-03-27
Attending: FAMILY MEDICINE
Payer: COMMERCIAL

## 2019-03-27 VITALS
WEIGHT: 200 LBS | TEMPERATURE: 97.9 F | BODY MASS INDEX: 28 KG/M2 | HEIGHT: 71 IN | SYSTOLIC BLOOD PRESSURE: 120 MMHG | OXYGEN SATURATION: 97 % | HEART RATE: 67 BPM | DIASTOLIC BLOOD PRESSURE: 62 MMHG

## 2019-03-27 DIAGNOSIS — M25.562 LEFT KNEE PAIN: ICD-10-CM

## 2019-03-27 DIAGNOSIS — S83.282D ACUTE LATERAL MENISCUS TEAR OF LEFT KNEE, SUBSEQUENT ENCOUNTER: Primary | ICD-10-CM

## 2019-03-27 DIAGNOSIS — Z72.0 TOBACCO ABUSE: ICD-10-CM

## 2019-03-27 DIAGNOSIS — S83.8X2A INJURY OF MENISCUS OF KNEE, LEFT, INITIAL ENCOUNTER: ICD-10-CM

## 2019-03-27 PROCEDURE — 73564 X-RAY EXAM KNEE 4 OR MORE: CPT | Mod: TC

## 2019-03-27 PROCEDURE — 99214 OFFICE O/P EST MOD 30 MIN: CPT | Performed by: ORTHOPAEDIC SURGERY

## 2019-03-27 ASSESSMENT — PAIN SCALES - GENERAL: PAINLEVEL: SEVERE PAIN (7)

## 2019-03-27 ASSESSMENT — MIFFLIN-ST. JEOR: SCORE: 1889.32

## 2019-03-27 NOTE — PROGRESS NOTES
"Occupational Visit     Allergies   Allergen Reactions     Penicillins      Patient denies difficulty breathing with reaction. He reports a mild reaction and has previously tolerated keflex without difficulty.      Chief Complaint: Acute Left Knee Pain    Patient Profile: 29-year-old, right-handed , tobacco chewer, patient of Dr. 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  A similar event happened on 7/6/2019 in his home.  He presented to the ED today after each event, and followed up in this office on 7/19/2017 when an MRI was ordered.  The study,  performed on 7/20/2017, showed a bucket-handle tear of the lateral meniscus.  He underwent an arthroscopic repair of the meniscus by Dr. Pritchard on 11/10/2017.  His knee did well until 3/23/2019 when, as he got out of bed, he pivoted on his left leg and felt and heard a pop from the left knee.  His left knee has been extremely painful ever since.  He presented to the ER on the day of the injury.  He was placed in a knee immobilizer and prescribed Toradol.  He was put on crutches.    Subjective: Today he describes his pain as a 7/10, sharp and constant pain on the lateral aspect of his left knee.  He tried ibuprofen, ice and Toradol with minimal relief. He notes swelling, numbness and tingling from the distal lateral quadriceps down to the lateral joint line of his left knee.  He has been out of work since the injury.    Nothing is changed with respect to his musculoskeletal or neurologic review of systems since seen last, other than what is above.    He chews tobacco and is not interested in a referral to the Quit Plan.    Objective: /62   Pulse 67   Temp 97.9  F (36.6  C) (Tympanic)   Ht 1.803 m (5' 11\")   Wt 90.7 kg (200 lb)   SpO2 97%   BMI 27.89 kg/m    Age-appropriate, pleasant 29-year-old male in no acute distress. " He is awake, alert and oriented.  His mood and affect are appropriate.  His head is normocephalic and atraumatic.  His sclerae are clear.  He hears me normally.  His respiratory efforts are unlabored.  His lungs are clear to auscultation.  There are no cardiac murmurs or gallops.  Bowel sounds are normal. There are no carotid bruits.     He is utilizing a knee immobilizer and crutches for ambulation. He is exquisitely tender over his lateral joint line of the left knee.  There is a mild joint effusion. Active range of motion is 0-60 degrees, limited by pain.  The knee is stable to ligamentous stressing.  The neurovascular status of the left lower extremity is intact..    X-rays; films of the left knee taken today reveal an effusion.  There are no acute bony abnormalities.    Impression:  #1.  Acute lateral meniscus re-tear, left knee due to failure of prior repair attempt  #2.  Locked knee left  #3.  Tobacco abuse    Causality: His left knee meniscal re-tear represents a failure of the prior repair of a work related meniscal tear.  His present tear and the surgery I recommend, therefore, are  causally related to the injury of 5/12/2017.    Plan: We discussed the option of getting an MRI but I do not feel it is absolutely necessary given that his knee is locked.  He agreed.  I recommended urgent arthroscopic intervention, and discussed the risks of arthroscopy.  He does not want a repeat attempt at meniscal repair.  He would rather have a partial meniscectomy knowing that it may lead to DJD in the future.  I answered all his questions.  He consented to the procedure which is scheduled for 4/1/2019.  The  surgery order was placed.

## 2019-03-27 NOTE — NURSING NOTE
"Chief Complaint   Patient presents with     Knee Pain     NPT  Left Knee  Xrays First   possible work comp (previous injury)       Initial /62   Pulse 67   Temp 97.9  F (36.6  C) (Tympanic)   Ht 1.803 m (5' 11\")   Wt 90.7 kg (200 lb)   SpO2 97%   BMI 27.89 kg/m   Estimated body mass index is 27.89 kg/m  as calculated from the following:    Height as of this encounter: 1.803 m (5' 11\").    Weight as of this encounter: 90.7 kg (200 lb).  Medication Reconciliation: complete    Elizabeth Bartholomew LPN  "

## 2019-03-27 NOTE — PATIENT INSTRUCTIONS
A preoperative history and physical, your surgery and a follow up visit will be scheduled. Remember that you must have nothing to eat or drink after midnight before surgery, and that you must have someone drive you home after surgery as you cannot drive yourself.  If you need any forms filled out ( for example, for insurance or work) get them to the office staff before your surgery date.

## 2019-03-28 ENCOUNTER — OFFICE VISIT (OUTPATIENT)
Dept: FAMILY MEDICINE | Facility: OTHER | Age: 29
End: 2019-03-28
Attending: PHYSICIAN ASSISTANT
Payer: COMMERCIAL

## 2019-03-28 VITALS
OXYGEN SATURATION: 98 % | HEART RATE: 76 BPM | HEIGHT: 71 IN | SYSTOLIC BLOOD PRESSURE: 106 MMHG | DIASTOLIC BLOOD PRESSURE: 58 MMHG | TEMPERATURE: 98.8 F | WEIGHT: 205 LBS | BODY MASS INDEX: 28.7 KG/M2

## 2019-03-28 DIAGNOSIS — M62.838 MUSCLE SPASM: ICD-10-CM

## 2019-03-28 DIAGNOSIS — Z01.818 PREOP GENERAL PHYSICAL EXAM: Primary | ICD-10-CM

## 2019-03-28 LAB
BASOPHILS # BLD AUTO: 0 10E9/L (ref 0–0.2)
BASOPHILS NFR BLD AUTO: 0.4 %
DIFFERENTIAL METHOD BLD: NORMAL
EOSINOPHIL # BLD AUTO: 0.1 10E9/L (ref 0–0.7)
EOSINOPHIL NFR BLD AUTO: 0.6 %
ERYTHROCYTE [DISTWIDTH] IN BLOOD BY AUTOMATED COUNT: 12.2 % (ref 10–15)
HCT VFR BLD AUTO: 41.3 % (ref 40–53)
HGB BLD-MCNC: 14.2 G/DL (ref 13.3–17.7)
IMM GRANULOCYTES # BLD: 0 10E9/L (ref 0–0.4)
IMM GRANULOCYTES NFR BLD: 0.2 %
LYMPHOCYTES # BLD AUTO: 1.7 10E9/L (ref 0.8–5.3)
LYMPHOCYTES NFR BLD AUTO: 21.5 %
MCH RBC QN AUTO: 30.5 PG (ref 26.5–33)
MCHC RBC AUTO-ENTMCNC: 34.4 G/DL (ref 31.5–36.5)
MCV RBC AUTO: 89 FL (ref 78–100)
MONOCYTES # BLD AUTO: 0.6 10E9/L (ref 0–1.3)
MONOCYTES NFR BLD AUTO: 7.4 %
NEUTROPHILS # BLD AUTO: 5.7 10E9/L (ref 1.6–8.3)
NEUTROPHILS NFR BLD AUTO: 69.9 %
NRBC # BLD AUTO: 0 10*3/UL
NRBC BLD AUTO-RTO: 0 /100
PLATELET # BLD AUTO: 274 10E9/L (ref 150–450)
RBC # BLD AUTO: 4.66 10E12/L (ref 4.4–5.9)
WBC # BLD AUTO: 8.1 10E9/L (ref 4–11)

## 2019-03-28 PROCEDURE — 99214 OFFICE O/P EST MOD 30 MIN: CPT | Performed by: PHYSICIAN ASSISTANT

## 2019-03-28 PROCEDURE — 36415 COLL VENOUS BLD VENIPUNCTURE: CPT | Performed by: PHYSICIAN ASSISTANT

## 2019-03-28 PROCEDURE — 85025 COMPLETE CBC W/AUTO DIFF WBC: CPT | Performed by: PHYSICIAN ASSISTANT

## 2019-03-28 RX ORDER — HYDROCODONE BITARTRATE AND ACETAMINOPHEN 5; 325 MG/1; MG/1
1 TABLET ORAL 3 TIMES DAILY PRN
Qty: 18 TABLET | Refills: 0 | Status: SHIPPED | OUTPATIENT
Start: 2019-03-28 | End: 2019-04-16

## 2019-03-28 RX ORDER — BACLOFEN 10 MG/1
10 TABLET ORAL 3 TIMES DAILY
Qty: 45 TABLET | Refills: 1 | Status: SHIPPED | OUTPATIENT
Start: 2019-03-28 | End: 2019-04-27

## 2019-03-28 ASSESSMENT — MIFFLIN-ST. JEOR: SCORE: 1912

## 2019-03-28 ASSESSMENT — PAIN SCALES - GENERAL: PAINLEVEL: SEVERE PAIN (6)

## 2019-03-28 NOTE — NURSING NOTE
"Chief Complaint   Patient presents with     Pre-Op Exam       Initial /58 (BP Location: Left arm, Patient Position: Sitting, Cuff Size: Adult Large)   Pulse 76   Temp 98.8  F (37.1  C) (Tympanic)   Ht 1.803 m (5' 11\")   Wt 93 kg (205 lb)   SpO2 98%   BMI 28.59 kg/m   Estimated body mass index is 28.59 kg/m  as calculated from the following:    Height as of this encounter: 1.803 m (5' 11\").    Weight as of this encounter: 93 kg (205 lb).  Medication Reconciliation: complete    Porsche Young LPN  "

## 2019-03-28 NOTE — PROGRESS NOTES
[facility]  [address]  [place] [address]  [phone]  Dept: [phone]    PRE-OP EVALUATION:  Today's date: 3/28/2019    Eddi Trejo (: 1990) presents for pre-operative evaluation assessment as requested by Dr. Moralez.  He requires evaluation and anesthesia risk assessment prior to undergoing surgery/procedure for treatment of left knee arthroscopy .    Proposed Surgery/ Procedure: left knee arthroscopy  Date of Surgery/ Procedure: 19  Time of Surgery/ Procedure: [facility]  Hospital/Surgical Facility: [facility]  Primary Physician: Corrie Chirinos  Type of Anesthesia Anticipated: to be determined    Patient has a Health Care Directive or Living Will:  NO    1. NO - Do you have a history of heart attack, stroke, stent, bypass or surgery on an artery in the head, neck, heart or legs?  2. NO - Do you ever have any pain or discomfort in your chest?  3. NO - Do you have a history of  Heart Failure?  4. NO - Are you troubled by shortness of breath when: walking on the level, up a slight hill or at night?  5. NO - Do you currently have a cold, bronchitis or other respiratory infection?  6. NO - Do you have a cough, shortness of breath or wheezing?  7. NO - Do you sometimes get pains in the calves of your legs when you walk?  8. NO - Do you or anyone in your family have previous history of blood clots?  9. NO - Do you or does anyone in your family have a serious bleeding problem such as prolonged bleeding following surgeries or cuts?  10. NO - Have you ever had problems with anemia or been told to take iron pills?  11. NO - Have you had any abnormal blood loss such as black, tarry or bloody stools, or abnormal vaginal bleeding?  12. NO - Have you ever had a blood transfusion?  13. NO - Have you or any of your relatives ever had problems with anesthesia?  14. NO - Do you have sleep apnea, excessive snoring or daytime drowsiness?  15. NO - Do you have any prosthetic heart  valves?  16. NO - Do you have prosthetic joints?  17. NO - Is there any chance that you may be pregnant?      HPI:     HPI related to upcoming procedure: has a tear of his left knee from a twisting action on the left. Hx of tear in the meniscus 2 years ago. Again has recurred. Saw Dr. Kyle and will be having surgery on April 1, 2019.       See problem list for active medical problems.  Problems all longstanding and stable, except as noted/documented.  See ROS for pertinent symptoms related to these conditions.                                                                                                                                                          .    MEDICAL HISTORY:   There are no active problems to display for this patient.     Past Medical History:   Diagnosis Date     Bronchitis 4/25/02     Headache 4/28/10     Open wound of forehead 4/28/10     Otitis 4/18/02     Schizophrenia (H) 4/28/10     URI (upper respiratory infection) 12/24/95     Past Surgical History:   Procedure Laterality Date     ARTHROSCOPY KNEE Left 11/10/2017    Procedure: ARTHROSCOPY KNEE;  Left Knee Arthroscopy with Menical Repair;  Surgeon: Jp Pritchard DO;  Location: [facility] OR      CREATE EARDRUM OPENING,GEN ANESTH      PE tubes as child     Current Outpatient Medications   Medication Sig Dispense Refill     Acetaminophen (TYLENOL PO) Take 650 mg by mouth every 8 hours as needed for mild pain or fever       OTC products: None, except as noted above    Allergies   Allergen Reactions     Penicillins      Patient denies difficulty breathing with reaction. He reports a mild reaction and has previously tolerated keflex without difficulty.       Latex Allergy: NO    Social History     Tobacco Use     Smoking status: Never Smoker     Smokeless tobacco: Current User     Types: Chew     Tobacco comment: pt denied quit plan 3/37/19   Substance Use Topics     Alcohol use: No     Alcohol/week: 0.0 oz     History   Drug Use No  "      REVIEW OF SYSTEMS:   CONSTITUTIONAL: NEGATIVE for fever, chills, change in weight  INTEGUMENTARY/SKIN: NEGATIVE for worrisome rashes, moles or lesions  EYES: NEGATIVE for vision changes or irritation  ENT/MOUTH: NEGATIVE for ear, mouth and throat problems  RESP: NEGATIVE for significant cough or SOB  CV: NEGATIVE for chest pain, palpitations or peripheral edema  GI: NEGATIVE for nausea, abdominal pain, heartburn, or change in bowel habits  : NEGATIVE for frequency, dysuria, or hematuria  MUSCULOSKELETAL:see hpi  NEURO: numbness into feet.   ENDOCRINE: NEGATIVE for temperature intolerance, skin/hair changes  HEME: NEGATIVE for bleeding problems  PSYCHIATRIC: NEGATIVE for changes in mood or affect    EXAM:   /58 (BP Location: Left arm, Patient Position: Sitting, Cuff Size: Adult Large)   Pulse 76   Temp 98.8  F (37.1  C) (Tympanic)   Ht 1.803 m (5' 11\")   Wt 93 kg (205 lb)   SpO2 98%   BMI 28.59 kg/m      GENERAL APPEARANCE: healthy, alert and no distress     EYES: EOMI,  PERRL     HENT: ear canals and TM's normal and nose and mouth without ulcers or lesions     NECK: no adenopathy, no asymmetry, masses, or scars and thyroid normal to palpation     RESP: lungs clear to auscultation - no rales, rhonchi or wheezes     CV: regular rates and rhythm, normal S1 S2, no S3 or S4 and no murmur, click or rub     ABDOMEN:  soft, nontender, no HSM or masses and bowel sounds normal     MS: walking on crutches and in extreme pain. His left  leg is in an immobilizer.      SKIN: no suspicious lesions or rashes     NEURO: Normal strength and tone, sensory exam grossly normal, mentation intact and speech normal     PSYCH: mentation appears normal. and affect normal/bright     LYMPHATICS: No cervical adenopathy    DIAGNOSTICS:     Labs Resulted Today:   Results for orders placed or performed in visit on 03/27/19   XR KNEE LEFT G/E 4 VIEWS (Clinic Performed)    Narrative    PROCEDURE:  XR KNEE LT G/E 4 VW    HISTORY: " Left knee pain    COMPARISON:  None.    TECHNIQUE:  4 views of the left knee were obtained.    FINDINGS:  No fracture or dislocation is identified. The joint spaces  are preserved.  There is a left knee effusion.      Impression    IMPRESSION: Left knee effusion      KAMALJIT MARTEL MD       Recent Labs   Lab Test 11/09/17  1545 12/22/15  1828   HGB 13.9 14.5    249   NA  --  141   POTASSIUM  --  4.0   CR  --  0.99        IMPRESSION:   Reason for surgery/procedure: left knee meniscal tear.   Diagnosis/reason for consult: medical clearance.     The proposed surgical procedure is considered INTERMEDIATE risk.    REVISED CARDIAC RISK INDEX  The patient has the following serious cardiovascular risks for perioperative complications such as (MI, PE, VFib and 3  AV Block):  No serious cardiac risks  INTERPRETATION: 1 risks: Class II (low risk - 0.9% complication rate)    The patient has the following additional risks for perioperative complications:  No identified additional risks      ICD-10-CM    1. Preop general physical exam Z01.818      Hx Schizophrenia not been on medication for many years.  We are giving 18 lortab for pain today. He is going to limit himself for mostly night time taking t his. He will let us know if any trouble or difficulty with his mood or changing sensorium.   RECOMMENDATIONS:     --Patient is to take all scheduled medications on the day of surgery EXCEPT for modifications listed below.  --Patient is on no chronic medications    APPROVAL GIVEN to proceed with proposed procedure, without further diagnostic evaluation       Signed Electronically by: DIONICIO Keenan    Copy of this evaluation report is provided to requesting physician.    [facility] Preop Guidelines    Revised Cardiac Risk Index

## 2019-03-29 ENCOUNTER — ANESTHESIA EVENT (OUTPATIENT)
Dept: SURGERY | Facility: HOSPITAL | Age: 29
End: 2019-03-29
Payer: COMMERCIAL

## 2019-03-29 NOTE — ANESTHESIA PREPROCEDURE EVALUATION
Anesthesia Pre-Procedure Evaluation    Patient: Eddi Trejo   MRN: [id number] : 1990          Preoperative Diagnosis: LATERAL MENISCAL TEAR    Procedure(s):  LEFT KNEE ARTHROSCOPY    Past Medical History:   Diagnosis Date     Bronchitis 02     Headache 4/28/10     Open wound of forehead 4/28/10     Otitis 02     Schizophrenia (H) 4/28/10     URI (upper respiratory infection) 95     Past Surgical History:   Procedure Laterality Date     ARTHROSCOPY KNEE Left 11/10/2017    Procedure: ARTHROSCOPY KNEE;  Left Knee Arthroscopy with Menical Repair;  Surgeon: Jp Pritchard DO;  Location: [facility] OR     [facility] CREATE EARDRUM OPENING,GEN ANESTH      PE tubes as child       Anesthesia Evaluation     . Pt has had prior anesthetic. Type: General    No history of anesthetic complications          ROS/MED HX    ENT/Pulmonary: Comment: Hx bronchitis  Chewing tobacco  Otitis    (+)LUX risk factors snores loudly, Current use , . .    Neurologic: Comment: headaches - neg neurologic ROS     Cardiovascular:  - neg cardiovascular ROS   (+) ----. : . . . :. . No previous cardiac testing       METS/Exercise Tolerance:  >4 METS   Hematologic:  - neg hematologic  ROS       Musculoskeletal:  - neg musculoskeletal ROS       GI/Hepatic:  - neg GI/hepatic ROS       Renal/Genitourinary:  - ROS Renal section negative       Endo:  - neg endo ROS       Psychiatric:     (+) psychiatric history schizophrenia      Infectious Disease:  - neg infectious disease ROS       Malignancy:      - no malignancy   Other:    - neg other ROS               H&P on 3/28/19       Physical Exam  Normal systems: cardiovascular and pulmonary    Airway   Mallampati: II  TM distance: >3 FB  Neck ROM: full    Dental   (+) missing and chipped    Cardiovascular   Rhythm and rate: regular and normal      Pulmonary    breath sounds clear to auscultation            Lab Results   Component Value Date    WBC 8.1 2019    HGB 14.2 2019    HCT  "41.3 03/28/2019     03/28/2019     12/22/2015    POTASSIUM 4.0 12/22/2015    CHLORIDE 106 12/22/2015    CO2 31 12/22/2015    BUN 15 12/22/2015    CR 0.99 12/22/2015    GLC 90 12/22/2015    AISHWARYA 8.3 (L) 12/22/2015    ALBUMIN 4.1 12/22/2015    PROTTOTAL 7.1 12/22/2015    ALT 51 12/22/2015    AST 30 12/22/2015    ALKPHOS 33 (L) 12/22/2015    BILITOTAL 0.4 12/22/2015    LIPASE 87 12/22/2015       Preop Vitals  BP Readings from Last 3 Encounters:   03/28/19 106/58   03/27/19 120/62   03/23/19 131/84    Pulse Readings from Last 3 Encounters:   03/28/19 76   03/27/19 67   03/23/19 72      Resp Readings from Last 3 Encounters:   03/23/19 16   12/02/18 18   08/17/18 16    SpO2 Readings from Last 3 Encounters:   03/28/19 98%   03/27/19 97%   03/23/19 98%      Temp Readings from Last 1 Encounters:   03/28/19 98.8  F (37.1  C) (Tympanic)    Ht Readings from Last 1 Encounters:   03/28/19 1.803 m (5' 11\")      Wt Readings from Last 1 Encounters:   03/28/19 93 kg (205 lb)    Estimated body mass index is 28.59 kg/m  as calculated from the following:    Height as of 3/28/19: 1.803 m (5' 11\").    Weight as of 3/28/19: 93 kg (205 lb).       Anesthesia Plan      History & Physical Review  History and physical reviewed and following examination; no interval change.    ASA Status:  2 .    NPO Status:  > 8 hours    Plan for General and LMA with Intravenous and Propofol induction. Maintenance will be Balanced.    PONV prophylaxis:  Ondansetron (or other 5HT-3) and Dexamethasone or Solumedrol  Discussed risks and benefits with patient for general anesthesia including sore throat, nausea, vomiting, aspiration, dental damage, loss of airway, CV complications, stroke, MI, death. Pt wishes to proceed.       Postoperative Care  Postoperative pain management:  IV analgesics.      Consents  Anesthetic plan, risks, benefits and alternatives discussed with:  Patient.  Use of blood products discussed: Yes.   Use of blood products " discussed with Patient.  Consented to blood products.  .                 KELLY Villalta CRNA

## 2019-04-01 ENCOUNTER — ANESTHESIA (OUTPATIENT)
Dept: SURGERY | Facility: HOSPITAL | Age: 29
End: 2019-04-01
Payer: COMMERCIAL

## 2019-04-01 ENCOUNTER — HOSPITAL ENCOUNTER (OUTPATIENT)
Facility: HOSPITAL | Age: 29
Discharge: HOME | End: 2019-04-01
Attending: ORTHOPAEDIC SURGERY | Admitting: ORTHOPAEDIC SURGERY
Payer: COMMERCIAL

## 2019-04-01 VITALS
HEIGHT: 71 IN | BODY MASS INDEX: 28.7 KG/M2 | WEIGHT: 205 LBS | OXYGEN SATURATION: 98 % | RESPIRATION RATE: 18 BRPM | TEMPERATURE: 98.3 F | SYSTOLIC BLOOD PRESSURE: 142 MMHG | DIASTOLIC BLOOD PRESSURE: 92 MMHG

## 2019-04-01 DIAGNOSIS — Z98.890 H/O ARTHROSCOPIC KNEE SURGERY: Primary | ICD-10-CM

## 2019-04-01 PROCEDURE — 25000128 H RX IP 250 OP 636: Performed by: NURSE ANESTHETIST, CERTIFIED REGISTERED

## 2019-04-01 PROCEDURE — 25000128 H RX IP 250 OP 636: Performed by: ANESTHESIOLOGY

## 2019-04-01 PROCEDURE — 37000009 ZZH ANESTHESIA TECHNICAL FEE, EACH ADDTL 15 MIN: Performed by: ORTHOPAEDIC SURGERY

## 2019-04-01 PROCEDURE — 71000014 ZZH RECOVERY PHASE 1 LEVEL 2 FIRST HR: Performed by: ORTHOPAEDIC SURGERY

## 2019-04-01 PROCEDURE — 25000131 ZZH RX MED GY IP 250 OP 636 PS 637: Performed by: PHYSICIAN ASSISTANT

## 2019-04-01 PROCEDURE — 29881 ARTHRS KNE SRG MNISECTMY M/L: CPT | Mod: LT | Performed by: ORTHOPAEDIC SURGERY

## 2019-04-01 PROCEDURE — 36000056 ZZH SURGERY LEVEL 3 1ST 30 MIN: Performed by: ORTHOPAEDIC SURGERY

## 2019-04-01 PROCEDURE — 29881 ARTHRS KNE SRG MNISECTMY M/L: CPT | Performed by: ANESTHESIOLOGY

## 2019-04-01 PROCEDURE — 25000566 ZZH SEVOFLURANE, EA 15 MIN: Performed by: ANESTHESIOLOGY

## 2019-04-01 PROCEDURE — 25000566 ZZH SEVOFLURANE, EA 15 MIN: Performed by: NURSE ANESTHETIST, CERTIFIED REGISTERED

## 2019-04-01 PROCEDURE — 25000128 H RX IP 250 OP 636: Performed by: ORTHOPAEDIC SURGERY

## 2019-04-01 PROCEDURE — 25000132 ZZH RX MED GY IP 250 OP 250 PS 637: Performed by: PHYSICIAN ASSISTANT

## 2019-04-01 PROCEDURE — 71000027 ZZH RECOVERY PHASE 2 EACH 15 MINS: Performed by: ORTHOPAEDIC SURGERY

## 2019-04-01 PROCEDURE — 25000125 ZZHC RX 250: Performed by: NURSE ANESTHETIST, CERTIFIED REGISTERED

## 2019-04-01 PROCEDURE — 40000305 ZZH STATISTIC PRE PROC ASSESS I: Performed by: ORTHOPAEDIC SURGERY

## 2019-04-01 PROCEDURE — 25000125 ZZHC RX 250: Performed by: ANESTHESIOLOGY

## 2019-04-01 PROCEDURE — 01999 UNLISTED ANES PROCEDURE: CPT | Performed by: NURSE ANESTHETIST, CERTIFIED REGISTERED

## 2019-04-01 PROCEDURE — 25800030 ZZH RX IP 258 OP 636: Performed by: NURSE ANESTHETIST, CERTIFIED REGISTERED

## 2019-04-01 PROCEDURE — 36000058 ZZH SURGERY LEVEL 3 EA 15 ADDTL MIN: Performed by: ORTHOPAEDIC SURGERY

## 2019-04-01 PROCEDURE — 25000125 ZZHC RX 250: Performed by: ORTHOPAEDIC SURGERY

## 2019-04-01 PROCEDURE — 27210794 ZZH OR GENERAL SUPPLY STERILE: Performed by: ORTHOPAEDIC SURGERY

## 2019-04-01 PROCEDURE — 37000008 ZZH ANESTHESIA TECHNICAL FEE, 1ST 30 MIN: Performed by: ORTHOPAEDIC SURGERY

## 2019-04-01 RX ORDER — ALBUTEROL SULFATE 0.83 MG/ML
2.5 SOLUTION RESPIRATORY (INHALATION) EVERY 4 HOURS PRN
Status: DISCONTINUED | OUTPATIENT
Start: 2019-04-01 | End: 2019-04-01 | Stop reason: HOSPADM

## 2019-04-01 RX ORDER — SCOLOPAMINE TRANSDERMAL SYSTEM 1 MG/1
1 PATCH, EXTENDED RELEASE TRANSDERMAL ONCE
Status: COMPLETED | OUTPATIENT
Start: 2019-04-01 | End: 2019-04-01

## 2019-04-01 RX ORDER — PROPOFOL 10 MG/ML
INJECTION, EMULSION INTRAVENOUS PRN
Status: DISCONTINUED | OUTPATIENT
Start: 2019-04-01 | End: 2019-04-01

## 2019-04-01 RX ORDER — FENTANYL CITRATE 50 UG/ML
25-50 INJECTION, SOLUTION INTRAMUSCULAR; INTRAVENOUS
Status: DISCONTINUED | OUTPATIENT
Start: 2019-04-01 | End: 2019-04-01 | Stop reason: HOSPADM

## 2019-04-01 RX ORDER — LIDOCAINE 40 MG/G
CREAM TOPICAL
Status: DISCONTINUED | OUTPATIENT
Start: 2019-04-01 | End: 2019-04-01 | Stop reason: HOSPADM

## 2019-04-01 RX ORDER — ONDANSETRON 4 MG/1
4 TABLET, ORALLY DISINTEGRATING ORAL
Status: COMPLETED | OUTPATIENT
Start: 2019-04-01 | End: 2019-04-01

## 2019-04-01 RX ORDER — ONDANSETRON 2 MG/ML
INJECTION INTRAMUSCULAR; INTRAVENOUS PRN
Status: DISCONTINUED | OUTPATIENT
Start: 2019-04-01 | End: 2019-04-01

## 2019-04-01 RX ORDER — HYDROCODONE BITARTRATE AND ACETAMINOPHEN 5; 325 MG/1; MG/1
1 TABLET ORAL EVERY 6 HOURS PRN
Qty: 16 TABLET | Refills: 0 | Status: SHIPPED | OUTPATIENT
Start: 2019-04-01 | End: 2019-04-16

## 2019-04-01 RX ORDER — HYDRALAZINE HYDROCHLORIDE 20 MG/ML
2.5-5 INJECTION INTRAMUSCULAR; INTRAVENOUS EVERY 10 MIN PRN
Status: DISCONTINUED | OUTPATIENT
Start: 2019-04-01 | End: 2019-04-01 | Stop reason: HOSPADM

## 2019-04-01 RX ORDER — HYDROCODONE BITARTRATE AND ACETAMINOPHEN 5; 325 MG/1; MG/1
1 TABLET ORAL
Status: COMPLETED | OUTPATIENT
Start: 2019-04-01 | End: 2019-04-01

## 2019-04-01 RX ORDER — METOPROLOL TARTRATE 1 MG/ML
1-2 INJECTION, SOLUTION INTRAVENOUS EVERY 5 MIN PRN
Status: DISCONTINUED | OUTPATIENT
Start: 2019-04-01 | End: 2019-04-01 | Stop reason: HOSPADM

## 2019-04-01 RX ORDER — SODIUM CHLORIDE, SODIUM LACTATE, POTASSIUM CHLORIDE, CALCIUM CHLORIDE 600; 310; 30; 20 MG/100ML; MG/100ML; MG/100ML; MG/100ML
INJECTION, SOLUTION INTRAVENOUS CONTINUOUS
Status: DISCONTINUED | OUTPATIENT
Start: 2019-04-01 | End: 2019-04-01 | Stop reason: HOSPADM

## 2019-04-01 RX ORDER — LIDOCAINE HYDROCHLORIDE 20 MG/ML
INJECTION, SOLUTION INFILTRATION; PERINEURAL PRN
Status: DISCONTINUED | OUTPATIENT
Start: 2019-04-01 | End: 2019-04-01

## 2019-04-01 RX ORDER — DEXAMETHASONE SODIUM PHOSPHATE 10 MG/ML
INJECTION, SOLUTION INTRAMUSCULAR; INTRAVENOUS PRN
Status: DISCONTINUED | OUTPATIENT
Start: 2019-04-01 | End: 2019-04-01

## 2019-04-01 RX ORDER — NALOXONE HYDROCHLORIDE 0.4 MG/ML
.1-.4 INJECTION, SOLUTION INTRAMUSCULAR; INTRAVENOUS; SUBCUTANEOUS
Status: DISCONTINUED | OUTPATIENT
Start: 2019-04-01 | End: 2019-04-01 | Stop reason: HOSPADM

## 2019-04-01 RX ORDER — MEPERIDINE HYDROCHLORIDE 50 MG/ML
12.5 INJECTION INTRAMUSCULAR; INTRAVENOUS; SUBCUTANEOUS
Status: DISCONTINUED | OUTPATIENT
Start: 2019-04-01 | End: 2019-04-01 | Stop reason: HOSPADM

## 2019-04-01 RX ORDER — ONDANSETRON 4 MG/1
4 TABLET, ORALLY DISINTEGRATING ORAL EVERY 30 MIN PRN
Status: DISCONTINUED | OUTPATIENT
Start: 2019-04-01 | End: 2019-04-01 | Stop reason: HOSPADM

## 2019-04-01 RX ORDER — LIDOCAINE HYDROCHLORIDE AND EPINEPHRINE 10; 10 MG/ML; UG/ML
INJECTION, SOLUTION INFILTRATION; PERINEURAL PRN
Status: DISCONTINUED | OUTPATIENT
Start: 2019-04-01 | End: 2019-04-01 | Stop reason: HOSPADM

## 2019-04-01 RX ORDER — KETOROLAC TROMETHAMINE 30 MG/ML
INJECTION, SOLUTION INTRAMUSCULAR; INTRAVENOUS PRN
Status: DISCONTINUED | OUTPATIENT
Start: 2019-04-01 | End: 2019-04-01

## 2019-04-01 RX ORDER — FENTANYL CITRATE 50 UG/ML
INJECTION, SOLUTION INTRAMUSCULAR; INTRAVENOUS PRN
Status: DISCONTINUED | OUTPATIENT
Start: 2019-04-01 | End: 2019-04-01

## 2019-04-01 RX ORDER — ONDANSETRON 2 MG/ML
4 INJECTION INTRAMUSCULAR; INTRAVENOUS EVERY 30 MIN PRN
Status: DISCONTINUED | OUTPATIENT
Start: 2019-04-01 | End: 2019-04-01 | Stop reason: HOSPADM

## 2019-04-01 RX ORDER — ONDANSETRON 4 MG/1
4 TABLET, ORALLY DISINTEGRATING ORAL EVERY 8 HOURS PRN
Qty: 12 TABLET | Refills: 0 | Status: SHIPPED | OUTPATIENT
Start: 2019-04-01 | End: 2019-04-16

## 2019-04-01 RX ORDER — HYDROMORPHONE HYDROCHLORIDE 1 MG/ML
.3-.5 INJECTION, SOLUTION INTRAMUSCULAR; INTRAVENOUS; SUBCUTANEOUS EVERY 10 MIN PRN
Status: DISCONTINUED | OUTPATIENT
Start: 2019-04-01 | End: 2019-04-01 | Stop reason: HOSPADM

## 2019-04-01 RX ORDER — ACETAMINOPHEN 325 MG/1
975 TABLET ORAL
Status: DISCONTINUED | OUTPATIENT
Start: 2019-04-01 | End: 2019-04-01 | Stop reason: HOSPADM

## 2019-04-01 RX ORDER — KETOROLAC TROMETHAMINE 30 MG/ML
30 INJECTION, SOLUTION INTRAMUSCULAR; INTRAVENOUS EVERY 6 HOURS PRN
Status: DISCONTINUED | OUTPATIENT
Start: 2019-04-01 | End: 2019-04-01 | Stop reason: HOSPADM

## 2019-04-01 RX ORDER — DEXAMETHASONE SODIUM PHOSPHATE 4 MG/ML
4 INJECTION, SOLUTION INTRA-ARTICULAR; INTRALESIONAL; INTRAMUSCULAR; INTRAVENOUS; SOFT TISSUE EVERY 10 MIN PRN
Status: DISCONTINUED | OUTPATIENT
Start: 2019-04-01 | End: 2019-04-01 | Stop reason: HOSPADM

## 2019-04-01 RX ORDER — OXYCODONE HYDROCHLORIDE 5 MG/1
5 TABLET ORAL EVERY 4 HOURS PRN
Status: DISCONTINUED | OUTPATIENT
Start: 2019-04-01 | End: 2019-04-01 | Stop reason: HOSPADM

## 2019-04-01 RX ADMIN — ONDANSETRON 4 MG: 2 INJECTION INTRAMUSCULAR; INTRAVENOUS at 08:24

## 2019-04-01 RX ADMIN — SODIUM CHLORIDE, POTASSIUM CHLORIDE, SODIUM LACTATE AND CALCIUM CHLORIDE: 600; 310; 30; 20 INJECTION, SOLUTION INTRAVENOUS at 07:47

## 2019-04-01 RX ADMIN — LIDOCAINE HYDROCHLORIDE 40 MG: 20 INJECTION, SOLUTION INFILTRATION; PERINEURAL at 08:27

## 2019-04-01 RX ADMIN — FENTANYL CITRATE 100 MCG: 50 INJECTION, SOLUTION INTRAMUSCULAR; INTRAVENOUS at 08:24

## 2019-04-01 RX ADMIN — KETOROLAC TROMETHAMINE 30 MG: 30 INJECTION, SOLUTION INTRAMUSCULAR at 08:36

## 2019-04-01 RX ADMIN — PROPOFOL 200 MG: 10 INJECTION, EMULSION INTRAVENOUS at 08:27

## 2019-04-01 RX ADMIN — FENTANYL CITRATE 50 MCG: 50 INJECTION, SOLUTION INTRAMUSCULAR; INTRAVENOUS at 09:45

## 2019-04-01 RX ADMIN — FENTANYL CITRATE 50 MCG: 50 INJECTION, SOLUTION INTRAMUSCULAR; INTRAVENOUS at 10:44

## 2019-04-01 RX ADMIN — SCOPOLAMINE 1 PATCH: 1 PATCH, EXTENDED RELEASE TRANSDERMAL at 08:21

## 2019-04-01 RX ADMIN — FENTANYL CITRATE 50 MCG: 50 INJECTION, SOLUTION INTRAMUSCULAR; INTRAVENOUS at 09:57

## 2019-04-01 RX ADMIN — Medication 0.5 MG: at 10:10

## 2019-04-01 RX ADMIN — FENTANYL CITRATE 50 MCG: 50 INJECTION, SOLUTION INTRAMUSCULAR; INTRAVENOUS at 10:06

## 2019-04-01 RX ADMIN — DEXAMETHASONE SODIUM PHOSPHATE 10 MG: 10 INJECTION, SOLUTION INTRAMUSCULAR; INTRAVENOUS at 08:35

## 2019-04-01 RX ADMIN — MIDAZOLAM 2 MG: 1 INJECTION INTRAMUSCULAR; INTRAVENOUS at 08:24

## 2019-04-01 RX ADMIN — FENTANYL CITRATE 50 MCG: 50 INJECTION, SOLUTION INTRAMUSCULAR; INTRAVENOUS at 09:52

## 2019-04-01 RX ADMIN — HYDROCODONE BITARTRATE AND ACETAMINOPHEN 1 TABLET: 5; 325 TABLET ORAL at 10:45

## 2019-04-01 RX ADMIN — ONDANSETRON 4 MG: 4 TABLET, ORALLY DISINTEGRATING ORAL at 10:49

## 2019-04-01 ASSESSMENT — MIFFLIN-ST. JEOR: SCORE: 1912

## 2019-04-01 NOTE — DISCHARGE INSTRUCTIONS
Remove the scopolamine patch behind your LEFT ear after 24 hours after application.   After removing the patch, wash your hands and the area behind your ear thoroughly with soap and water.   The patch will still contain some medicine after use.   To avoid accidental contact or ingestion by children or pets, fold the used patch in half with the sticky side together and throw away in the trash out of the reach of children and pets.         Post-Anesthesia Patient Instructions    IMMEDIATELY FOLLOWING SURGERY:  Do not drive or operate machinery for the first twenty four hours after surgery.  Do not make any important decisions for twenty four hours after surgery or while taking narcotic pain medications or sedatives.  If you develop intractable nausea and vomiting or a severe headache please notify your doctor immediately.    FOLLOW-UP:  Please make an appointment with your surgeon as instructed. You do not need to follow up with anesthesia unless specifically instructed to do so.    WOUND CARE INSTRUCTIONS (if applicable):  Keep a dry clean dressing on the anesthesia/puncture wound site if there is drainage.  Once the wound has quit draining you may leave it open to air.  Generally you should leave the bandage intact for twenty four hours unless there is drainage.  If the epidural site drains for more than 36-48 hours please call the anesthesia department.    QUESTIONS?:  Please feel free to call your physician or the hospital  if you have any questions, and they will be happy to assist you.   POST OPERATIVE PATIENT INFORMATION   Knee Arthroscopy    DIET  No restrictions. Drink plenty of fluids. If you become nauseated, stay as quiet as possible and try fluids such as tea, soup, or lemon-lime pop.     DISCOMFORT  You may experience some discomfort. To reduce discomfort, try the following:    * Keep leg elevated on pillows as much as possible for 2-3 days to reduce  swelling (swelling increases pain.)   * Apply  "an ice pack to your knee for 3 days after surgery to help reduce swelling,  and periodically for the first 1-2 weeks post operatively.   * Take aspirin, Ascriptin, Tylenol or the prescription you may have received.     ACTIVITY  It is not unusual to feel tired and weak the day after surgery. Resting quietly is recommended, keeping the operative leg elevated. You may pout weight on your operative leg as tolerated using the crutches for balance, unless otherwise instructed. Perform the knee exercises as instructed on the bottom of this sheet.     CARE OF OPERATIVE SITE  Keep the elastic wrap in place to keep the bandage dry with these exceptions:   * REMOVE ACE BANDAGE AND DRESSING DAILY, STARTING THE  SECOND DAY FOLLOWING SURGERY.   * YOU MAY TAKE A SHOWER AFTER DRESSINGS ARE REMOVED ON DAY 3. (NO  TUB BATHS OR HOT TUBS)   * COVER INCISIONS WITH BAND-AIDS   * REAPPLY ACE WRAP    IMPORTANT OBSERVATIONS  Check C-M-S of operative leg every 4 hours while you are awake for the first 48 hours:    * C: color - should appear normal/pink   * M: motion - able to move foot, wiggle toes   * S: sensation - able to \"feel\": no numbness, tingling  If you experience changes in C-M-S and/or in severe pain, you may remove the elastic bandages and reapply ot - tight enough to provide support for the gauze dressing but loose enough to improve C-M-S. The gauze dressing should NOT be removed when rewrapping the elastic bandage.     KNEE EXERCISES - See Below  Start leg exercises two days post-op    These exercises help maintain muscle tone in you leg and strengthen the muscles supporting the knee. Do them a minimum of three times a day; ten times each. If you have questions about the exercises or problems doing them please contact your doctor.     Quad Sets. Lying on your back, press the back side of your knee down against the bed, tightening the muscles on the of your thigh. Hold for a count of five. Relax and repeat.     Hamstring " Isometrics. Push your heel into the bed as if attempting to bend your knee. You should feel the muscles on the back of your thigh tighten. Hold for a count of five. Relax and repeat.     CONTACT YOUR DOCTOR...if you have any problems, such as:    * Fever of 100 degrees or higher ( a fever below 100 degrees may occur as a  normal response to the surgical procedure)   * Changes in C-M-S and/or pain, unrelieved by pain medication or re-wrapping  the elastic bandage   * Pain or tenderness in the calf of either leg      Knee Exercises  -Knee Exercises may begin 3-5 days post-operatively       Quadriceps Sets    Lie on your back in bed, legs straight.    Tighten the muscle at the front of the thigh as you press the back of your knee down toward the bed. Hold for 5 to 10 seconds. Then relax the leg.      Straight Leg Raises    Lie in bed. Bend one leg. Keep your other leg straight on the bed.    Lift your straight leg as high as you         comfortably can, but not higher than        12 inches. Hold for 5 seconds. Then         slowly lower the leg.         Heel slides:      Lie down or sit with your legs stretched out in front of you. Put a plastic bag or cookie sheet under one foot to help it slide.    Slide the heel toward your buttocks while keeping it on the bed. Move it as far as you comfortably can. Hold for a few seconds, then slide your heel back.          8934-1177 [employer]. [address] [place] [address]. All rights reserved. This information is not intended as a substitute for professional medical care. Always follow your healthcare professional's instructions.    Elevate operative extremity above your heart a lot over the next several days  Remove Dressing Postoperative day #3 and apply band-aids daily thereafter.  OK to shower after the dressing is removed on day #3  Weightbearing as tolerated  Crutches for the first 5 days   Knee Range of Motion exercises as tolerated starting  tomorrow  Follow-up in about 10-15 days in clinic. Call if any problems.    Give LABELED intra-operative pictures to patient   Give Dr Moralez's  Knee Arthroscopy Information sheet to patient

## 2019-04-01 NOTE — OR NURSING
Eating, drinking, and tolerating well. Pain down to 2/10 at discharge. Discharge instructions discussed with Pt and Jose Luis schumacher. IV removed. Dressed with assistance from Jose Luis. Escorted to vehicle at ER entrance via wheelchair. Bryce 19.

## 2019-04-01 NOTE — ANESTHESIA POSTPROCEDURE EVALUATION
Patient: Eddi Trejo    Procedure(s):  LEFT KNEE ARTHROSCOPY    Diagnosis:LATERAL MENISCAL TEAR  Diagnosis Additional Information: No value filed.    Anesthesia Type:  General, LMA    Note:  Anesthesia Post Evaluation    Patient location during evaluation: PACU, Phase 2 and Bedside  Patient participation: Able to fully participate in evaluation  Level of consciousness: awake and alert  Pain management: adequate  Airway patency: patent  Cardiovascular status: acceptable  Respiratory status: acceptable  Hydration status: stable  PONV: none     Anesthetic complications: None          Last vitals:  Vitals:    04/01/19 1045 04/01/19 1050 04/01/19 1055   BP: 124/95 (!) 129/111 142/92   Resp: 16 16 18   Temp:   98.3  F (36.8  C)   SpO2: 97%  98%         Electronically Signed By: Barrera Jung MD  April 1, 2019  11:49 AM

## 2019-04-01 NOTE — ANESTHESIA CARE TRANSFER NOTE
Patient: Eddi Trejo    Procedure(s):  LEFT KNEE ARTHROSCOPY    Diagnosis: LATERAL MENISCAL TEAR  Diagnosis Additional Information: No value filed.    Anesthesia Type:   General, LMA     Note:  Airway :Nasal Cannula  Patient transferred to:PACU  Handoff Report: Identifed the Patient, Identified the Reponsible Provider, Reviewed the pertinent medical history, Discussed the surgical course, Reviewed Intra-OP anesthesia mangement and issues during anesthesia, Set expectations for post-procedure period and Allowed opportunity for questions and acknowledgement of understanding      Vitals: (Last set prior to Anesthesia Care Transfer)    CRNA VITALS  4/1/2019 0907 - 4/1/2019 0939      4/1/2019             Pulse:  77    SpO2:  99 %    Resp Rate (observed):  24    Resp Rate (set):  8                Electronically Signed By: KELLY Leyva CRNA  April 1, 2019  9:39 AM

## 2019-04-01 NOTE — OP NOTE
Preoperative Diagnosis: Lateral meniscus tear left knee    Postoperative Diagnosis: Same    Procedure: Arthroscopic partial lateral meniscectomy    Surgeon: Noel Moralez MD    Assistant: Jennifer Lazo PA-C    Anesthesia: General    Estimated Blood Loss: Minimal    Total Tourniquet Time: None    Complications: None    Specimen: None    Perioperative Antibiotics: None        Operative Description: After the satisfactory induction of a general anesthetic, with the patient in the supine position, the left knee was sterilely prepped and draped.     Arthroscopic Findings:      Synovium: Normal throughout the knee      Patella: Centrally located over the trochlear groove      Loose bodies: None      Anterior cruciate ligament: Intact      Degenerative joint disease:      Medial meniscus: Intact      Lateral meniscus: Large displaced bucket-handle tear of the posterior horn extending into the popliteal hiatus.    Arthroscopic Intervention: Using appropriate instrumentation including manual instruments, a shaver, and an ablator, the entire posterior horn of the lateral meniscus was excised.  The resection margin was tapered through the body of the meniscus.  The remaining anterior horn and the portion of the body of the meniscus remaining were thoroughly probed to document their stability.    Closure: The knee was then copiously irrigated to remove all debris.  30 mL of Carbocaine were placed in the knee for postoperative analgesia.  All instrumentation was removed from the knee and both portals were closed with nylon sutures.  Xeroform, Band-Aids and Ace wrap are applied.  The patient was uneventfully awakened and left the OR in stable condition having tolerated the procedure well.  There were no complications.  The estimated blood loss was negligible.

## 2019-04-16 ENCOUNTER — OFFICE VISIT (OUTPATIENT)
Dept: ORTHOPEDICS | Facility: OTHER | Age: 29
End: 2019-04-16
Attending: PHYSICIAN ASSISTANT
Payer: COMMERCIAL

## 2019-04-16 VITALS
SYSTOLIC BLOOD PRESSURE: 114 MMHG | BODY MASS INDEX: 28.7 KG/M2 | HEART RATE: 68 BPM | DIASTOLIC BLOOD PRESSURE: 80 MMHG | HEIGHT: 71 IN | TEMPERATURE: 98.1 F | OXYGEN SATURATION: 97 % | WEIGHT: 205 LBS

## 2019-04-16 DIAGNOSIS — Z98.890 H/O ARTHROSCOPIC KNEE SURGERY: Primary | ICD-10-CM

## 2019-04-16 PROCEDURE — 99024 POSTOP FOLLOW-UP VISIT: CPT | Performed by: PHYSICIAN ASSISTANT

## 2019-04-16 RX ORDER — HYDROCODONE BITARTRATE AND ACETAMINOPHEN 5; 325 MG/1; MG/1
1 TABLET ORAL EVERY 6 HOURS PRN
Qty: 10 TABLET | Refills: 0 | Status: SHIPPED | OUTPATIENT
Start: 2019-04-16 | End: 2019-09-01

## 2019-04-16 ASSESSMENT — MIFFLIN-ST. JEOR: SCORE: 1912

## 2019-04-16 ASSESSMENT — PAIN SCALES - GENERAL: PAINLEVEL: MODERATE PAIN (4)

## 2019-04-16 NOTE — NURSING NOTE
"Chief Complaint   Patient presents with     Surgical Followup     Left Knee       Initial /80   Pulse 68   Temp 98.1  F (36.7  C) (Tympanic)   Ht 1.803 m (5' 11\")   Wt 93 kg (205 lb)   SpO2 97%   BMI 28.59 kg/m   Estimated body mass index is 28.59 kg/m  as calculated from the following:    Height as of this encounter: 1.803 m (5' 11\").    Weight as of this encounter: 93 kg (205 lb).  Medication Reconciliation: complete    Elizabeth Bartholomew LPN  "

## 2019-04-16 NOTE — PROGRESS NOTES
"Chief Complaint: Post-operative visit, left knee arthroscopy 4/1/19    HPI: Patient Profile: 29-year-old, right-handed , tobacco chewer, patient of Dr. 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  A similar event happened on 7/6/2019 in his home.  He presented to the ED today after each event, and followed up in this office on 7/19/2017 when an MRI was ordered.  The study,  performed on 7/20/2017, showed a bucket-handle tear of the lateral meniscus.  He underwent an arthroscopic repair of the meniscus by Dr. Pritchard on 11/10/2017.  His knee did well until 3/23/2019 when, as he got out of bed, he pivoted on his left leg and felt and heard a pop from the left knee.  His left knee has been extremely painful ever since.  He presented to the ER on the day of the injury.  He was placed in a knee immobilizer and prescribed Toradol.  He was put on crutches.    He presented to us on 3/27/19, based off his symptomatology (lock knee), we did not order and MRI and patient consented and elected to surgical intervention for his left lateral meniscal tear on 4/1/19.     Patient underwent the above procedure on 4/1/19 (AP[facility]).     Subjective: Patient is doing well overall. He has been off crutches since day 2 post op. He has been taking his Norco as prescribed, and solely at nighttime and is weaning himself off. His pain overall is well-controlled, icing 2-3 times per day and doing his post-op home exercises. He denies N/T/B into the LLE. He denies SOB, signs of infection and/or calf tenderness. He is eager about returning to work (patient is a ) and is curious when he may do so.    There have been no other changes in regards to his MSK or neurological ROS.     Objective: /80   Pulse 68   Temp 98.1  F (36.7  C) (Tympanic)   Ht 1.803 m (5' 11\")   Wt 93 kg (205 lb)   SpO2 97%  "  BMI 28.59 kg/m    Overweight, pleasant 29-year old male in no acute distress. Respiratory efforts non-labored, no calf tenderness. Incision/portal sites on the left knee are clean, dry and free of signs of infection. Sutures were removed in the office today, there are no signs of erythema or ecchymosis. Mild tenderness to palpation over the lateral joint line. ROM is 0-110 degrees. Knee is stable to ligamentous stressing including: Drawer, Lachman, Varus and Valgus stressing. Patient requires no ambulatory device. Neurovascular status intact, posterior tibial and dorsalis pedis pulses are 2+.     Impression: 2-weeks status post left knee arthroscopy     Plan: Patient to be off work for an additional 2-weeks. I gave him an updated work status and work letter to reflect this. I refilled his Hermitage 5-235mg #10 for nighttime use, I reviewed safe storage and medication usage with the patient. He will follow up in 2-weeks for re-evaluation with hopeful return to work, we will update his work status at his next visit as well. All questions and concerns addressed and answered to patient's satisfaction.

## 2019-04-16 NOTE — LETTER
[facility]  [address]  [place] [address]  Phone: [phone]    April 16, 2019        Eddi Trejo  [address]  [place] [address]          To whom it may concern:    RE: Eddi Trejo    Patient was seen and treated today at our clinic. He underwent a left knee arthroscopic surgery on 4/1/19, he has been off work for 2 weeks, and please allow him to be excused for missing work for today's visit 4/16/19. He will follow up with the clinic in 2-weeks (end of April) for re-evaluation with hopeful return to work, I will give him an updated work status today and at next visit.     Please contact me for questions or concerns.      Sincerely,        Jennifer Raines PA-C

## 2019-04-27 ENCOUNTER — HOSPITAL ENCOUNTER (EMERGENCY)
Facility: HOSPITAL | Age: 29
Discharge: HOME | End: 2019-04-27
Attending: PHYSICIAN ASSISTANT | Admitting: PHYSICIAN ASSISTANT
Payer: COMMERCIAL

## 2019-04-27 VITALS
DIASTOLIC BLOOD PRESSURE: 99 MMHG | TEMPERATURE: 97.1 F | OXYGEN SATURATION: 99 % | HEART RATE: 61 BPM | SYSTOLIC BLOOD PRESSURE: 146 MMHG | RESPIRATION RATE: 20 BRPM

## 2019-04-27 DIAGNOSIS — K08.89 TOOTHACHE: Primary | ICD-10-CM

## 2019-04-27 PROCEDURE — 99213 OFFICE O/P EST LOW 20 MIN: CPT | Mod: 24 | Performed by: PHYSICIAN ASSISTANT

## 2019-04-27 PROCEDURE — G0463 HOSPITAL OUTPT CLINIC VISIT: HCPCS

## 2019-04-27 RX ORDER — CHLORHEXIDINE GLUCONATE ORAL RINSE 1.2 MG/ML
15 SOLUTION DENTAL 2 TIMES DAILY
Qty: 118 ML | Refills: 0 | Status: SHIPPED | OUTPATIENT
Start: 2019-04-27 | End: 2019-09-01

## 2019-04-27 RX ORDER — CEFDINIR 300 MG/1
300 CAPSULE ORAL 2 TIMES DAILY
Qty: 20 CAPSULE | Refills: 0 | Status: SHIPPED | OUTPATIENT
Start: 2019-04-27 | End: 2019-09-01

## 2019-04-27 ASSESSMENT — ENCOUNTER SYMPTOMS
SORE THROAT: 0
FEVER: 0

## 2019-04-27 NOTE — ED AVS SNAPSHOT
[facility]  [address]  [place] [address]  Phone:  [phone]                                    Eddi Trejo   MRN: [id number]    Department:  [facility]   Date of Visit:  4/27/2019           After Visit Summary Signature Page    I have received my discharge instructions, and my questions have been answered. I have discussed any challenges I see with this plan with the nurse or doctor.    ..........................................................................................................................................  Patient/Patient Representative Signature      ..........................................................................................................................................  Patient Representative Print Name and Relationship to Patient    ..................................................               ................................................  Date                                   Time    ..........................................................................................................................................  Reviewed by Signature/Title    ...................................................              ..............................................  Date                                               Time          22EPIC Rev 08/18

## 2019-04-28 ENCOUNTER — HOSPITAL ENCOUNTER (EMERGENCY)
Facility: HOSPITAL | Age: 29
Discharge: HOME | End: 2019-04-28
Attending: PHYSICIAN ASSISTANT | Admitting: PHYSICIAN ASSISTANT
Payer: COMMERCIAL

## 2019-04-28 VITALS
SYSTOLIC BLOOD PRESSURE: 146 MMHG | OXYGEN SATURATION: 100 % | TEMPERATURE: 98.9 F | RESPIRATION RATE: 18 BRPM | DIASTOLIC BLOOD PRESSURE: 99 MMHG

## 2019-04-28 DIAGNOSIS — K08.89 TOOTHACHE: ICD-10-CM

## 2019-04-28 PROCEDURE — 99213 OFFICE O/P EST LOW 20 MIN: CPT | Mod: 24 | Performed by: PHYSICIAN ASSISTANT

## 2019-04-28 PROCEDURE — G0463 HOSPITAL OUTPT CLINIC VISIT: HCPCS

## 2019-04-28 RX ORDER — IBUPROFEN 800 MG/1
800 TABLET, FILM COATED ORAL EVERY 8 HOURS PRN
Qty: 60 TABLET | Refills: 0 | Status: SHIPPED | OUTPATIENT
Start: 2019-04-28 | End: 2019-09-01

## 2019-04-28 RX ORDER — ACETAMINOPHEN 500 MG
500 TABLET ORAL EVERY 4 HOURS PRN
Qty: 60 TABLET | Refills: 0 | COMMUNITY
Start: 2019-04-28 | End: 2021-08-12

## 2019-04-28 ASSESSMENT — ENCOUNTER SYMPTOMS
FACIAL SWELLING: 0
SORE THROAT: 0
FEVER: 0
RHINORRHEA: 0
NAUSEA: 0
VOMITING: 0

## 2019-04-28 NOTE — ED PROVIDER NOTES
"  History     Chief Complaint   Patient presents with     Dental Pain     top right. states he was seen yesterday and given a dental block and \"it did not work.\" also states abx \"didn't work.\" pt has taken 2 doses of abx. drinking mtn dew in triage.      HPI  Eddi Trejo is a 29 year old male who presents to urgent care for reevaluation of dental pain.  Patient was seen last night by myself in urgent care for the same complaint.  Patient states that his dental block wore off after about 2 hours.  Patient was unable to start antibiotic treatments until 8:00 this morning.  Patient states that he is still having severe pain.  Patient states that he would like a prescription for ibuprofen because he cannot afford it as he is not working currently.  Patient continues to have pain over tooth #4 the upper right aspect of mouth.  Patient denies any new symptoms, fevers, bad taste, or any other associated symptoms.    Allergies:  Allergies   Allergen Reactions     Penicillins      Patient denies difficulty breathing with reaction. He reports a mild reaction and has previously tolerated keflex without difficulty.        Problem List:    There are no active problems to display for this patient.       Past Medical History:    Past Medical History:   Diagnosis Date     Bronchitis 4/25/02     Headache 4/28/10     Open wound of forehead 4/28/10     Otitis 4/18/02     Schizophrenia (H) 4/28/10     URI (upper respiratory infection) 12/24/95       Past Surgical History:    Past Surgical History:   Procedure Laterality Date     ARTHROSCOPY KNEE Left 11/10/2017    Procedure: ARTHROSCOPY KNEE;  Left Knee Arthroscopy with Menical Repair;  Surgeon: Jp Pritchard DO;  Location: [facility]     ARTHROSCOPY KNEE Left 4/1/2019    Procedure: LEFT KNEE ARTHROSCOPY;  Surgeon: Noel Moralez MD;  Location: [facility]      CREATE EARDRUM OPENING,GEN ANESTH      PE tubes as child       Family History:    Family History   Problem Relation " Age of Onset     Asthma Mother      Colon Cancer Maternal Grandfather          at 58     Breast Cancer Maternal Grandmother      Diabetes Paternal Grandmother      Diabetes Paternal Aunt      Diabetes Maternal Aunt      C.A.D. No family hx of      Hypertension No family hx of        Social History:  Marital Status:  Single [1]  Social History     Tobacco Use     Smoking status: Never Smoker     Smokeless tobacco: Current User     Types: Chew     Tobacco comment: pt denied quit plan 19   Substance Use Topics     Alcohol use: No     Alcohol/week: 0.0 oz     Drug use: No        Medications:      acetaminophen (TYLENOL) 500 MG tablet   benzocaine (ORAJEL/ANBESOL) 10 % gel   cefdinir (OMNICEF) 300 MG capsule   chlorhexidine (PERIDEX) 0.12 % solution   ibuprofen (ADVIL/MOTRIN) 800 MG tablet         Review of Systems   Constitutional: Negative for fever.   HENT: Positive for dental problem. Negative for ear pain, facial swelling, rhinorrhea and sore throat.    Gastrointestinal: Negative for nausea and vomiting.       Physical Exam   BP: 146/99  Heart Rate: 64  Temp: 98.9  F (37.2  C)  Resp: 18  SpO2: 100 %      Physical Exam   Constitutional: He is oriented to person, place, and time. He appears well-developed and well-nourished. No distress.   HENT:   Mouth/Throat: Oropharynx is clear and moist. Abnormal dentition. Dental caries present. No dental abscesses or uvula swelling. No oropharyngeal exudate or posterior oropharyngeal erythema.       Cardiovascular: Normal rate, regular rhythm and normal heart sounds.   Pulmonary/Chest: Effort normal and breath sounds normal. No respiratory distress.   Neurological: He is alert and oriented to person, place, and time.   Nursing note and vitals reviewed.      ED Course        Procedures              Critical Care time:               No results found for this or any previous visit (from the past 24 hour(s)).    Medications - No data to display    Assessments & Plan (with  Medical Decision Making)   #1.  Dental pain    Discussed exam findings with patient.  Patient declines dental block at this time.  Patient was prescribed ibuprofen, Tylenol, benzocaine gel.  Patient is to continue taking antibiotic and Peridex mouthwash as previously prescribed.  Patient should follow-up with dentist as soon as possible.  All questions were asked and answered.  Patient verbalizes understanding and agreement.        I have reviewed the nursing notes.    I have reviewed the findings, diagnosis, plan and need for follow up with the patient.         Medication List      Started    benzocaine 10 % gel  Commonly known as:  ORAJEL/ANBESOL  Mouth/Throat, 4 TIMES DAILY PRN     ibuprofen 800 MG tablet  Commonly known as:  ADVIL/MOTRIN  800 mg, Oral, EVERY 8 HOURS PRN        Modified    acetaminophen 500 MG tablet  Commonly known as:  TYLENOL  500 mg, Oral, EVERY 4 HOURS PRN  What changed:      medication strength    how much to take    when to take this    reasons to take this            Final diagnoses:   Toothache       4/28/2019   [facility]     Thomas Almaraz PA-C  04/28/19 [id number]

## 2019-04-28 NOTE — ED PROVIDER NOTES
History     Chief Complaint   Patient presents with     Dental Pain     Right upper mouth area, onset yesterday. States he has dental appointment to have the tooth pulled next month.     HPI  Eddi Trejo is a 29 year old male who presents to urgent care for evaluation.  Patient states that yesterday 1 of his right upper molars started causing him pain.  He states that he has a long-standing history of poor dentition and dental issues.  He states that next month he is scheduled to have all his remainder teeth removed.  Patient denies any fevers, bad taste in his mouth, sore throat, nausea, vomiting, or any other associated symptoms at this time.  Patient has been taking Tylenol and which has not helped his pain.    Allergies:  Allergies   Allergen Reactions     Penicillins      Patient denies difficulty breathing with reaction. He reports a mild reaction and has previously tolerated keflex without difficulty.        Problem List:    There are no active problems to display for this patient.       Past Medical History:    Past Medical History:   Diagnosis Date     Bronchitis 02     Headache 4/28/10     Open wound of forehead 4/28/10     Otitis 02     Schizophrenia (H) 4/28/10     URI (upper respiratory infection) 95       Past Surgical History:    Past Surgical History:   Procedure Laterality Date     ARTHROSCOPY KNEE Left 11/10/2017    Procedure: ARTHROSCOPY KNEE;  Left Knee Arthroscopy with Menical Repair;  Surgeon: Jp Pritchard DO;  Location: [facility]     ARTHROSCOPY KNEE Left 2019    Procedure: LEFT KNEE ARTHROSCOPY;  Surgeon: Noel Moralez MD;  Location: [facility]      CREATE EARDRUM OPENING,GEN ANESTH      PE tubes as child       Family History:    Family History   Problem Relation Age of Onset     Asthma Mother      Colon Cancer Maternal Grandfather          at 58     Breast Cancer Maternal Grandmother      Diabetes Paternal Grandmother      Diabetes Paternal Aunt       Diabetes Maternal Aunt      C.A.D. No family hx of      Hypertension No family hx of        Social History:  Marital Status:  Single [1]  Social History     Tobacco Use     Smoking status: Never Smoker     Smokeless tobacco: Current User     Types: Chew     Tobacco comment: pt denied quit plan 4/16/19   Substance Use Topics     Alcohol use: No     Alcohol/week: 0.0 oz     Drug use: No        Medications:      cefdinir (OMNICEF) 300 MG capsule   chlorhexidine (PERIDEX) 0.12 % solution   Acetaminophen (TYLENOL PO)         Review of Systems   Constitutional: Negative for fever.   HENT: Positive for dental problem. Negative for sore throat.        Physical Exam   BP: 146/99  Pulse: 61  Temp: 97.1  F (36.2  C)  Resp: 20  SpO2: 99 %      Physical Exam   Constitutional: He is oriented to person, place, and time. He appears well-developed and well-nourished. He appears distressed.   HENT:   Head: Normocephalic.   Mouth/Throat: Abnormal dentition. Dental caries present. No dental abscesses.       Patient has pain with palpation over tooth #4.  There is no obvious gingival swelling or abscess.  Tooth #4 is cracked in half and patient has dental putty applied to broken off area of tooth.   Eyes: Pupils are equal, round, and reactive to light.   Cardiovascular: Normal rate, regular rhythm and normal heart sounds.   Pulmonary/Chest: Effort normal and breath sounds normal. No respiratory distress.   Neurological: He is alert and oriented to person, place, and time.   Nursing note and vitals reviewed.      ED Course        Procedures              Critical Care time:               No results found for this or any previous visit (from the past 24 hour(s)).    Medications - No data to display    Assessments & Plan (with Medical Decision Making)   #1.  Dental pain    Discussed exam findings with patient.  Patient had supraperiosteal infiltration performed with 3 cc of bupivacaine over tooth #4.  Patient tolerated this well and noted  resolution of pain.  Patient was prescribed Cefdinir 300 mg twice daily for 10 days along with Peridex mouthwash.  I recommend patient keep appointment with dentist this coming month.  Any further concerns in the meantime please return to urgent care.  Patient verbalizes understanding and agreement of plan.            I have reviewed the nursing notes.    I have reviewed the findings, diagnosis, plan and need for follow up with the patient.         Medication List      Started    cefdinir 300 MG capsule  Commonly known as:  OMNICEF  300 mg, Oral, 2 TIMES DAILY     chlorhexidine 0.12 % solution  Commonly known as:  PERIDEX  15 mLs, Swish & Spit, 2 TIMES DAILY            Final diagnoses:   Toothache       4/27/2019   [facility]     Thomas Almaraz PA-C  04/27/19 [id number]

## 2019-04-28 NOTE — ED TRIAGE NOTES
Pt presents today with significant other for c/o dental pain was seen yesterday here for the same, has only used the swish and spit once and has not taken anything OTC for the pain.

## 2019-04-28 NOTE — DISCHARGE INSTRUCTIONS
Apply topical benzocaine gel 4 times a day as needed for pain  Take Tylenol 500 mg every 4 hours and rotate with ibuprofen 800 mg every 8 hours for pain.  Continue taking Cefdinir and Peridex mouthwash as previously prescribed.  Follow-up with dentist ASAP

## 2019-04-28 NOTE — ED AVS SNAPSHOT
[facility]  [address]  [place] [address]  Phone:  [phone]                                    Eddi Trejo   MRN: [id number]    Department:  [facility]   Date of Visit:  4/28/2019           After Visit Summary Signature Page    I have received my discharge instructions, and my questions have been answered. I have discussed any challenges I see with this plan with the nurse or doctor.    ..........................................................................................................................................  Patient/Patient Representative Signature      ..........................................................................................................................................  Patient Representative Print Name and Relationship to Patient    ..................................................               ................................................  Date                                   Time    ..........................................................................................................................................  Reviewed by Signature/Title    ...................................................              ..............................................  Date                                               Time          22EPIC Rev 08/18

## 2019-06-13 ENCOUNTER — HOSPITAL ENCOUNTER (EMERGENCY)
Facility: HOSPITAL | Age: 29
Discharge: HOME | End: 2019-06-13
Attending: FAMILY MEDICINE | Admitting: FAMILY MEDICINE
Payer: COMMERCIAL

## 2019-06-13 VITALS — OXYGEN SATURATION: 99 % | TEMPERATURE: 97.9 F | RESPIRATION RATE: 16 BRPM

## 2019-06-13 DIAGNOSIS — H16.131: Primary | ICD-10-CM

## 2019-06-13 PROCEDURE — 25000125 ZZHC RX 250: Performed by: FAMILY MEDICINE

## 2019-06-13 PROCEDURE — 99283 EMERGENCY DEPT VISIT LOW MDM: CPT | Mod: Z6 | Performed by: FAMILY MEDICINE

## 2019-06-13 PROCEDURE — 99283 EMERGENCY DEPT VISIT LOW MDM: CPT

## 2019-06-13 RX ORDER — OXYCODONE HYDROCHLORIDE 5 MG/1
5 TABLET ORAL EVERY 6 HOURS PRN
Qty: 10 TABLET | Refills: 0 | Status: SHIPPED | OUTPATIENT
Start: 2019-06-13 | End: 2019-09-01

## 2019-06-13 RX ORDER — SOFT LENS RINSE,STORE SOLUTION
1 SOLUTION, NON-ORAL MISCELLANEOUS ONCE
Status: COMPLETED | OUTPATIENT
Start: 2019-06-13 | End: 2019-06-13

## 2019-06-13 RX ORDER — TETRACAINE HYDROCHLORIDE 5 MG/ML
1-2 SOLUTION OPHTHALMIC ONCE
Status: COMPLETED | OUTPATIENT
Start: 2019-06-13 | End: 2019-06-13

## 2019-06-13 RX ADMIN — TETRACAINE HYDROCHLORIDE 2 DROP: 5 SOLUTION OPHTHALMIC at 09:35

## 2019-06-13 RX ADMIN — WATER 1 ML: 99.05 SOLUTION OPHTHALMIC at 09:35

## 2019-06-13 ASSESSMENT — ENCOUNTER SYMPTOMS
COLOR CHANGE: 0
HEADACHES: 0
CONFUSION: 0
FEVER: 0
ABDOMINAL PAIN: 0
ARTHRALGIAS: 0
NECK STIFFNESS: 0
DIFFICULTY URINATING: 0
EYE REDNESS: 0
SHORTNESS OF BREATH: 0

## 2019-06-13 NOTE — LETTER
June 13, 2019      To Whom It May Concern:      Eddi Trejo was seen in our Emergency Department today, 06/13/19.  I expect his condition to improve over the next 2 - 3 days.  He may return to work on 6/14/19.    Sincerely,        Onur Coates MD

## 2019-06-13 NOTE — ED PROVIDER NOTES
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 He removed his contact at 0300, but notes continued pain and clear drainage. His vision seems normal when he wipes the drainage from his right eye.    Allergies:  Allergies   Allergen Reactions     Penicillins      Patient denies difficulty breathing with reaction. He reports a mild reaction and has previously tolerated keflex without difficulty.        Problem List:    There are no active problems to display for this patient.       Past Medical History:    Past Medical History:   Diagnosis Date     Bronchitis 02     Headache 4/28/10     Open wound of forehead 4/28/10     Otitis 02     Schizophrenia (H) 4/28/10     URI (upper respiratory infection) 95       Past Surgical History:    Past Surgical History:   Procedure Laterality Date     ARTHROSCOPY KNEE Left 11/10/2017    Procedure: ARTHROSCOPY KNEE;  Left Knee Arthroscopy with Menical Repair;  Surgeon: Jp Pritchard DO;  Location: [facility]     ARTHROSCOPY KNEE Left 2019    Procedure: LEFT KNEE ARTHROSCOPY;  Surgeon: Noel Moralez MD;  Location: [facility]      CREATE EARDRUM OPENING,GEN ANESTH      PE tubes as child       Family History:    Family History   Problem Relation Age of Onset     Asthma Mother      Colon Cancer Maternal Grandfather          at 58     Breast Cancer Maternal Grandmother      Diabetes Paternal Grandmother      Diabetes Paternal Aunt      Diabetes Maternal Aunt      C.A.D. No family hx of      Hypertension No family hx of        Social History:  Marital Status:  Single [1]  Social History     Tobacco Use     Smoking status: Never Smoker     Smokeless tobacco: Current  User     Types: Chew     Tobacco comment: pt denied quit plan 4/16/19   Substance Use Topics     Alcohol use: No     Alcohol/week: 0.0 oz     Drug use: No        Medications:      acetaminophen (TYLENOL) 500 MG tablet   benzocaine (ORAJEL/ANBESOL) 10 % gel   chlorhexidine (PERIDEX) 0.12 % solution   oxyCODONE (ROXICODONE) 5 MG tablet         Review of Systems   Constitutional: Negative for fever.   HENT: Negative for congestion.    Eyes: Negative for redness.   Respiratory: Negative for shortness of breath.    Cardiovascular: Negative for chest pain.   Gastrointestinal: Negative for abdominal pain.   Genitourinary: Negative for difficulty urinating.   Musculoskeletal: Negative for arthralgias and neck stiffness.   Skin: Negative for color change.   Neurological: Negative for headaches.   Psychiatric/Behavioral: Negative for confusion.       Physical Exam   Heart Rate: 65  Temp: 97.9  F (36.6  C)  Resp: 16  SpO2: 99 %      Physical Exam    General: awake, alert, sitting comfortably    Head: atraumatic.    Ears: no drainage, external ears normal.    Eyes: orbits, eyelids, left conjunctiva and sclera are normal. Mild, diffuse injection of right conjunctiva and sclera. Diffuse spickling of sclera on fluorescein testing. Pupils are equal, round, reactive to light & accomodation with extraocular movements intact. Vision is grossly intact and funduscopic examination is unremarkable.    Nose: no discharge or congestion.    Mouth/Throat: moist mucous membranes.    Neck: supple, full & painless ROM.    Lungs: no retractions or acute respiratory distress. No tachypnea.    Musculoskeletal/Extremities: full & painless ROM, no lower extremity edema or gross abnormality.    Skin: warm, dry, no cyanosis or rash.    Psych: alert & oriented x 3, fluid, logical thought & speech.    ED Course        Procedures                 No results found for this or any previous visit (from the past 24 hour(s)).    Medications   tetracaine  (PONTOCAINE) 0.5 % ophthalmic solution 1-2 drop (has no administration in time range)   eye wash (BSS PLUS) ophthalmic solution 1 mL (has no administration in time range)       Assessments & Plan (with Medical Decision Making)   The patient is a 29 year old man with photokeratitis of the right eye.    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    Plan for PCP follow-up in 5 - 7 days regarding this ER visit. The patient verbalizes agreement with this plan as well as to immediately return to the ER with any new/worsening S/Sx.      I have reviewed the nursing notes.    I have reviewed the findings, diagnosis, plan and need for follow up with the patient.       Medication List      Started    oxyCODONE 5 MG tablet  Commonly known as:  ROXICODONE  5 mg, Oral, EVERY 6 HOURS PRN            Final diagnoses:   Photokeratitis of right eye       6/13/2019   [facility]     Onur Coates MD  06/13/19 0951

## 2019-06-13 NOTE — ED NOTES
"\"Here for right eye being red and hurting since 0300, has a little bit of blurred vision, my eye contact is out. \"  "

## 2019-06-13 NOTE — ED AVS SNAPSHOT
[facility]  [address]  [place] [address]  Phone:  [phone]                                    Eddi Trejo   MRN: [id number]    Department:  [facility]   Date of Visit:  6/13/2019           After Visit Summary Signature Page    I have received my discharge instructions, and my questions have been answered. I have discussed any challenges I see with this plan with the nurse or doctor.    ..........................................................................................................................................  Patient/Patient Representative Signature      ..........................................................................................................................................  Patient Representative Print Name and Relationship to Patient    ..................................................               ................................................  Date                                   Time    ..........................................................................................................................................  Reviewed by Signature/Title    ...................................................              ..............................................  Date                                               Time          22EPIC Rev 08/18

## 2019-06-13 NOTE — ED NOTES
Discharge instructions gone over with patient and he verbalizes understanding.  Discharged home with friend.  rx for oxycodone given to patient to fill at pharmacy of choice.   Pain 4/10 and is tolerable.

## 2019-08-20 ENCOUNTER — HOSPITAL ENCOUNTER (EMERGENCY)
Facility: HOSPITAL | Age: 29
Discharge: HOME | End: 2019-08-20
Attending: FAMILY MEDICINE | Admitting: FAMILY MEDICINE
Payer: OTHER MISCELLANEOUS

## 2019-08-20 VITALS
OXYGEN SATURATION: 100 % | DIASTOLIC BLOOD PRESSURE: 95 MMHG | TEMPERATURE: 97.8 F | RESPIRATION RATE: 18 BRPM | SYSTOLIC BLOOD PRESSURE: 125 MMHG | HEART RATE: 80 BPM

## 2019-08-20 DIAGNOSIS — T22.411A: ICD-10-CM

## 2019-08-20 DIAGNOSIS — T26.90XA CHEMICAL BURN OF EYE: Primary | ICD-10-CM

## 2019-08-20 DIAGNOSIS — T22.412A: ICD-10-CM

## 2019-08-20 PROCEDURE — 25000132 ZZH RX MED GY IP 250 OP 250 PS 637

## 2019-08-20 PROCEDURE — 99283 EMERGENCY DEPT VISIT LOW MDM: CPT | Mod: Z6 | Performed by: FAMILY MEDICINE

## 2019-08-20 PROCEDURE — 25000125 ZZHC RX 250

## 2019-08-20 PROCEDURE — 99283 EMERGENCY DEPT VISIT LOW MDM: CPT

## 2019-08-20 RX ORDER — TETRACAINE HYDROCHLORIDE 5 MG/ML
SOLUTION OPHTHALMIC
Status: COMPLETED
Start: 2019-08-20 | End: 2019-08-20

## 2019-08-20 RX ORDER — IBUPROFEN 600 MG/1
TABLET, FILM COATED ORAL
Status: COMPLETED
Start: 2019-08-20 | End: 2019-08-20

## 2019-08-20 RX ORDER — TETRACAINE HYDROCHLORIDE 5 MG/ML
1-2 SOLUTION OPHTHALMIC ONCE
Status: DISCONTINUED | OUTPATIENT
Start: 2019-08-20 | End: 2019-08-20

## 2019-08-20 RX ADMIN — TETRACAINE HYDROCHLORIDE 2 DROP: 5 SOLUTION OPHTHALMIC at 08:59

## 2019-08-20 RX ADMIN — IBUPROFEN: 600 TABLET ORAL at 09:04

## 2019-08-20 ASSESSMENT — ENCOUNTER SYMPTOMS
NECK STIFFNESS: 0
SHORTNESS OF BREATH: 0
COLOR CHANGE: 0
ABDOMINAL PAIN: 0
CONFUSION: 0
DIFFICULTY URINATING: 0
WHEEZING: 0
HEADACHES: 0
PHOTOPHOBIA: 1
ARTHRALGIAS: 0
FEVER: 0
EYE REDNESS: 1
EYE DISCHARGE: 1
EYE PAIN: 1

## 2019-08-20 NOTE — ED PROVIDER NOTES
History     Chief Complaint   Patient presents with     Eye Injury     HPI  Eddi Trejo is a 29 year old man who was working in [place], [place] at 0400 this morning when he had High Calcium Quicklime fall into his right eye and onto his arms. The patient was wearing his safety glasses, but looked above him to remove a loose brick when lime fell from the brick and behind his safety glasses and into his eye and on his arms. He informed his supervisor, washed his eye and arms for about 45 minutes. He was not encouraged to seek medical care at that time and drove home. However, his eyes have continued to burn. He thinks that his right contact lens is still in the eye, but he can't get it out. He cannot see clearly from the right eye and thinks this is due to continuous tearing. His arms no longer burn after washing this morning.    Allergies:  Allergies   Allergen Reactions     Penicillins      Patient denies difficulty breathing with reaction. He reports a mild reaction and has previously tolerated keflex without difficulty.        Problem List:    There are no active problems to display for this patient.       Past Medical History:    Past Medical History:   Diagnosis Date     Bronchitis 4/25/02     Headache 4/28/10     Open wound of forehead 4/28/10     Otitis 4/18/02     Schizophrenia (H) 4/28/10     URI (upper respiratory infection) 12/24/95       Past Surgical History:    Past Surgical History:   Procedure Laterality Date     ARTHROSCOPY KNEE Left 11/10/2017    Procedure: ARTHROSCOPY KNEE;  Left Knee Arthroscopy with Menical Repair;  Surgeon: Jp Pritchard DO;  Location: [facility]     ARTHROSCOPY KNEE Left 4/1/2019    Procedure: LEFT KNEE ARTHROSCOPY;  Surgeon: Noel Moralez MD;  Location: [facility]      CREATE EARDRUM OPENING,GEN ANESTH      PE tubes as child       Family History:    Family History   Problem Relation Age of Onset     Asthma Mother      Colon Cancer Maternal Grandfather           at 58     Breast Cancer Maternal Grandmother      Diabetes Paternal Grandmother      Diabetes Paternal Aunt      Diabetes Maternal Aunt      C.A.D. No family hx of      Hypertension No family hx of        Social History:  Marital Status:  Single [1]  Social History     Tobacco Use     Smoking status: Never Smoker     Smokeless tobacco: Current User     Types: Chew     Tobacco comment: pt denied quit plan 19   Substance Use Topics     Alcohol use: No     Alcohol/week: 0.0 oz     Drug use: No        Medications:      acetaminophen (TYLENOL) 500 MG tablet   benzocaine (ORAJEL/ANBESOL) 10 % gel   chlorhexidine (PERIDEX) 0.12 % solution   oxyCODONE (ROXICODONE) 5 MG tablet         Review of Systems   Constitutional: Negative for fever.   HENT: Negative for congestion.    Eyes: Positive for photophobia, pain, discharge, redness and visual disturbance.   Respiratory: Negative for shortness of breath and wheezing.    Cardiovascular: Negative for chest pain.   Gastrointestinal: Negative for abdominal pain.   Genitourinary: Negative for difficulty urinating.   Musculoskeletal: Negative for arthralgias and neck stiffness.   Skin: Negative for color change.   Neurological: Negative for headaches.   Psychiatric/Behavioral: Negative for confusion.       Physical Exam   BP: 125/95  Pulse: 80  Temp: 97.8  F (36.6  C)  Resp: 18  SpO2: 100 %      Physical Exam    General: awake, alert, sitting comfortably    Head: atraumatic.    Ears: no drainage, external ears normal.    Eyes: diffusely erythematous left globe with inflammation of the conjunctivae and sclera. Hemorrhages on surface of left orbit. Pupils are equal, round. Extraocular movements intact. Tetracaine allows closer examination of left globe, but it is unclear if there is a partially dissolved contact lens in the patient's eye or diffuse inflammation without mounding of the sclera around the pupil and iris. Despite tetracaine, the patient is unable to tolerate  funduscopic due to severe photophobia.    Nose: no discharge or congestion.    Mouth/Throat: moist mucous membranes.    Neck: supple, full & painless ROM.    Lungs: no retractions or acute respiratory distress. No tachypnea.    Musculoskeletal/Extremities: erythematous splotches of the bilateral forearms without blistering. Patient notes that arms are not painful with palpation.    Skin: warm, dry, no cyanosis or rash.    Psych: alert & oriented x 3, fluid, logical thought & speech.    ED Course        Procedures           No results found for this or any previous visit (from the past 24 hour(s)).    Medications   tetracaine (PONTOCAINE) 0.5 % ophthalmic solution (has no administration in time range)   ibuprofen (ADVIL/MOTRIN) 600 MG tablet (  Given 8/20/19 0904)       Assessments & Plan (with Medical Decision Making)   The patient is a 29 year old man with a lime exposure to his right eye and bilateral arms.    1) Lime exposure - PCC contacted after MSDS confirms chemical patient was exposed to was High Calcium Quicklime. PCC recommends copious irrigation with water. Unclear if contact lens is still in the eye/partially dissolved, but further evaluation is required. MD immediately contacts [facility] who will have the clinic ophthalmologist see the patient immediately. Patient's bilateral arms are erythematous without blistering, but no longer burn following washing this morning. Patient will be immediately transferred to the [facility] for further evaluation and treatment.      I have reviewed the nursing notes.    I have reviewed the findings, diagnosis, plan and need for follow up with the patient.    New Prescriptions    No medications on file       Final diagnoses:   Chemical burn of eye   Chemical burn of left forearm, initial encounter   Chemical burn of right forearm, initial encounter       8/20/2019   [facility]     Onur Coates MD  08/20/19 5780

## 2019-08-20 NOTE — ED NOTES
"To room 6, had also gotten lime on danny arms, had washed danny arms with Aggie dishwashing soap in the shower in the Decontamination room.  \"No burning of arms present.\" No redness of arms noted.   "

## 2019-08-20 NOTE — ED TRIAGE NOTES
Poison control contacted and recommend getting MSDS and flushing eyes.   Patient has been flushing 45 minutes PTA.   Called and spoke with employer [employer]. Naman CLEMENTS Spoke with Kell at [phone] and she will be faxing MSDS.   Incident occurred at 0430 this am.

## 2019-08-20 NOTE — DISCHARGE INSTRUCTIONS
You will be taken directly to the [facility] for immediate evaluation by the clinic ophthalmologist.

## 2019-08-20 NOTE — ED NOTES
Spoke with Ceferino from poison control, he states we need to continue flushing the eye and may use petroleum jelly to remove the contact lens.

## 2019-09-01 ENCOUNTER — HOSPITAL ENCOUNTER (EMERGENCY)
Facility: HOSPITAL | Age: 29
Discharge: HOME | End: 2019-09-02
Attending: INTERNAL MEDICINE | Admitting: INTERNAL MEDICINE
Payer: COMMERCIAL

## 2019-09-01 ENCOUNTER — APPOINTMENT (OUTPATIENT)
Dept: GENERAL RADIOLOGY | Facility: HOSPITAL | Age: 29
End: 2019-09-01
Attending: INTERNAL MEDICINE
Payer: COMMERCIAL

## 2019-09-01 VITALS
HEART RATE: 64 BPM | TEMPERATURE: 98.1 F | DIASTOLIC BLOOD PRESSURE: 94 MMHG | BODY MASS INDEX: 28 KG/M2 | SYSTOLIC BLOOD PRESSURE: 142 MMHG | RESPIRATION RATE: 20 BRPM | HEIGHT: 71 IN | WEIGHT: 200 LBS | OXYGEN SATURATION: 98 %

## 2019-09-01 DIAGNOSIS — S61.411A LACERATION OF RIGHT HAND WITHOUT FOREIGN BODY, INITIAL ENCOUNTER: ICD-10-CM

## 2019-09-01 PROCEDURE — 73130 X-RAY EXAM OF HAND: CPT | Mod: TC,RT

## 2019-09-01 PROCEDURE — 99283 EMERGENCY DEPT VISIT LOW MDM: CPT

## 2019-09-01 PROCEDURE — 99283 EMERGENCY DEPT VISIT LOW MDM: CPT | Mod: Z6 | Performed by: INTERNAL MEDICINE

## 2019-09-01 ASSESSMENT — ENCOUNTER SYMPTOMS
ARTHRALGIAS: 0
CONFUSION: 0
EYE REDNESS: 0
SHORTNESS OF BREATH: 0
NECK STIFFNESS: 0
HEADACHES: 0
FOCAL WEAKNESS: 0
COLOR CHANGE: 0
ABDOMINAL PAIN: 0
FEVER: 0
DIFFICULTY URINATING: 0

## 2019-09-01 ASSESSMENT — MIFFLIN-ST. JEOR: SCORE: 1889.32

## 2019-09-01 NOTE — ED AVS SNAPSHOT
[facility]  [address]  [place] [address]  Phone:  [phone]                                    Eddi Trejo   MRN: [id number]    Department:  [facility]   Date of Visit:  9/1/2019           After Visit Summary Signature Page    I have received my discharge instructions, and my questions have been answered. I have discussed any challenges I see with this plan with the nurse or doctor.    ..........................................................................................................................................  Patient/Patient Representative Signature      ..........................................................................................................................................  Patient Representative Print Name and Relationship to Patient    ..................................................               ................................................  Date                                   Time    ..........................................................................................................................................  Reviewed by Signature/Title    ...................................................              ..............................................  Date                                               Time          22EPIC Rev 08/18

## 2019-09-02 PROCEDURE — 25000132 ZZH RX MED GY IP 250 OP 250 PS 637: Performed by: INTERNAL MEDICINE

## 2019-09-02 RX ORDER — NAPROXEN 500 MG/1
500 TABLET ORAL ONCE
Status: COMPLETED | OUTPATIENT
Start: 2019-09-02 | End: 2019-09-02

## 2019-09-02 RX ADMIN — NAPROXEN 500 MG: 500 TABLET ORAL at 00:37

## 2019-09-02 NOTE — ED PROVIDER NOTES
History     Chief Complaint   Patient presents with     Laceration     Rt hand     The history is provided by the patient.   Laceration   Location:  Hand  Hand laceration location:  R hand  Depth:  Cutaneous  Quality: straight    Bleeding: controlled with pressure    Laceration mechanism:  Blunt object  Pain details:     Quality:  Aching  Foreign body present:  No foreign bodies  Tetanus status:  Up to date  Associated symptoms: swelling    Associated symptoms: no fever and no focal weakness      Eddi Trejo is a 29 year old male who     Allergies:  Allergies   Allergen Reactions     Penicillins      Patient denies difficulty breathing with reaction. He reports a mild reaction and has previously tolerated keflex without difficulty.        Problem List:    There are no active problems to display for this patient.       Past Medical History:    Past Medical History:   Diagnosis Date     Bronchitis 02     Headache 4/28/10     Open wound of forehead 4/28/10     Otitis 02     Schizophrenia (H) 4/28/10     URI (upper respiratory infection) 95       Past Surgical History:    Past Surgical History:   Procedure Laterality Date     ARTHROSCOPY KNEE Left 11/10/2017    Procedure: ARTHROSCOPY KNEE;  Left Knee Arthroscopy with Menical Repair;  Surgeon: Jp Pritchard DO;  Location: [facility]     ARTHROSCOPY KNEE Left 2019    Procedure: LEFT KNEE ARTHROSCOPY;  Surgeon: Noel Moralez MD;  Location: [facility]     HC CREATE EARDRUM OPENING,GEN ANESTH      PE tubes as child       Family History:    Family History   Problem Relation Age of Onset     Asthma Mother      Colon Cancer Maternal Grandfather          at 58     Breast Cancer Maternal Grandmother      Diabetes Paternal Grandmother      Diabetes Paternal Aunt      Diabetes Maternal Aunt      C.A.D. No family hx of      Hypertension No family hx of        Social History:  Marital Status:  Single [1]  Social History     Tobacco Use     Smoking  "status: Never Smoker     Smokeless tobacco: Current User     Types: Chew     Tobacco comment: pt denied quit plan 4/16/19   Substance Use Topics     Alcohol use: No     Alcohol/week: 0.0 oz     Drug use: No        Medications:      acetaminophen (TYLENOL) 500 MG tablet         Review of Systems   Constitutional: Negative for fever.   HENT: Negative for congestion.    Eyes: Negative for redness.   Respiratory: Negative for shortness of breath.    Cardiovascular: Negative for chest pain.   Gastrointestinal: Negative for abdominal pain.   Genitourinary: Negative for difficulty urinating.   Musculoskeletal: Negative for arthralgias and neck stiffness.   Skin: Negative for color change.   Neurological: Negative for focal weakness and headaches.   Psychiatric/Behavioral: Negative for confusion.   All other systems reviewed and are negative.      Physical Exam   BP: 142/94  Pulse: 64  Temp: 98.1  F (36.7  C)  Resp: 20  Height: 180.3 cm (5' 11\")  Weight: 90.7 kg (200 lb)  SpO2: 98 %      Physical Exam   Constitutional: No distress.   HENT:   Head: Atraumatic.   Mouth/Throat: Oropharynx is clear and moist.   Eyes: Pupils are equal, round, and reactive to light. No scleral icterus.   Cardiovascular: Normal heart sounds and intact distal pulses.   Pulmonary/Chest: Breath sounds normal. No respiratory distress.   Abdominal: Soft. Bowel sounds are normal. There is no tenderness.   Musculoskeletal: He exhibits no edema or tenderness.        Hands:  1 cm laceration at postero- lateral hand,about 1 cm below 5th knuckle   Skin: Skin is warm. No rash noted. He is not diaphoretic.       ED Course        Procedures                   No results found for this or any previous visit (from the past 24 hour(s)).    Medications   naproxen (NAPROSYN) tablet 500 mg (500 mg Oral Given 9/2/19 0037)       Assessments & Plan (with Medical Decision Making)   Laceration of R hand, pt has some difficulty to fully extend 5th finger  laceration " repaired with placing stri strip that resulted proper approximation.  Difficulty to fully extend 5th finger likely due to pain and soft tisssue swelling, exploring of laceration did reveal any tendon injury    Regardless I advised him to follow with hand/ plastic surgeon in 1 wk for reevaluation, he voiced understanding and agreed.      I have reviewed the nursing notes.    I have reviewed the findings, diagnosis, plan and need for follow up with the patient.      Discharge Medication List as of 9/2/2019 12:39 AM          Final diagnoses:   Laceration of right hand without foreign body, initial encounter       9/1/2019   [facility]     Ganga Lambert MD  09/03/19 0327

## 2019-09-02 NOTE — ED NOTES
Pt presents with lac on right hand. States he was working on his [employer], trying to remove a bolt, when his hand slipped and hit the chain guard. Hand is very dirty from working on machine, placed in soapy water basin to cleanse.

## 2019-12-24 ENCOUNTER — HOSPITAL ENCOUNTER (EMERGENCY)
Facility: OTHER | Age: 29
End: 2019-12-24

## 2020-02-09 ENCOUNTER — HOSPITAL ENCOUNTER (EMERGENCY)
Facility: HOSPITAL | Age: 30
Discharge: HOME | End: 2020-02-09
Attending: INTERNAL MEDICINE | Admitting: INTERNAL MEDICINE
Payer: COMMERCIAL

## 2020-02-09 ENCOUNTER — APPOINTMENT (OUTPATIENT)
Dept: GENERAL RADIOLOGY | Facility: HOSPITAL | Age: 30
End: 2020-02-09
Attending: INTERNAL MEDICINE
Payer: COMMERCIAL

## 2020-02-09 VITALS
BODY MASS INDEX: 29.12 KG/M2 | SYSTOLIC BLOOD PRESSURE: 144 MMHG | WEIGHT: 208.78 LBS | RESPIRATION RATE: 12 BRPM | DIASTOLIC BLOOD PRESSURE: 96 MMHG | OXYGEN SATURATION: 97 % | TEMPERATURE: 97.3 F | HEART RATE: 81 BPM

## 2020-02-09 DIAGNOSIS — F10.90 ALCOHOL INTAKE ABOVE RECOMMENDED SENSIBLE LIMITS: ICD-10-CM

## 2020-02-09 DIAGNOSIS — R45.1 AGITATION: ICD-10-CM

## 2020-02-09 LAB
ALBUMIN SERPL-MCNC: 3.7 G/DL (ref 3.4–5)
ALBUMIN UR-MCNC: NEGATIVE MG/DL
ALP SERPL-CCNC: 44 U/L (ref 40–150)
ALT SERPL W P-5'-P-CCNC: 26 U/L (ref 0–70)
AMPHETAMINES UR QL: NOT DETECTED NG/ML
ANION GAP SERPL CALCULATED.3IONS-SCNC: 7 MMOL/L (ref 3–14)
APAP SERPL-MCNC: <2 MG/L (ref 10–30)
APPEARANCE UR: CLEAR
AST SERPL W P-5'-P-CCNC: 16 U/L (ref 0–45)
BACTERIA #/AREA URNS HPF: ABNORMAL /HPF
BARBITURATES UR QL SCN: NOT DETECTED NG/ML
BASOPHILS # BLD AUTO: 0 10E9/L (ref 0–0.2)
BASOPHILS NFR BLD AUTO: 0.5 %
BENZODIAZ UR QL SCN: NOT DETECTED NG/ML
BILIRUB SERPL-MCNC: 0.2 MG/DL (ref 0.2–1.3)
BILIRUB UR QL STRIP: NEGATIVE
BUN SERPL-MCNC: 11 MG/DL (ref 7–30)
BUPRENORPHINE UR QL: NOT DETECTED NG/ML
CALCIUM SERPL-MCNC: 8.6 MG/DL (ref 8.5–10.1)
CANNABINOIDS UR QL: ABNORMAL NG/ML
CHLORIDE SERPL-SCNC: 112 MMOL/L (ref 94–109)
CK SERPL-CCNC: 202 U/L (ref 30–300)
CO2 SERPL-SCNC: 25 MMOL/L (ref 20–32)
COCAINE UR QL SCN: NOT DETECTED NG/ML
COLOR UR AUTO: ABNORMAL
CREAT SERPL-MCNC: 0.95 MG/DL (ref 0.66–1.25)
D-METHAMPHET UR QL: NOT DETECTED NG/ML
DIFFERENTIAL METHOD BLD: ABNORMAL
EOSINOPHIL # BLD AUTO: 0.1 10E9/L (ref 0–0.7)
EOSINOPHIL NFR BLD AUTO: 0.6 %
ERYTHROCYTE [DISTWIDTH] IN BLOOD BY AUTOMATED COUNT: 12.6 % (ref 10–15)
ETHANOL SERPL-MCNC: 0.1 G/DL
ETHANOL SERPL-MCNC: 0.16 G/DL
GFR SERPL CREATININE-BSD FRML MDRD: >90 ML/MIN/{1.73_M2}
GLUCOSE BLDC GLUCOMTR-MCNC: 96 MG/DL (ref 70–99)
GLUCOSE SERPL-MCNC: 93 MG/DL (ref 70–99)
GLUCOSE UR STRIP-MCNC: NEGATIVE MG/DL
HCT VFR BLD AUTO: 39.9 % (ref 40–53)
HGB BLD-MCNC: 13.8 G/DL (ref 13.3–17.7)
HGB UR QL STRIP: NEGATIVE
IMM GRANULOCYTES # BLD: 0 10E9/L (ref 0–0.4)
IMM GRANULOCYTES NFR BLD: 0.5 %
KETONES UR STRIP-MCNC: NEGATIVE MG/DL
LEUKOCYTE ESTERASE UR QL STRIP: NEGATIVE
LYMPHOCYTES # BLD AUTO: 2 10E9/L (ref 0.8–5.3)
LYMPHOCYTES NFR BLD AUTO: 24 %
MCH RBC QN AUTO: 30.4 PG (ref 26.5–33)
MCHC RBC AUTO-ENTMCNC: 34.6 G/DL (ref 31.5–36.5)
MCV RBC AUTO: 88 FL (ref 78–100)
METHADONE UR QL SCN: NOT DETECTED NG/ML
MONOCYTES # BLD AUTO: 0.5 10E9/L (ref 0–1.3)
MONOCYTES NFR BLD AUTO: 6 %
MUCOUS THREADS #/AREA URNS LPF: PRESENT /LPF
NEUTROPHILS # BLD AUTO: 5.8 10E9/L (ref 1.6–8.3)
NEUTROPHILS NFR BLD AUTO: 68.4 %
NITRATE UR QL: NEGATIVE
NRBC # BLD AUTO: 0 10*3/UL
NRBC BLD AUTO-RTO: 0 /100
OPIATES UR QL SCN: NOT DETECTED NG/ML
OXYCODONE UR QL SCN: NOT DETECTED NG/ML
PCP UR QL SCN: NOT DETECTED NG/ML
PH UR STRIP: 6 PH (ref 4.7–8)
PLATELET # BLD AUTO: 311 10E9/L (ref 150–450)
POTASSIUM SERPL-SCNC: 3.8 MMOL/L (ref 3.4–5.3)
PROPOXYPH UR QL: NOT DETECTED NG/ML
PROT SERPL-MCNC: 7.1 G/DL (ref 6.8–8.8)
RBC # BLD AUTO: 4.54 10E12/L (ref 4.4–5.9)
RBC #/AREA URNS AUTO: 0 /HPF (ref 0–2)
SALICYLATES SERPL-MCNC: 2 MG/DL
SODIUM SERPL-SCNC: 144 MMOL/L (ref 133–144)
SOURCE: ABNORMAL
SP GR UR STRIP: 1.01 (ref 1–1.03)
TRICYCLICS UR QL SCN: NOT DETECTED NG/ML
UROBILINOGEN UR STRIP-MCNC: NORMAL MG/DL (ref 0–2)
WBC # BLD AUTO: 8.4 10E9/L (ref 4–11)
WBC #/AREA URNS AUTO: <1 /HPF (ref 0–5)

## 2020-02-09 PROCEDURE — 51702 INSERT TEMP BLADDER CATH: CPT

## 2020-02-09 PROCEDURE — 80320 DRUG SCREEN QUANTALCOHOLS: CPT | Performed by: INTERNAL MEDICINE

## 2020-02-09 PROCEDURE — 80329 ANALGESICS NON-OPIOID 1 OR 2: CPT | Performed by: INTERNAL MEDICINE

## 2020-02-09 PROCEDURE — 25000132 ZZH RX MED GY IP 250 OP 250 PS 637: Performed by: EMERGENCY MEDICINE

## 2020-02-09 PROCEDURE — 96374 THER/PROPH/DIAG INJ IV PUSH: CPT | Mod: XU

## 2020-02-09 PROCEDURE — 96361 HYDRATE IV INFUSION ADD-ON: CPT

## 2020-02-09 PROCEDURE — 82550 ASSAY OF CK (CPK): CPT | Performed by: INTERNAL MEDICINE

## 2020-02-09 PROCEDURE — 93005 ELECTROCARDIOGRAM TRACING: CPT

## 2020-02-09 PROCEDURE — 71045 X-RAY EXAM CHEST 1 VIEW: CPT | Mod: TC

## 2020-02-09 PROCEDURE — 25000128 H RX IP 250 OP 636: Performed by: INTERNAL MEDICINE

## 2020-02-09 PROCEDURE — 80306 DRUG TEST PRSMV INSTRMNT: CPT | Performed by: INTERNAL MEDICINE

## 2020-02-09 PROCEDURE — 00000146 ZZHCL STATISTIC GLUCOSE BY METER IP

## 2020-02-09 PROCEDURE — 25800030 ZZH RX IP 258 OP 636: Performed by: INTERNAL MEDICINE

## 2020-02-09 PROCEDURE — 93010 ELECTROCARDIOGRAM REPORT: CPT | Performed by: INTERNAL MEDICINE

## 2020-02-09 PROCEDURE — 99285 EMERGENCY DEPT VISIT HI MDM: CPT | Mod: Z6 | Performed by: INTERNAL MEDICINE

## 2020-02-09 PROCEDURE — 80329 ANALGESICS NON-OPIOID 1 OR 2: CPT | Mod: 59 | Performed by: INTERNAL MEDICINE

## 2020-02-09 PROCEDURE — 80053 COMPREHEN METABOLIC PANEL: CPT | Performed by: INTERNAL MEDICINE

## 2020-02-09 PROCEDURE — 85025 COMPLETE CBC W/AUTO DIFF WBC: CPT | Performed by: INTERNAL MEDICINE

## 2020-02-09 PROCEDURE — 36415 COLL VENOUS BLD VENIPUNCTURE: CPT | Performed by: INTERNAL MEDICINE

## 2020-02-09 PROCEDURE — 99285 EMERGENCY DEPT VISIT HI MDM: CPT | Mod: 25

## 2020-02-09 PROCEDURE — 81001 URINALYSIS AUTO W/SCOPE: CPT | Performed by: INTERNAL MEDICINE

## 2020-02-09 RX ORDER — SODIUM CHLORIDE 9 MG/ML
INJECTION, SOLUTION INTRAVENOUS CONTINUOUS
Status: DISCONTINUED | OUTPATIENT
Start: 2020-02-09 | End: 2020-02-09 | Stop reason: HOSPADM

## 2020-02-09 RX ORDER — ACETAMINOPHEN 325 MG/1
650 TABLET ORAL EVERY 4 HOURS PRN
Status: CANCELLED | OUTPATIENT
Start: 2020-02-09

## 2020-02-09 RX ORDER — TRAZODONE HYDROCHLORIDE 50 MG/1
50 TABLET, FILM COATED ORAL
Status: CANCELLED | OUTPATIENT
Start: 2020-02-09

## 2020-02-09 RX ORDER — DIPHENHYDRAMINE HYDROCHLORIDE 50 MG/ML
50 INJECTION INTRAMUSCULAR; INTRAVENOUS ONCE
Status: DISCONTINUED | OUTPATIENT
Start: 2020-02-09 | End: 2020-02-09 | Stop reason: HOSPADM

## 2020-02-09 RX ORDER — DROPERIDOL 2.5 MG/ML
INJECTION, SOLUTION INTRAMUSCULAR; INTRAVENOUS
Status: DISCONTINUED
Start: 2020-02-09 | End: 2020-02-09 | Stop reason: WASHOUT

## 2020-02-09 RX ORDER — HYDROXYZINE HYDROCHLORIDE 25 MG/1
50-100 TABLET, FILM COATED ORAL EVERY 4 HOURS PRN
Status: CANCELLED | OUTPATIENT
Start: 2020-02-09

## 2020-02-09 RX ORDER — LORAZEPAM 2 MG/ML
2 INJECTION INTRAMUSCULAR ONCE
Status: DISCONTINUED | OUTPATIENT
Start: 2020-02-09 | End: 2020-02-09 | Stop reason: HOSPADM

## 2020-02-09 RX ORDER — ALUMINA, MAGNESIA, AND SIMETHICONE 2400; 2400; 240 MG/30ML; MG/30ML; MG/30ML
30 SUSPENSION ORAL EVERY 4 HOURS PRN
Status: CANCELLED | OUTPATIENT
Start: 2020-02-09

## 2020-02-09 RX ORDER — HALOPERIDOL 5 MG/ML
5 INJECTION INTRAMUSCULAR ONCE
Status: DISCONTINUED | OUTPATIENT
Start: 2020-02-09 | End: 2020-02-09 | Stop reason: HOSPADM

## 2020-02-09 RX ORDER — METOCLOPRAMIDE HYDROCHLORIDE 5 MG/ML
5 INJECTION INTRAMUSCULAR; INTRAVENOUS ONCE
Status: COMPLETED | OUTPATIENT
Start: 2020-02-09 | End: 2020-02-09

## 2020-02-09 RX ORDER — LORAZEPAM 1 MG/1
2 TABLET ORAL ONCE
Status: COMPLETED | OUTPATIENT
Start: 2020-02-09 | End: 2020-02-09

## 2020-02-09 RX ADMIN — SODIUM CHLORIDE 1000 ML: 9 INJECTION, SOLUTION INTRAVENOUS at 03:42

## 2020-02-09 RX ADMIN — METOCLOPRAMIDE 5 MG: 5 INJECTION, SOLUTION INTRAMUSCULAR; INTRAVENOUS at 04:01

## 2020-02-09 RX ADMIN — LORAZEPAM 2 MG: 1 TABLET ORAL at 09:36

## 2020-02-09 RX ADMIN — SODIUM CHLORIDE: 9 INJECTION, SOLUTION INTRAVENOUS at 04:13

## 2020-02-09 ASSESSMENT — ENCOUNTER SYMPTOMS
ALTERED MENTAL STATUS: 1
LETHARGY: 1

## 2020-02-09 NOTE — ED NOTES
Face to face report given with opportunity to observe patient.    Report given to Berenice Yost RN   2/9/2020  11:14 AM

## 2020-02-09 NOTE — ED NOTES
Dr Parisi is aware that pt refused IM meds.  MD is handing pt care off to oncoming MD.  Pt talking intermittently to 1:1 staff in room.

## 2020-02-09 NOTE — DISCHARGE INSTRUCTIONS
Please follow up with your therapist on Wednesday.   Please take medications as prescribed.     Please follow up with AA.    Thanks for your patience.

## 2020-02-09 NOTE — ED NOTES
"Dr Bar meeting with patients Mother in family consult room. Patient admits to taking 1200mg of gabapentin, denies attempting to overdose. Denies SI/HI. Patient repeatedly yelling \"get me a tox screen\"  "

## 2020-02-09 NOTE — ED NOTES
Called and spoke with Mica from Poison Control, informed writer that peak of Neurontin would peak in 3-4 hours, increasing his drowsiness. Flexeril would peak in 6 hours, may cause hypotension. recommended all routine labs and to monitor for resp depression; intubate as needed. Proivder supportive cares provider updated.

## 2020-02-09 NOTE — ED NOTES
"Updated  X3 for pt mehul out, ripping out IV, mom and pt requesting to speak with MD.  Pt swearing, mehul, making derogatory remarks to MD.  Updated pt that IM injection ordered and States \"I'm not taking that medication and I'm gonna punch you in the face\".  Mom back in room stating that she doesn't think he needs any medication.  Pt states he is \"not suicidal and only took the meds to make his pain go away and it's not physical pain\".  MD has placed pt on a hold.  BA remains above 0.09 so pt cannot be admitted to 5th floor at this time.   "

## 2020-02-09 NOTE — ED NOTES
Discharge instructions reviewed with patient, and patient verbalized understanding. DEC safety plan reviewed with patient, and pt signed. Pt ambulated with a steady gait to the exit with mother.

## 2020-02-09 NOTE — ED NOTES
"Pt brought in by [facility], pt unresponsive at time of arrival, EMS reports that he drank 1.17ml of Fireball Whiskey per his friends. They are the ones who called 911, d/t him \"going in and out of consciousness and vomiting multiple times.\" Also reported that pt took an unknown amount of Neurontin and Flexeril, unknown what time ingestion was. Provider at bedside.   "

## 2020-02-09 NOTE — ED PROVIDER NOTES
History     Chief Complaint   Patient presents with     Drug Overdose     The history is provided by the EMS personnel, the patient and a parent.   Altered Mental Status   Presenting symptoms: lethargy and partial responsiveness    Severity:  Severe  Most recent episode:  Today  Episode history:  Continuous  Timing:  Constant  Context: alcohol use    Associated symptoms: depression      Allergies:  Allergies   Allergen Reactions     Penicillins      Patient denies difficulty breathing with reaction. He reports a mild reaction and has previously tolerated keflex without difficulty.        Problem List:    There are no active problems to display for this patient.       Past Medical History:    Past Medical History:   Diagnosis Date     Bronchitis 02     Headache 4/28/10     Open wound of forehead 4/28/10     Otitis 02     Schizophrenia (H) 4/28/10     URI (upper respiratory infection) 95       Past Surgical History:    Past Surgical History:   Procedure Laterality Date     ARTHROSCOPY KNEE Left 11/10/2017    Procedure: ARTHROSCOPY KNEE;  Left Knee Arthroscopy with Menical Repair;  Surgeon: Jp Pritchard DO;  Location: [facility]     ARTHROSCOPY KNEE Left 2019    Procedure: LEFT KNEE ARTHROSCOPY;  Surgeon: Noel Moralez MD;  Location: [facility]      CREATE EARDRUM OPENING,GEN ANESTH      PE tubes as child       Family History:    Family History   Problem Relation Age of Onset     Asthma Mother      Colon Cancer Maternal Grandfather          at 58     Breast Cancer Maternal Grandmother      Diabetes Paternal Grandmother      Diabetes Paternal Aunt      Diabetes Maternal Aunt      C.A.D. No family hx of      Hypertension No family hx of        Social History:  Marital Status:  Single [1]  Social History     Tobacco Use     Smoking status: Never Smoker     Smokeless tobacco: Current User     Types: Chew     Tobacco comment: pt denied quit plan 19   Substance Use Topics     Alcohol use: Yes      Drug use: Yes     Types: Marijuana        Medications:    acetaminophen (TYLENOL) 500 MG tablet          Review of Systems   Unable to perform ROS: Mental status change       Physical Exam   BP: 147/87  Pulse: 70  Heart Rate: 66  Temp: 97.3  F (36.3  C)  Resp: 14  Weight: 94.7 kg (208 lb 12.4 oz)  SpO2: 98 %      Physical Exam  Vitals signs and nursing note reviewed.   Constitutional:       Appearance: He is well-developed.   HENT:      Head: Normocephalic and atraumatic.   Eyes:      Conjunctiva/sclera: Conjunctivae normal.      Pupils: Pupils are equal, round, and reactive to light.   Neck:      Musculoskeletal: Normal range of motion and neck supple.      Thyroid: No thyromegaly.      Vascular: No JVD.      Trachea: No tracheal deviation.   Cardiovascular:      Rate and Rhythm: Normal rate and regular rhythm.      Heart sounds: Normal heart sounds. No murmur. No gallop.    Pulmonary:      Effort: Pulmonary effort is normal. No respiratory distress.      Breath sounds: Normal breath sounds. No stridor. No wheezing or rales.   Chest:      Chest wall: No tenderness.   Abdominal:      General: Bowel sounds are normal. There is no distension.      Palpations: Abdomen is soft. There is no mass.      Tenderness: There is no abdominal tenderness. There is no guarding or rebound.   Musculoskeletal: Normal range of motion.         General: No tenderness.   Lymphadenopathy:      Cervical: No cervical adenopathy.   Skin:     General: Skin is warm.      Coloration: Skin is not pale.      Findings: No erythema or rash.   Neurological:      Mental Status: He is oriented to person, place, and time. He is lethargic.   Psychiatric:         Behavior: Behavior normal.         ED Course        Procedures                   Results for orders placed or performed during the hospital encounter of 02/09/20 (from the past 24 hour(s))   Glucose by meter   Result Value Ref Range    Glucose 96 70 - 99 mg/dL   CBC with platelets  differential   Result Value Ref Range    WBC 8.4 4.0 - 11.0 10e9/L    RBC Count 4.54 4.4 - 5.9 10e12/L    Hemoglobin 13.8 13.3 - 17.7 g/dL    Hematocrit 39.9 (L) 40.0 - 53.0 %    MCV 88 78 - 100 fl    MCH 30.4 26.5 - 33.0 pg    MCHC 34.6 31.5 - 36.5 g/dL    RDW 12.6 10.0 - 15.0 %    Platelet Count 311 150 - 450 10e9/L    Diff Method Automated Method     % Neutrophils 68.4 %    % Lymphocytes 24.0 %    % Monocytes 6.0 %    % Eosinophils 0.6 %    % Basophils 0.5 %    % Immature Granulocytes 0.5 %    Nucleated RBCs 0 0 /100    Absolute Neutrophil 5.8 1.6 - 8.3 10e9/L    Absolute Lymphocytes 2.0 0.8 - 5.3 10e9/L    Absolute Monocytes 0.5 0.0 - 1.3 10e9/L    Absolute Eosinophils 0.1 0.0 - 0.7 10e9/L    Absolute Basophils 0.0 0.0 - 0.2 10e9/L    Abs Immature Granulocytes 0.0 0 - 0.4 10e9/L    Absolute Nucleated RBC 0.0    Comprehensive metabolic panel   Result Value Ref Range    Sodium 144 133 - 144 mmol/L    Potassium 3.8 3.4 - 5.3 mmol/L    Chloride 112 (H) 94 - 109 mmol/L    Carbon Dioxide 25 20 - 32 mmol/L    Anion Gap 7 3 - 14 mmol/L    Glucose 93 70 - 99 mg/dL    Urea Nitrogen 11 7 - 30 mg/dL    Creatinine 0.95 0.66 - 1.25 mg/dL    GFR Estimate >90 >60 mL/min/[1.73_m2]    GFR Estimate If Black >90 >60 mL/min/[1.73_m2]    Calcium 8.6 8.5 - 10.1 mg/dL    Bilirubin Total 0.2 0.2 - 1.3 mg/dL    Albumin 3.7 3.4 - 5.0 g/dL    Protein Total 7.1 6.8 - 8.8 g/dL    Alkaline Phosphatase 44 40 - 150 U/L    ALT 26 0 - 70 U/L    AST 16 0 - 45 U/L   CK total   Result Value Ref Range    CK Total 202 30 - 300 U/L   Alcohol ethyl   Result Value Ref Range    Ethanol g/dL 0.16 (H) 0.01 g/dL   Acetaminophen level   Result Value Ref Range    Acetaminophen Level <2 (L) 10 - 30 mg/L   Salicylate level   Result Value Ref Range    Salicylate Level 2 mg/dL   UA with Microscopic reflex to Culture   Result Value Ref Range    Color Urine Straw     Appearance Urine Clear     Glucose Urine Negative NEG^Negative mg/dL    Bilirubin Urine Negative  NEG^Negative    Ketones Urine Negative NEG^Negative mg/dL    Specific Gravity Urine 1.008 1.003 - 1.035    Blood Urine Negative NEG^Negative    pH Urine 6.0 4.7 - 8.0 pH    Protein Albumin Urine Negative NEG^Negative mg/dL    Urobilinogen mg/dL Normal 0.0 - 2.0 mg/dL    Nitrite Urine Negative NEG^Negative    Leukocyte Esterase Urine Negative NEG^Negative    Source Catheterized Urine     WBC Urine <1 0 - 5 /HPF    RBC Urine 0 0 - 2 /HPF    Bacteria Urine None (A) NEG^Negative /HPF    Mucous Urine Present (A) NEG^Negative /LPF   Urine Drugs of Abuse Screen Panel 13   Result Value Ref Range    Cannabinoids (30-jui-0-carboxy-9-THC) Detected, Abnormal Result (A) NDET^Not Detected ng/mL    Phencyclidine (Phencyclidine) Not Detected NDET^Not Detected ng/mL    Cocaine (Benzoylecgonine) Not Detected NDET^Not Detected ng/mL    Methamphetamine (d-Methamphetamine) Not Detected NDET^Not Detected ng/mL    Opiates (Morphine) Not Detected NDET^Not Detected ng/mL    Amphetamine (d-Amphetamine) Not Detected NDET^Not Detected ng/mL    Benzodiazepines (Nordiazepam) Not Detected NDET^Not Detected ng/mL    Tricyclic Antidepressants (Desipramine) Not Detected NDET^Not Detected ng/mL    Methadone (Methadone) Not Detected NDET^Not Detected ng/mL    Barbiturates (Butalbital) Not Detected NDET^Not Detected ng/mL    Oxycodone (Oxycodone) Not Detected NDET^Not Detected ng/mL    Propoxyphene (Norpropoxyphene) Not Detected NDET^Not Detected ng/mL    Buprenorphine (Buprenorphine) Not Detected NDET^Not Detected ng/mL   XR Chest Port 1 View    Narrative    PROCEDURE: XR CHEST PORT 1 VW 2/9/2020 4:02 AM    HISTORY: sob    COMPARISONS: 14 2014.    TECHNIQUE: Single view.    FINDINGS: Heart and pulmonary vasculature are normal. No definite  infiltrate or effusion is seen.         Impression    IMPRESSION: No acute disease.    GEMA PRADHAN MD   Alcohol ethyl   Result Value Ref Range    Ethanol g/dL 0.10 (H) 0.01 g/dL       Medications   sodium  chloride 0.9% infusion ( Intravenous Stopped 2/9/20 0751)   LORazepam (ATIVAN) injection 2 mg (has no administration in time range)   haloperidol lactate (HALDOL) injection 5 mg (has no administration in time range)   diphenhydrAMINE (BENADRYL) injection 50 mg (has no administration in time range)   0.9% sodium chloride BOLUS (0 mLs Intravenous Stopped 2/9/20 0409)   metoclopramide (REGLAN) injection 5 mg (5 mg Intravenous Given 2/9/20 0401)   LORazepam (ATIVAN) tablet 2 mg (2 mg Oral Given 2/9/20 0936)       Assessments & Plan (with Medical Decision Making)    Eddi Trejo is a 30 year old male known to the ER agitated concerns for possible suicide attempt via gabapentin.  After patient was de-escalated we did get an appropriate assessment including a DEC.  Patient is safe for discharge he will follow-up with his therapist this Wednesday and get enrolled in Alcoholics Anonymous.   As per EMS pt took gabapentin + flexeril + alcohol insuicidal attempt  Pt lethargic, not responsive  Pt later woke and stated he took 45 tablet of gabapentin 300mg and not flexeril  Poison controlled recommended 4 hrs of observation in ER  Pt is medically clear to transfer to behavioral unit, called central intake and patient accepted for admission to behavioral unit.   Later pt became AAox3, very agitated, took his IV line out and wanted to leave hospitation  IV benedryl, haldol, ativan ordered , s/o to Dr Bar at the change of shift  I have reviewed the nursing notes.    I have reviewed the findings, diagnosis, plan and need for follow up with the patient.      New Prescriptions    No medications on file       Final diagnoses:   Alcohol intake above recommended sensible limits   Agitation       2/9/2020   [facility]     Ganga Lambert MD  02/09/20 0805       Onur Bar MD  02/09/20 1129

## 2020-02-09 NOTE — ED NOTES
"Pt waking up, states he did not take any Flexeril tablets, but did state he took 45 tablets of 300mg Neurontin tablets, \"wasn't trying to kill himself,\" but unable to sate why he took so many tabs. Also reports he \"drank a lot of alcohol.: Unknown time of ingestion. Provider updated.   "

## 2020-02-09 NOTE — ED NOTES
Called and updated Poison Control, no further recommendations at this time, to continue to monitor until the peak of the Neurontin for 3-4 hours. Will update provider.

## 2020-02-09 NOTE — ED NOTES
Nurse to nurse report given to Keerthi and pt is ready to transfer to 5th floor - [address] [place] [address].  VSS as charted. Denies pain. IV out and site is WDL.

## 2020-02-09 NOTE — ED AVS SNAPSHOT
[facility]  [address]  [place] [address]  Phone:  [phone]                                    Eddi Trejo   MRN: [id number]    Department:  [facility]   Date of Visit:  2/9/2020           After Visit Summary Signature Page    I have received my discharge instructions, and my questions have been answered. I have discussed any challenges I see with this plan with the nurse or doctor.    ..........................................................................................................................................  Patient/Patient Representative Signature      ..........................................................................................................................................  Patient Representative Print Name and Relationship to Patient    ..................................................               ................................................  Date                                   Time    ..........................................................................................................................................  Reviewed by Signature/Title    ...................................................              ..............................................  Date                                               Time          22EPIC Rev 08/18

## 2020-02-09 NOTE — ED NOTES
Security and house supervisor and 1:1 here with pt to transfer. Pt became agitated and hollering and swearing and requesting to go home with mom.  Pt mom outside room and request to talk with MD.  This nurse and MD met with mom in consult room.  Reports pt did not have gabapentin at home.  Spoke with pt after visit with mom and agrees to DEC assessment and requests a pill to calm him down.  Moved to room 7 with security in room with pt.  Pt is now cooperative and no further swearing. Pt reports to nurse he took 1200 mgs and admits he got the pills from his cousin. Updated RN on [address] south and is aware  Pt may still be admitted - delay only at this time pending DEC assessment.

## 2020-02-09 NOTE — ED NOTES
Central Intake called and states patient has been accepted to [address] [place] [address] under Dr. Grayson.

## 2020-03-04 DIAGNOSIS — Z79.899 ENCOUNTER FOR LONG-TERM (CURRENT) USE OF OTHER MEDICATIONS: Primary | ICD-10-CM

## 2020-03-10 DIAGNOSIS — Z79.899 ENCOUNTER FOR LONG-TERM (CURRENT) USE OF OTHER MEDICATIONS: Primary | ICD-10-CM

## 2020-03-27 ENCOUNTER — APPOINTMENT (OUTPATIENT)
Dept: GENERAL RADIOLOGY | Facility: HOSPITAL | Age: 30
End: 2020-03-27
Attending: PHYSICIAN ASSISTANT
Payer: COMMERCIAL

## 2020-03-27 ENCOUNTER — HOSPITAL ENCOUNTER (EMERGENCY)
Facility: HOSPITAL | Age: 30
Discharge: HOME | End: 2020-03-27
Attending: PHYSICIAN ASSISTANT | Admitting: PHYSICIAN ASSISTANT
Payer: COMMERCIAL

## 2020-03-27 VITALS
RESPIRATION RATE: 16 BRPM | HEART RATE: 68 BPM | DIASTOLIC BLOOD PRESSURE: 83 MMHG | TEMPERATURE: 98.5 F | OXYGEN SATURATION: 99 % | SYSTOLIC BLOOD PRESSURE: 130 MMHG
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
## 2020-03-27 DIAGNOSIS — S51.851A DOG BITE OF RIGHT FOREARM, INITIAL ENCOUNTER: ICD-10-CM

## 2020-03-27 DIAGNOSIS — S51.811A LACERATION OF RIGHT FOREARM, INITIAL ENCOUNTER: ICD-10-CM

## 2020-03-27 PROCEDURE — 96375 TX/PRO/DX INJ NEW DRUG ADDON: CPT

## 2020-03-27 PROCEDURE — G0463 HOSPITAL OUTPT CLINIC VISIT: HCPCS | Mod: 25

## 2020-03-27 PROCEDURE — 90471 IMMUNIZATION ADMIN: CPT

## 2020-03-27 PROCEDURE — 36415 COLL VENOUS BLD VENIPUNCTURE: CPT | Performed by: PHYSICIAN ASSISTANT

## 2020-03-27 PROCEDURE — 96372 THER/PROPH/DIAG INJ SC/IM: CPT

## 2020-03-27 PROCEDURE — 90715 TDAP VACCINE 7 YRS/> IM: CPT | Performed by: PHYSICIAN ASSISTANT

## 2020-03-27 PROCEDURE — 96365 THER/PROPH/DIAG IV INF INIT: CPT

## 2020-03-27 PROCEDURE — 86803 HEPATITIS C AB TEST: CPT | Performed by: PHYSICIAN ASSISTANT

## 2020-03-27 PROCEDURE — 73090 X-RAY EXAM OF FOREARM: CPT | Mod: TC,RT

## 2020-03-27 PROCEDURE — 25000128 H RX IP 250 OP 636: Performed by: PHYSICIAN ASSISTANT

## 2020-03-27 PROCEDURE — 99214 OFFICE O/P EST MOD 30 MIN: CPT | Mod: Z6 | Performed by: PHYSICIAN ASSISTANT

## 2020-03-27 PROCEDURE — 96374 THER/PROPH/DIAG INJ IV PUSH: CPT

## 2020-03-27 RX ORDER — MORPHINE SULFATE 4 MG/ML
4 INJECTION, SOLUTION INTRAMUSCULAR; INTRAVENOUS ONCE
Status: COMPLETED | OUTPATIENT
Start: 2020-03-27 | End: 2020-03-27

## 2020-03-27 RX ORDER — KETOROLAC TROMETHAMINE 30 MG/ML
30 INJECTION, SOLUTION INTRAMUSCULAR; INTRAVENOUS ONCE
Status: COMPLETED | OUTPATIENT
Start: 2020-03-27 | End: 2020-03-27

## 2020-03-27 RX ORDER — HYDROCODONE BITARTRATE AND ACETAMINOPHEN 5; 325 MG/1; MG/1
1 TABLET ORAL EVERY 6 HOURS PRN
Qty: 8 TABLET | Refills: 0 | Status: SHIPPED | OUTPATIENT
Start: 2020-03-27 | End: 2020-04-23

## 2020-03-27 RX ORDER — CEFTRIAXONE SODIUM 2 G/50ML
2 INJECTION, SOLUTION INTRAVENOUS ONCE
Status: COMPLETED | OUTPATIENT
Start: 2020-03-27 | End: 2020-03-27

## 2020-03-27 RX ORDER — CLINDAMYCIN HCL 300 MG
300 CAPSULE ORAL 4 TIMES DAILY
Qty: 40 CAPSULE | Refills: 0 | Status: SHIPPED | OUTPATIENT
Start: 2020-03-27 | End: 2020-05-01

## 2020-03-27 RX ADMIN — CLOSTRIDIUM TETANI TOXOID ANTIGEN (FORMALDEHYDE INACTIVATED), CORYNEBACTERIUM DIPHTHERIAE TOXOID ANTIGEN (FORMALDEHYDE INACTIVATED), BORDETELLA PERTUSSIS TOXOID ANTIGEN (GLUTARALDEHYDE INACTIVATED), BORDETELLA PERTUSSIS FILAMENTOUS HEMAGGLUTININ ANTIGEN (FORMALDEHYDE INACTIVATED), BORDETELLA PERTUSSIS PERTACTIN ANTIGEN, AND BORDETELLA PERTUSSIS FIMBRIAE 2/3 ANTIGEN 0.5 ML: 5; 2; 2.5; 5; 3; 5 INJECTION, SUSPENSION INTRAMUSCULAR at 18:20

## 2020-03-27 RX ADMIN — KETOROLAC TROMETHAMINE 30 MG: 30 INJECTION, SOLUTION INTRAMUSCULAR at 18:11

## 2020-03-27 RX ADMIN — CEFTRIAXONE SODIUM 2 G: 2 INJECTION, SOLUTION INTRAVENOUS at 18:12

## 2020-03-27 RX ADMIN — MORPHINE SULFATE 4 MG: 4 INJECTION, SOLUTION INTRAMUSCULAR; INTRAVENOUS at 18:08

## 2020-03-27 NOTE — ED PROVIDER NOTES
History     Chief Complaint   Patient presents with     Dog Bite     HPI  Eddi Trejo is a 30 year old male who presents with complaints of right forearm laceration after being bitten by his cousin's adalberto.  Significant amount of bleeding.  Patient believes the bone may have been broken.  Loss of sensation in the thumb.  Tingling in the remainder of the fingers.  Patient's last tetanus was 2012.  Dog was a shelter dog prior to being taken in by his cousin and is UTD on rabies immunization.  Dog bit the patient's cousin first and then bit the patient.  Patient's cousin has Hep C.      Allergies:  Allergies   Allergen Reactions     Penicillins      Patient denies difficulty breathing with reaction. He reports a mild reaction and has previously tolerated keflex without difficulty.        Problem List:    There are no active problems to display for this patient.       Past Medical History:    Past Medical History:   Diagnosis Date     Bronchitis 4/25/02     Headache 4/28/10     Open wound of forehead 4/28/10     Otitis 4/18/02     Schizophrenia (H) 4/28/10     URI (upper respiratory infection) 12/24/95       Social History:  Marital Status:  Single [1]  Social History     Tobacco Use     Smoking status: Never Smoker     Smokeless tobacco: Current User     Types: Chew     Tobacco comment: pt denied quit plan 4/16/19   Substance Use Topics     Alcohol use: Yes     Drug use: Yes     Types: Marijuana        Medications:    clindamycin (CLEOCIN) 300 MG capsule  HYDROcodone-acetaminophen (NORCO) 5-325 MG tablet  acetaminophen (TYLENOL) 500 MG tablet          Review of Systems :  Constitutional: Negative for fever.    Skin: Positive for laceration right forearm.    Neuro: Positive for decreased sensation distal to injury.    Physical Exam   BP: 127/94  Pulse: 68  Heart Rate: 57  Temp: 98.5  F (36.9  C)  Resp: 16  SpO2: 99 %    Physical Exam   Constitutional: Well-developed and well-nourished.   Head:  Normocephalic and atraumatic.   Eyes: Conjunctivae and EOM are normal. Pupils are equal, round, and reactive to light.   Neck: Normal range of motion.   Pulmonary/Chest: Effort normal  Skin: 5 cm irregular laceration of the dorsal surface of mid-forearm.  4 cm laceration of the ventral surface of the mid-forearm.  There are four puncture wounds on the dorsal surface near the wrist..   Neuro: Gait normal.  No sensation with light touch of the thumb.  Sensation intact in the remainder of the fingers.    MS: ROM of the right forearm limited due to severity of the pain.  There appears to be a partial laceration of the muscles of the forearm based on how the muscle gathers on extension of the fingers.                    ED Course               Results for orders placed or performed during the hospital encounter of 03/27/20 (from the past 24 hour(s))   XR Forearm Right 2 Views    Narrative    XR FOREARM RT 2 VW    HISTORY: 30 years Male dog bite    COMPARISON: None    TECHNIQUE: Right forearm 2 views    FINDINGS: There is a soft tissue wound of the distal third of the  forearm. Soft tissue air is present. No radiopaque foreign bodies are  seen. There is no evidence of fracture.      Impression    IMPRESSION: Soft tissue wound without evidence of fracture or  radiopaque foreign body.    ABDIEL NICHOLS MD       Medications   HYDROcodone-acetaminophen (NORCO) 5-325 MG 6 tab ED starter pack 1 tablet (has no administration in time range)   cefTRIAXone IN D5W (ROCEPHIN) intermittent infusion 2 g (0 g Intravenous Stopped 3/27/20 1921)   morphine (PF) injection 4 mg (4 mg Intravenous Given 3/27/20 1808)   Tdap (tetanus-diphtheria-acell pertussis) (ADACEL) injection 0.5 mL (0.5 mLs Intramuscular Given 3/27/20 1820)   ketorolac (TORADOL) injection 30 mg (30 mg Intravenous Given 3/27/20 1811)       Assessments & Plan (with Medical Decision Making)     I have reviewed the nursing notes.    Spoke to Dr. Gatica, plastic surgeon  on-call, who recommended IV antibiotics, thorough washout, pack with saline gauze, cover with bulky dressing and be seen in [facility] ED before 10 AM tomorrow for further evaluation.  Analgesia obtained with 15 mL 1% lidocaine without epi.  Wound was thoroughly irrigated with 1L NS.  There was not significant open space to allow for packing but a strip of packing material was placed and bulky dressing applied.  Patient tolerated well.      Blood drawn for initial Hep C testing.  Patient's tetanus updated.  2 g Rocephin given.  Prescription for Augmentin sent.         Medication List      Started    clindamycin 300 MG capsule  Commonly known as:  CLEOCIN  300 mg, Oral, 4 TIMES DAILY     HYDROcodone-acetaminophen 5-325 MG tablet  Commonly known as:  NORCO  1 tablet, Oral, EVERY 6 HOURS PRN            Final diagnoses:   Dog bite of right forearm, initial encounter   Laceration of right forearm, initial encounter       DIONICIO Isbell on 3/27/2020 at 6:05 PM   3/27/2020   [facility]          Levy Pederson PA  03/27/20 2021       Levy Pederson PA  03/27/20 2027

## 2020-03-27 NOTE — ED TRIAGE NOTES
Pt presents with having been bit by a dog today.Largest bite frankie on his right forearm is 2 inches. On the bottom of his arm is a wound that is 1-1/2 inches. States he can't make a fist with his right hand.

## 2020-03-27 NOTE — ED TRIAGE NOTES
Pt states he was bit by a dog. Pt states dog has had shots. Wound not covered on arrival. 4inch laceration noted.

## 2020-03-27 NOTE — ED AVS SNAPSHOT
[facility]  [address]  [place] [address]  Phone:  [phone]                                    Eddi Trejo   MRN: [id number]    Department:  [facility]   Date of Visit:  3/27/2020           After Visit Summary Signature Page    I have received my discharge instructions, and my questions have been answered. I have discussed any challenges I see with this plan with the nurse or doctor.    ..........................................................................................................................................  Patient/Patient Representative Signature      ..........................................................................................................................................  Patient Representative Print Name and Relationship to Patient    ..................................................               ................................................  Date                                   Time    ..........................................................................................................................................  Reviewed by Signature/Title    ...................................................              ..............................................  Date                                               Time          22EPIC Rev 08/18

## 2020-03-30 LAB — HCV AB SERPL QL IA: NONREACTIVE

## 2020-04-23 ENCOUNTER — HOSPITAL ENCOUNTER (EMERGENCY)
Facility: HOSPITAL | Age: 30
Discharge: HOME | End: 2020-04-23
Attending: NURSE PRACTITIONER | Admitting: NURSE PRACTITIONER
Payer: COMMERCIAL

## 2020-04-23 VITALS
RESPIRATION RATE: 20 BRPM | OXYGEN SATURATION: 96 % | TEMPERATURE: 98.7 F | SYSTOLIC BLOOD PRESSURE: 145 MMHG | DIASTOLIC BLOOD PRESSURE: 93 MMHG

## 2020-04-23 DIAGNOSIS — S01.01XA SCALP LACERATION: Primary | ICD-10-CM

## 2020-04-23 DIAGNOSIS — S09.90XA CLOSED HEAD INJURY: ICD-10-CM

## 2020-04-23 PROCEDURE — 25000125 ZZHC RX 250: Performed by: NURSE PRACTITIONER

## 2020-04-23 PROCEDURE — 25000132 ZZH RX MED GY IP 250 OP 250 PS 637: Performed by: NURSE PRACTITIONER

## 2020-04-23 PROCEDURE — 12002 RPR S/N/AX/GEN/TRNK2.6-7.5CM: CPT | Performed by: NURSE PRACTITIONER

## 2020-04-23 PROCEDURE — G0463 HOSPITAL OUTPT CLINIC VISIT: HCPCS | Mod: 25

## 2020-04-23 PROCEDURE — 12002 RPR S/N/AX/GEN/TRNK2.6-7.5CM: CPT

## 2020-04-23 PROCEDURE — 99213 OFFICE O/P EST LOW 20 MIN: CPT | Mod: 25 | Performed by: NURSE PRACTITIONER

## 2020-04-23 RX ORDER — HYDROCODONE BITARTRATE AND ACETAMINOPHEN 5; 325 MG/1; MG/1
1 TABLET ORAL ONCE
Status: COMPLETED | OUTPATIENT
Start: 2020-04-23 | End: 2020-04-23

## 2020-04-23 RX ADMIN — HYDROCODONE BITARTRATE AND ACETAMINOPHEN 1 TABLET: 5; 325 TABLET ORAL at 14:42

## 2020-04-23 RX ADMIN — Medication: at 14:43

## 2020-04-23 ASSESSMENT — ENCOUNTER SYMPTOMS: WOUND: 1

## 2020-04-23 NOTE — ED TRIAGE NOTES
"Patient states he was going up his stairs when he hit his head on low ceiling and believes he cut his head on a nail.  Denies any other injury.  Patient is awake/alert and interactive.   Tetanus 3-4 weeks ago here.  Wound is \"v\" shaped, patient with towel from home; with minimal pressure bleeding is controlled.  "

## 2020-04-23 NOTE — ED AVS SNAPSHOT
[facility]  [address]  [place] [address]  Phone:  [phone]                                    Eddi Trejo   MRN: [id number]    Department:  [facility]   Date of Visit:  4/23/2020           After Visit Summary Signature Page    I have received my discharge instructions, and my questions have been answered. I have discussed any challenges I see with this plan with the nurse or doctor.    ..........................................................................................................................................  Patient/Patient Representative Signature      ..........................................................................................................................................  Patient Representative Print Name and Relationship to Patient    ..................................................               ................................................  Date                                   Time    ..........................................................................................................................................  Reviewed by Signature/Title    ...................................................              ..............................................  Date                                               Time          22EPIC Rev 08/18

## 2020-04-23 NOTE — DISCHARGE INSTRUCTIONS
Take Tylenol or ibuprofen as needed for pain.  Keep the wound clean and dry and observe for signs of infection as discussed.    Limit screen time, get some rest and avoid bright lights.    Return here in 7 to 14 days to get staples removed.    Return to emergency department for worsening or concerning symptoms.

## 2020-04-23 NOTE — ED PROVIDER NOTES
History     Chief Complaint   Patient presents with     Head Laceration     HPI  Eddi Trejo is a 30 year old male who presents to  for a head laceration. He was going up the stairs when his head hit a beam. Denies LOC. He was dizzy at first but that has since resolved. Denies nausea, vomiting or vision changes. Last tetanus was 2020.    Allergies:  Allergies   Allergen Reactions     Penicillins      Patient denies difficulty breathing with reaction. He reports a mild reaction and has previously tolerated keflex without difficulty.        Problem List:    There are no active problems to display for this patient.       Past Medical History:    Past Medical History:   Diagnosis Date     Bronchitis 02     Headache 4/28/10     Open wound of forehead 4/28/10     Otitis 02     Schizophrenia (H) 4/28/10     URI (upper respiratory infection) 95       Past Surgical History:    Past Surgical History:   Procedure Laterality Date     ARTHROSCOPY KNEE Left 11/10/2017    Procedure: ARTHROSCOPY KNEE;  Left Knee Arthroscopy with Menical Repair;  Surgeon: Jp Pritchard DO;  Location: [facility]     ARTHROSCOPY KNEE Left 2019    Procedure: LEFT KNEE ARTHROSCOPY;  Surgeon: Noel Moralez MD;  Location: [facility]     HC CREATE EARDRUM OPENING,GEN ANESTH      PE tubes as child       Family History:    Family History   Problem Relation Age of Onset     Asthma Mother      Colon Cancer Maternal Grandfather          at 58     Breast Cancer Maternal Grandmother      Diabetes Paternal Grandmother      Diabetes Paternal Aunt      Diabetes Maternal Aunt      C.A.D. No family hx of      Hypertension No family hx of        Social History:  Marital Status:  Single [1]  Social History     Tobacco Use     Smoking status: Never Smoker     Smokeless tobacco: Current User     Types: Chew     Tobacco comment: pt denied quit plan 19   Substance Use Topics     Alcohol use: Yes     Drug use: Yes     Types:  Marijuana        Medications:    acetaminophen (TYLENOL) 500 MG tablet          Review of Systems   Skin: Positive for wound.   All other systems reviewed and are negative.      Physical Exam   BP: 145/93  Heart Rate: 70  Temp: 98.7  F (37.1  C)  Resp: 20  SpO2: 96 %      Physical Exam  Vitals signs and nursing note reviewed.   Constitutional:       Appearance: He is not ill-appearing or toxic-appearing.   HENT:      Head: Laceration present.        Comments: 3 lacerations to top of head measurin 0.75in; 1.5in and 0.75 in respectively. Bleeding controlled. The 2 outer lacerations (measuring 0.75in) are superficial. The middle laceration is about 13mm deep.      Nose: Nose normal.      Mouth/Throat:      Mouth: Mucous membranes are moist.   Eyes:      Extraocular Movements: Extraocular movements intact.      Pupils: Pupils are equal, round, and reactive to light.   Neck:      Musculoskeletal: Normal range of motion and neck supple.   Cardiovascular:      Rate and Rhythm: Normal rate.   Pulmonary:      Effort: Pulmonary effort is normal.   Neurological:      General: No focal deficit present.      Mental Status: He is alert and oriented to person, place, and time.      GCS: GCS eye subscore is 4. GCS verbal subscore is 5. GCS motor subscore is 6.      Cranial Nerves: Cranial nerves are intact. No cranial nerve deficit.      Sensory: Sensation is intact.      Motor: Motor function is intact.      Coordination: Coordination is intact.      Gait: Gait is intact.      Comments: Alert and oriented to person, place and time. Answering questions appropriately.          ED Course        [facility]    -Laceration Repair    Date/Time: 4/23/2020 2:32 PM  Performed by: Janell Ojeda CNP  Authorized by: Janell Ojeda CNP       ANESTHESIA (see MAR for exact dosages):     Anesthesia method:  Topical application    Topical anesthetic:  LET  LACERATION DETAILS     Location:  Scalp    Scalp location:  Mid-scalp     Length (cm):  3.8    Depth (mm):  13    REPAIR TYPE:     Repair type:  Simple      EXPLORATION:     Hemostasis achieved with:  LET and direct pressure    Wound exploration: wound explored through full range of motion and entire depth of wound probed and visualized      Wound extent: foreign bodies/material      Wound extent: no nerve damage and no underlying fracture      Foreign bodies/material:  Wood splinter    TREATMENT:     Area cleansed with:  Saline    Amount of cleaning:  Extensive    Irrigation solution:  Sterile saline    Irrigation volume:  60    Irrigation method:  Pressure wash    Visualized foreign bodies/material removed: yes      SKIN REPAIR     Repair method:  Staples    Number of staples:  5    APPROXIMATION     Approximation:  Close  PROCEDURE   Patient Tolerance:  Patient tolerated the procedure well with no immediate complications          No results found for this or any previous visit (from the past 24 hour(s)).    Medications   lidocaine/EPINEPHrine/tetracaine (LET) solution KIT ( Topical Given 4/23/20 4613)   HYDROcodone-acetaminophen (NORCO) 5-325 MG per tablet 1 tablet (1 tablet Oral Given 4/23/20 1442)       Assessments & Plan (with Medical Decision Making)   Scalp laceration:  3 lacerations to the top of the head.  2 outer ones are fairly superficial measuring about 1.9 cm.  He has a deep middle laceration measuring 3.8 cm.  All 3 lacerations were irrigated with sterile saline.  Foreign object removed.  Laceration repair done with 5 staples placed (see procedure note above).  Patient tolerated well.  Tetanus is up-to-date as of 3/27/2020.  Discussed with patient to take Tylenol or ibuprofen as needed for pain.  Observe for signs of infection not limited to redness, increase in swelling or pus drainage.  Return to emergency department for worsening or concerning symptoms.  Return to emergency department in 7 to 14 days to get staples removed.  Patient verbalized understanding.    Closed  head injury:  Patient hit his head against a beam while going up the stairs at home.  Denies LOC.  Does report his vision went black for a second when he felt dizzy initially.  The symptoms are since resolved.  He is alert and oriented to person place and time.  He is answering questions appropriately.  Cranial nerves II through XII intact.  Discussed concussion red flag symptoms with patient.  Recommended that he decrease screen time and allow his brain to rest.  Return to emergency department for worsening or concerning symptoms.  Follow-up with PCP as needed.  Patient verbalized understanding and agreeable plan of care.    I have reviewed the nursing notes.    I have reviewed the findings, diagnosis, plan and need for follow up with the patient.      Final diagnoses:   Scalp laceration   Closed head injury       4/23/2020   [facility]     Janell Ojeda, CNP  04/23/20 5807

## 2020-04-23 NOTE — ED TRIAGE NOTES
"Pt presents with 3 lacerations to the top of his head. States that he hit a wooden beam while going up his basement stairs. Laceration #1= 3/4\" long, wound #2= 1-1/2\" long, #3= #/4\" long.  "

## 2020-05-01 ENCOUNTER — HOSPITAL ENCOUNTER (EMERGENCY)
Facility: HOSPITAL | Age: 30
Discharge: HOME | End: 2020-05-01
Attending: NURSE PRACTITIONER | Admitting: NURSE PRACTITIONER
Payer: COMMERCIAL

## 2020-05-01 VITALS
OXYGEN SATURATION: 97 % | RESPIRATION RATE: 18 BRPM | HEART RATE: 70 BPM | SYSTOLIC BLOOD PRESSURE: 134 MMHG | TEMPERATURE: 97 F | DIASTOLIC BLOOD PRESSURE: 93 MMHG

## 2020-05-01 PROCEDURE — G0463 HOSPITAL OUTPT CLINIC VISIT: HCPCS

## 2020-05-01 RX ORDER — GABAPENTIN 800 MG/1
800 TABLET ORAL 3 TIMES DAILY
COMMUNITY
Start: 2020-03-04

## 2020-05-01 NOTE — ED NOTES
Staples removed easily and without issue. Continues to deny pain. No noted bleeding or oozing at this time.

## 2020-05-01 NOTE — ED NOTES
Discharge instructions given. Verbalized understanding. Denies pain. No noted bleeding at staple removal site. Ambulated out of ED independently.

## 2020-05-01 NOTE — ED AVS SNAPSHOT
[facility]  [address]  [place] [address]  Phone:  [phone]                                    Eddi Trejo   MRN: [id number]    Department:  [facility]   Date of Visit:  5/1/2020           After Visit Summary Signature Page    I have received my discharge instructions, and my questions have been answered. I have discussed any challenges I see with this plan with the nurse or doctor.    ..........................................................................................................................................  Patient/Patient Representative Signature      ..........................................................................................................................................  Patient Representative Print Name and Relationship to Patient    ..................................................               ................................................  Date                                   Time    ..........................................................................................................................................  Reviewed by Signature/Title    ...................................................              ..............................................  Date                                               Time          22EPIC Rev 08/18

## 2020-05-01 NOTE — ED NOTES
Ambulated into  room 6 independently for staple removal. States staples were placed 8 days ago and he was instructed to have them removed in 7 to 14 days. States staples are loose and itch and he just wants them out. Denies any pain. Area of staples is dry, scabbed over, and has no noted redness or swelling.

## 2020-08-24 ENCOUNTER — TELEPHONE (OUTPATIENT)
Dept: FAMILY MEDICINE | Facility: OTHER | Age: 30
End: 2020-08-24

## 2020-08-24 NOTE — TELEPHONE ENCOUNTER
9:53 AM    Reason for Call: OVERBOOK    Patient is having the following symptoms: KNEE PAIN for 1 weeks.    The patient is requesting an appointment for ASAP with Dr. Corrie Chirinos.    Was an appointment offered for this call? No  If yes : Appointment type              Date    Preferred method for responding to this message: Telephone Call  What is your phone number ?[phone] - Shilpa     If we cannot reach you directly, may we leave a detailed response at the number you provided? Yes    Can this message wait until your PCP/provider returns, if unavailable today? Not applicable    [place] Cobb

## 2020-08-26 ENCOUNTER — ANCILLARY PROCEDURE (OUTPATIENT)
Dept: GENERAL RADIOLOGY | Facility: OTHER | Age: 30
End: 2020-08-26
Attending: FAMILY MEDICINE
Payer: COMMERCIAL

## 2020-08-26 ENCOUNTER — OFFICE VISIT (OUTPATIENT)
Dept: FAMILY MEDICINE | Facility: OTHER | Age: 30
End: 2020-08-26
Attending: FAMILY MEDICINE
Payer: COMMERCIAL

## 2020-08-26 VITALS
DIASTOLIC BLOOD PRESSURE: 82 MMHG | TEMPERATURE: 97.8 F | SYSTOLIC BLOOD PRESSURE: 100 MMHG | OXYGEN SATURATION: 98 % | BODY MASS INDEX: 28.59 KG/M2 | HEART RATE: 85 BPM | WEIGHT: 205 LBS

## 2020-08-26 DIAGNOSIS — G89.29 CHRONIC PAIN OF LEFT KNEE: ICD-10-CM

## 2020-08-26 DIAGNOSIS — M25.562 CHRONIC PAIN OF LEFT KNEE: ICD-10-CM

## 2020-08-26 DIAGNOSIS — G89.29 CHRONIC PAIN OF LEFT KNEE: Primary | ICD-10-CM

## 2020-08-26 DIAGNOSIS — M25.562 CHRONIC PAIN OF LEFT KNEE: Primary | ICD-10-CM

## 2020-08-26 PROCEDURE — 99213 OFFICE O/P EST LOW 20 MIN: CPT | Performed by: FAMILY MEDICINE

## 2020-08-26 PROCEDURE — 73562 X-RAY EXAM OF KNEE 3: CPT | Mod: TC

## 2020-08-26 ASSESSMENT — PAIN SCALES - GENERAL: PAINLEVEL: MODERATE PAIN (5)

## 2020-08-26 NOTE — NURSING NOTE
"Chief Complaint   Patient presents with     Knee Pain       Initial /82   Pulse 85   Temp 97.8  F (36.6  C)   Wt 93 kg (205 lb)   SpO2 98%   BMI 28.59 kg/m   Estimated body mass index is 28.59 kg/m  as calculated from the following:    Height as of 9/1/19: 1.803 m (5' 11\").    Weight as of this encounter: 93 kg (205 lb).  Medication Reconciliation: complete  Shannan Price LPN    "

## 2020-10-23 ENCOUNTER — TELEPHONE (OUTPATIENT)
Dept: FAMILY MEDICINE | Facility: OTHER | Age: 30
End: 2020-10-23

## 2020-10-23 DIAGNOSIS — M25.562 CHRONIC PAIN OF LEFT KNEE: Primary | ICD-10-CM

## 2020-10-23 DIAGNOSIS — S83.8X2A INJURY OF MENISCUS OF KNEE, LEFT, INITIAL ENCOUNTER: ICD-10-CM

## 2020-10-23 DIAGNOSIS — G89.29 CHRONIC PAIN OF LEFT KNEE: Primary | ICD-10-CM

## 2020-10-23 NOTE — TELEPHONE ENCOUNTER
Pt of     Pt calling and needs his orthopedic referral for his left knee.It is still the same.It feels like it is going to pop out of place again.He denies ED care,as asked if he needed to go there.     He states he needs his knee drained.Not worse but the same from last office visit 8.26.2020.    Asking to get into an orthopedic.Updated if s/s worsen he needs to go to ED.      Please advise. He is aware PCP is out today.      Ariadna Muñoz RN

## 2020-10-23 NOTE — TELEPHONE ENCOUNTER
Put an [facility]opedic referral like at [facility] if that is okay with him and then send it to me and I will sign

## 2020-11-03 ENCOUNTER — MEDICAL CORRESPONDENCE (OUTPATIENT)
Dept: MRI IMAGING | Facility: HOSPITAL | Age: 30
End: 2020-11-03

## 2021-03-13 ENCOUNTER — HOSPITAL ENCOUNTER (EMERGENCY)
Facility: HOSPITAL | Age: 31
Discharge: HOME | End: 2021-03-13
Attending: NURSE PRACTITIONER | Admitting: NURSE PRACTITIONER
Payer: COMMERCIAL

## 2021-03-13 VITALS
WEIGHT: 200 LBS | DIASTOLIC BLOOD PRESSURE: 109 MMHG | SYSTOLIC BLOOD PRESSURE: 159 MMHG | HEART RATE: 61 BPM | OXYGEN SATURATION: 98 % | RESPIRATION RATE: 18 BRPM | BODY MASS INDEX: 27.89 KG/M2 | TEMPERATURE: 97.7 F

## 2021-03-13 DIAGNOSIS — K04.7 DENTAL ABSCESS: Primary | ICD-10-CM

## 2021-03-13 DIAGNOSIS — K08.89 PAIN, DENTAL: ICD-10-CM

## 2021-03-13 PROCEDURE — 250N000013 HC RX MED GY IP 250 OP 250 PS 637: Performed by: NURSE PRACTITIONER

## 2021-03-13 PROCEDURE — 99214 OFFICE O/P EST MOD 30 MIN: CPT | Performed by: NURSE PRACTITIONER

## 2021-03-13 PROCEDURE — G0463 HOSPITAL OUTPT CLINIC VISIT: HCPCS

## 2021-03-13 RX ORDER — CLINDAMYCIN HCL 150 MG
450 CAPSULE ORAL 3 TIMES DAILY
Qty: 63 CAPSULE | Refills: 0 | Status: SHIPPED | OUTPATIENT
Start: 2021-03-13 | End: 2021-03-20

## 2021-03-13 RX ORDER — CLINDAMYCIN HCL 150 MG
450 CAPSULE ORAL ONCE
Status: COMPLETED | OUTPATIENT
Start: 2021-03-13 | End: 2021-03-13

## 2021-03-13 RX ORDER — HYDROCODONE BITARTRATE AND ACETAMINOPHEN 5; 325 MG/1; MG/1
1 TABLET ORAL ONCE
Status: COMPLETED | OUTPATIENT
Start: 2021-03-13 | End: 2021-03-13

## 2021-03-13 RX ORDER — CHLORHEXIDINE GLUCONATE ORAL RINSE 1.2 MG/ML
15 SOLUTION DENTAL 2 TIMES DAILY
Qty: 150 ML | Refills: 0 | Status: SHIPPED | OUTPATIENT
Start: 2021-03-13 | End: 2021-03-18

## 2021-03-13 RX ADMIN — CLINDAMYCIN HYDROCHLORIDE 450 MG: 150 CAPSULE ORAL at 19:57

## 2021-03-13 RX ADMIN — HYDROCODONE BITARTRATE AND ACETAMINOPHEN 1 TABLET: 5; 325 TABLET ORAL at 19:57

## 2021-03-13 ASSESSMENT — ENCOUNTER SYMPTOMS
FEVER: 0
TROUBLE SWALLOWING: 0
CHILLS: 0
DIARRHEA: 0
SHORTNESS OF BREATH: 0
NAUSEA: 0
VOMITING: 0
PSYCHIATRIC NEGATIVE: 1

## 2021-03-14 NOTE — ED TRIAGE NOTES
Pt presents with his girlfriend and has left lower dental pain since 2 days ago due to several broken teeth. Swelling noted to left lower cheek.

## 2021-03-14 NOTE — ED PROVIDER NOTES
"  History     Chief Complaint   Patient presents with     Dental Pain     HPI  Eddi Trejo is a 31 year old male who presents to urgent care today (accompanied by significant other) for complaints of dental pain with possible abscess.  Onset 2 days ago.  Staying hydrated, denies difficulty swallowing.   Denies fever, chills, nausea, vomiting, diarrhea, SOB or chest pain.  Ibuprofen 600 mg and APAP 650 mg at 1700 this afternoon.  Patient saw dentist this past week and follows up with the oral surgeon on Monday for tooth extraction.  Patient states, \"Dentist told me if I start experiencing dental pain before appointment on Monday to go in.\"  No other concerns.      Allergies:  Allergies   Allergen Reactions     Penicillins      Patient denies difficulty breathing with reaction. He reports a mild reaction and has previously tolerated keflex without difficulty.        Problem List:    There are no active problems to display for this patient.       Past Medical History:    Past Medical History:   Diagnosis Date     Bronchitis 02     Headache 4/28/10     Open wound of forehead 4/28/10     Otitis 02     Schizophrenia (H) 4/28/10     URI (upper respiratory infection) 95       Past Surgical History:    Past Surgical History:   Procedure Laterality Date     ARTHROSCOPY KNEE Left 11/10/2017    Procedure: ARTHROSCOPY KNEE;  Left Knee Arthroscopy with Menical Repair;  Surgeon: Jp Pritchard DO;  Location: [facility]     ARTHROSCOPY KNEE Left 2019    Procedure: LEFT KNEE ARTHROSCOPY;  Surgeon: Noel Moralez MD;  Location: [facility]      CREATE EARDRUM OPENING,GEN ANESTH      PE tubes as child       Family History:    Family History   Problem Relation Age of Onset     Asthma Mother      Colon Cancer Maternal Grandfather          at 58     Breast Cancer Maternal Grandmother      Diabetes Paternal Grandmother      Diabetes Paternal Aunt      Diabetes Maternal Aunt      C.A.D. No family hx of      " Hypertension No family hx of        Social History:  Marital Status:  Single [1]  Social History     Tobacco Use     Smoking status: Never Smoker     Smokeless tobacco: Current User     Types: Chew     Tobacco comment: pt denied quit plan 4/16/19   Substance Use Topics     Alcohol use: Yes     Drug use: Yes     Types: Marijuana        Medications:    acetaminophen (TYLENOL) 500 MG tablet  chlorhexidine (PERIDEX) 0.12 % solution  clindamycin (CLEOCIN) 150 MG capsule  gabapentin (NEURONTIN) 800 MG tablet      Review of Systems   Constitutional: Negative for chills and fever.   HENT: Positive for dental problem. Negative for ear pain and trouble swallowing.    Respiratory: Negative for shortness of breath.    Cardiovascular: Negative for chest pain.   Gastrointestinal: Negative for diarrhea, nausea and vomiting.   Psychiatric/Behavioral: Negative.      Physical Exam   BP: (!) 165/106  Pulse: 61  Temp: 97.7  F (36.5  C)  Resp: 18  Weight: 90.7 kg (200 lb)  SpO2: 98 %    Physical Exam  Vitals signs and nursing note reviewed.   HENT:      Mouth/Throat:      Lips: Pink.      Mouth: Mucous membranes are moist.      Dentition: Abnormal dentition. Dental tenderness, gingival swelling, dental caries and dental abscesses present.      Pharynx: Oropharynx is clear. Uvula midline.        Comments: Poor dentition.  Multiple missing or cracked teeth.  Tooth #20 has large amount of tooth decay with mild gingival enlargement around tooth and surrounding teeth.  Tender to the touch.  Able to open and close mouth without difficulty.  No facial swelling noted.    Cardiovascular:      Rate and Rhythm: Normal rate and regular rhythm.      Pulses: Normal pulses.      Heart sounds: Normal heart sounds.   Pulmonary:      Effort: Pulmonary effort is normal.      Breath sounds: Normal breath sounds.   Neurological:      Mental Status: He is alert.   Psychiatric:         Mood and Affect: Mood normal.       ED Course     No results found for  this or any previous visit (from the past 24 hour(s)).    Medications   HYDROcodone-acetaminophen (NORCO) 5-325 MG per tablet 1 tablet (1 tablet Oral Given 3/13/21 1957)   clindamycin (CLEOCIN) capsule 450 mg (450 mg Oral Given 3/13/21 1957)       Assessments & Plan (with Medical Decision Making)     I have reviewed the nursing notes.    I have reviewed the findings, diagnosis, plan and need for follow up with the patient.  (K04.7) Dental abscess  (primary encounter diagnosis)  K08.89) Pain, dental  Plan:   Clindamycin as ordered.  - Take entire course of antibiotic even if you start to feel better.  - Antibiotics can cause stomach upset including nausea and diarrhea. Read your bottle or ask the pharmacist if antibiotic can be taken with food to help prevent nausea. If you have symptoms of diarrhea you can take an over-the-counter probiotic and/or increase foods with probiotics such as yogurt, Dallin, sauerkraut.    Peridex Mouth wash.    Ibuprofen and tylenol for discomfort.    Call 911 if any of these occur:    Abnormal drowsiness    Headache or stiff neck    Weakness or fainting    Trouble swallowing, breathing, or opening your mouth    Swollen eyelids    Call your healthcare provider right away if any of these occur:    Your face gets more swollen or red    Pain gets worse or spreads to your neck    Fever of 100.4 F (38.0 C) or higher, or as directed by your provider    Pus drains from the tooth    Follow up with dentist on Monday, return to urgent care/ED with any worsening in symptoms or additional concerns.     Discharge Medication List as of 3/13/2021  8:00 PM      START taking these medications    Details   chlorhexidine (PERIDEX) 0.12 % solution Swish and spit 15 mLs in mouth 2 times daily for 5 days, Disp-150 mL, R-0, E-Prescribe      clindamycin (CLEOCIN) 150 MG capsule Take 3 capsules (450 mg) by mouth 3 times daily for 7 days, Disp-63 capsule, R-0, E-Prescribe           Final diagnoses:   Dental  abscess   Pain, dental     3/13/2021   [facility]     Lucia Alaniz NP  03/13/21 2042

## 2021-03-14 NOTE — DISCHARGE INSTRUCTIONS
Clindamycin as ordered.  - Take entire course of antibiotic even if you start to feel better.  - Antibiotics can cause stomach upset including nausea and diarrhea. Read your bottle or ask the pharmacist if antibiotic can be taken with food to help prevent nausea. If you have symptoms of diarrhea you can take an over-the-counter probiotic and/or increase foods with probiotics such as yogurt, Dallin, sauerkraut.    Peridex Mouth wash.    Ibuprofen and tylenol for discomfort.    Call 911 if any of these occur:  Abnormal drowsiness  Headache or stiff neck  Weakness or fainting  Trouble swallowing, breathing, or opening your mouth  Swollen eyelids    Call your healthcare provider right away if any of these occur:  Your face gets more swollen or red  Pain gets worse or spreads to your neck  Fever of 100.4 F (38.0 C) or higher, or as directed by your provider  Pus drains from the tooth    Follow up with dentist on Monday, return to urgent care/ED with any worsening in symptoms or additional concerns.

## 2021-04-16 ENCOUNTER — HOSPITAL ENCOUNTER (EMERGENCY)
Facility: HOSPITAL | Age: 31
Discharge: HOME | End: 2021-04-16
Attending: PHYSICIAN ASSISTANT | Admitting: PHYSICIAN ASSISTANT
Payer: COMMERCIAL

## 2021-04-16 VITALS
WEIGHT: 228.18 LBS | OXYGEN SATURATION: 97 % | BODY MASS INDEX: 31.82 KG/M2 | RESPIRATION RATE: 18 BRPM | SYSTOLIC BLOOD PRESSURE: 130 MMHG | HEART RATE: 80 BPM | TEMPERATURE: 97.2 F | DIASTOLIC BLOOD PRESSURE: 76 MMHG

## 2021-04-16 DIAGNOSIS — S01.01XA SCALP LACERATION, INITIAL ENCOUNTER: ICD-10-CM

## 2021-04-16 PROCEDURE — 12001 RPR S/N/AX/GEN/TRNK 2.5CM/<: CPT | Performed by: PHYSICIAN ASSISTANT

## 2021-04-16 PROCEDURE — 99282 EMERGENCY DEPT VISIT SF MDM: CPT | Mod: 25

## 2021-04-16 PROCEDURE — 272N000047 HC ADHESIVE DERMABOND SKIN

## 2021-04-16 PROCEDURE — 12001 RPR S/N/AX/GEN/TRNK 2.5CM/<: CPT

## 2021-04-16 NOTE — ED PROVIDER NOTES
History     Chief Complaint   Patient presents with     Head Injury     HPI  Eddi Trejo is a 31 year old male who presents with complaints of laceration of scalp after striking his head on a nail stuck in a tree while bending over his fire.  Denies LOC.  Patient is UTD on tetanus 2020.    Allergies:  Allergies   Allergen Reactions     Penicillins      Patient denies difficulty breathing with reaction. He reports a mild reaction and has previously tolerated keflex without difficulty.        Problem List:    There are no active problems to display for this patient.       Past Medical History:    Past Medical History:   Diagnosis Date     Bronchitis 4/25/02     Headache 4/28/10     Open wound of forehead 4/28/10     Otitis 4/18/02     Schizophrenia (H) 4/28/10     URI (upper respiratory infection) 12/24/95       Social History:  Marital Status:  Single [1]  Social History     Tobacco Use     Smoking status: Never Smoker     Smokeless tobacco: Current User     Types: Chew     Tobacco comment: pt denied quit plan 4/16/19   Substance Use Topics     Alcohol use: Yes     Drug use: Yes     Types: Marijuana        Medications:    acetaminophen (TYLENOL) 500 MG tablet  gabapentin (NEURONTIN) 800 MG tablet          Review of Systems :  Constitutional: Negative for fever.   Skin: Positive for laceration.    Neuro: Neg LOC    Physical Exam   BP: 130/76  Pulse: 80  Temp: 97.2  F (36.2  C)  Resp: 18  Weight: 103.5 kg (228 lb 2.8 oz)  SpO2: 97 %    Physical Exam   Constitutional: Well-developed and well-nourished.   Head: Normocephalic and atraumatic.   Eyes: Conjunctivae and EOM are normal. Pupils are equal, round, and reactive to light.   Neck: Normal range of motion.   Pulmonary/Chest: Effort normal  Skin: 1.2 cm linear laceration of the left side of the scalp.   Neuro: Gait normal.  Cranial nerves grossly normal.        ED Course               No results found for this or any previous visit (from the past 24  hour(s)).    Medications - No data to display    Assessments & Plan (with Medical Decision Making)     I have reviewed the nursing notes.    I have reviewed the findings, diagnosis, plan and need for follow up with the patient.  Analgesia obtained with 1.5 mL 2% lidocaine with epi.  Wound was thoroughly irrigated and probed.  No palpable damage to the skull was identified.  Simple closure obtained with Dermabond.  Patient tolerated well.      Dicussed wound care.  Discussed symptoms of infection that would require recheck in clinic or ED.         Medication List      There are no discharge medications for this visit.         Final diagnoses:   Scalp laceration, initial encounter       DIONICIO Isbell on 4/16/2021 at 11:29 AM   4/16/2021   [facility]         Levy Pederson PA  04/16/21 1134

## 2021-04-16 NOTE — ED NOTES
"Pt presents to ED with c/o head laceration.  Pt was camping earlier today and hit his head on a nail in a board.  Pt denies falling, reports eyesight went \"black\"  Denies LOC.  Pt denies vision or balances changes.    Head cleaned with NS, 1.5 cm laceration noted on pt's head. Bleeding controlled at this time.   "

## 2021-04-16 NOTE — ED TRIAGE NOTES
"Presents with reports of a head injury - states he hit his head on a tree and a nail penetrated his head. States he stumbled backwards, and \"vision went black for a few seconds.\" Denies vision changes at this time. GCS 15. Reports 6/10 headache. Tdap in 2020. Bleeding is controlled.  "

## 2021-05-19 ENCOUNTER — HOSPITAL ENCOUNTER (EMERGENCY)
Facility: HOSPITAL | Age: 31
Discharge: HOME | End: 2021-05-19
Attending: PHYSICIAN ASSISTANT | Admitting: PHYSICIAN ASSISTANT
Payer: COMMERCIAL

## 2021-05-19 VITALS
HEART RATE: 98 BPM | DIASTOLIC BLOOD PRESSURE: 114 MMHG | OXYGEN SATURATION: 97 % | RESPIRATION RATE: 18 BRPM | SYSTOLIC BLOOD PRESSURE: 169 MMHG | TEMPERATURE: 98 F

## 2021-05-19 DIAGNOSIS — K08.89 PAIN, DENTAL: ICD-10-CM

## 2021-05-19 PROCEDURE — 64400 NJX AA&/STRD TRIGEMINAL NRV: CPT | Performed by: PHYSICIAN ASSISTANT

## 2021-05-19 PROCEDURE — 999N000104 HC STATISTIC NO CHARGE

## 2021-05-19 PROCEDURE — 64400 NJX AA&/STRD TRIGEMINAL NRV: CPT

## 2021-05-19 RX ORDER — TRAZODONE HYDROCHLORIDE 50 MG/1
50 TABLET, FILM COATED ORAL AT BEDTIME
COMMUNITY
End: 2021-08-15

## 2021-05-19 RX ORDER — AMOXICILLIN 500 MG/1
1000 CAPSULE ORAL 2 TIMES DAILY
Qty: 28 CAPSULE | Refills: 0 | Status: SHIPPED | OUTPATIENT
Start: 2021-05-19 | End: 2021-05-26

## 2021-05-19 RX ORDER — ESCITALOPRAM OXALATE 20 MG/1
20 TABLET ORAL DAILY
COMMUNITY

## 2021-05-19 RX ORDER — HYDROCODONE BITARTRATE AND ACETAMINOPHEN 5; 325 MG/1; MG/1
1 TABLET ORAL 3 TIMES DAILY PRN
Qty: 6 TABLET | Refills: 0 | Status: SHIPPED | OUTPATIENT
Start: 2021-05-19 | End: 2021-08-12

## 2021-05-19 RX ORDER — ATOMOXETINE 10 MG/1
10 CAPSULE ORAL DAILY
COMMUNITY

## 2021-05-19 ASSESSMENT — ENCOUNTER SYMPTOMS
FACIAL SWELLING: 0
FEVER: 0
TROUBLE SWALLOWING: 0
APPETITE CHANGE: 0
NECK STIFFNESS: 0
CHILLS: 0
NECK PAIN: 0

## 2021-05-19 NOTE — ED TRIAGE NOTES
Pt presents today with c/o dental infection. Started again yesterday night. PT is calling a dentist tomorrow to try to get into one, has been in contact.

## 2021-05-19 NOTE — ED NOTES
Pt called asking why his medication were not received by pharmacy. Called [facility] and was told they just received the orders 4 minutes ago. Pt should be able to  medications now.

## 2021-05-19 NOTE — DISCHARGE INSTRUCTIONS
Pain medications and antibiotics as prescribed.    Please follow-up with your dentist in the next 24 to 48 hours.    You have advised me that you have tolerated amoxicillin multiple times in the past and are not allergic to this medication.  You were recently on clindamycin and this medication would certainly be the most important at this time.  Please return here for any other questions or concerns.

## 2021-05-19 NOTE — ED PROVIDER NOTES
"  History     Chief Complaint   Patient presents with     Dental Pain     c/o dental pain, pt states \"go fuck youyrself\", security called and pt persist with verbal abuse and aggressive behavior.      The history is provided by the patient.     Eddi Trejo is a 31 year old male who presented to the urgent care ambulatory for evaluation of a 2-day history of left lower dental pain.  Denies any trauma.  Denies any fevers.  Denies any difficulty swallowing.  Denies any history of immunosuppression.  He does carry history of poor dentition.  He does have a local dentist.    Allergies:  Allergies   Allergen Reactions     Penicillins      Patient denies difficulty breathing with reaction. He reports a mild reaction and has previously tolerated keflex without difficulty.        Problem List:    There are no active problems to display for this patient.       Past Medical History:    Past Medical History:   Diagnosis Date     Bronchitis 02     Headache 4/28/10     Open wound of forehead 4/28/10     Otitis 02     Schizophrenia (H) 4/28/10     URI (upper respiratory infection) 95       Past Surgical History:    Past Surgical History:   Procedure Laterality Date     ARTHROSCOPY KNEE Left 11/10/2017    Procedure: ARTHROSCOPY KNEE;  Left Knee Arthroscopy with Menical Repair;  Surgeon: Jp Pritchard DO;  Location: [facility]     ARTHROSCOPY KNEE Left 2019    Procedure: LEFT KNEE ARTHROSCOPY;  Surgeon: Noel Moralez MD;  Location: [facility]      CREATE EARDRUM OPENING,GEN ANESTH      PE tubes as child       Family History:    Family History   Problem Relation Age of Onset     Asthma Mother      Colon Cancer Maternal Grandfather          at 58     Breast Cancer Maternal Grandmother      Diabetes Paternal Grandmother      Diabetes Paternal Aunt      Diabetes Maternal Aunt      C.A.D. No family hx of      Hypertension No family hx of        Social History:  Marital Status:  Single [1]  Social " History     Tobacco Use     Smoking status: Never Smoker     Smokeless tobacco: Current User     Types: Chew     Tobacco comment: pt denied quit plan 4/16/19   Substance Use Topics     Alcohol use: Yes     Drug use: Yes     Types: Marijuana        Medications:    amoxicillin (AMOXIL) 500 MG capsule  atomoxetine (STRATTERA) 10 MG capsule  escitalopram (LEXAPRO) 20 MG tablet  gabapentin (NEURONTIN) 800 MG tablet  HYDROcodone-acetaminophen (NORCO) 5-325 MG tablet  traZODone (DESYREL) 50 MG tablet  acetaminophen (TYLENOL) 500 MG tablet          Review of Systems   Constitutional: Negative for appetite change, chills and fever.   HENT: Positive for dental problem. Negative for drooling, facial swelling and trouble swallowing.    Musculoskeletal: Negative for neck pain and neck stiffness.   Allergic/Immunologic: Negative for immunocompromised state.       Physical Exam   BP: (!) 169/114  Pulse: 98  Temp: 98  F (36.7  C)  Resp: 18  SpO2: 97 %      Physical Exam  Vitals signs and nursing note reviewed.   Constitutional:       General: He is not in acute distress.     Appearance: Normal appearance. He is not ill-appearing, toxic-appearing or diaphoretic.   HENT:      Mouth/Throat:      Comments: No evidence of submental, submandibular, or sublingual swelling.  He has normal speech.  He has no evidence of trismus.  Examination reveals generalized poor dentition with multiple caries and fractured teeth without discrete evidence of palpable abscess.  He complains mostly of pain at tooth #19.  Cardiovascular:      Rate and Rhythm: Normal rate and regular rhythm.   Skin:     General: Skin is warm and dry.      Capillary Refill: Capillary refill takes less than 2 seconds.   Neurological:      General: No focal deficit present.      Mental Status: He is alert and oriented to person, place, and time.         ED Course        [facility]    Dental Block    Date/Time: 5/19/2021 6:15 PM  Performed by: Vicki Rivera  ANTHONY  Authorized by: Vicki Rivera PA-C       INDICATIONS     Indications: dental pain      LOCATION     Block type:  Inferior alveolar    Laterality:  Left    PROCEDURE DETAILS     Syringe type:  Aspirating dental syringe    Needle gauge:  27 G    Anesthetic injected:  Bupivacaine 0.5% WITH epi    Injection procedure:  Anatomic landmarks identified, introduced needle, incremental injection, anatomic landmarks palpated and negative aspiration for blood    POST PROCEDURE DETAILS      Outcome:  Pain improved    PROCEDURE   Patient Tolerance:  Patient tolerated the procedure well with no immediate complications  Describe Procedure: Treatments options were discussed.  With verbal consent the patient underwent left inferior alveolar dental block with 1.8 mL of 0.5% bupivacaine with epinephrine.  Excellent anesthetic results.  Pain on discharge was a 3.  Antibiotics and pain medications as well.                 Critical Care time:  none               No results found for this or any previous visit (from the past 24 hour(s)).    Medications - No data to display    Assessments & Plan (with Medical Decision Making)   31-year-old male who presented to the urgent care for evaluation of approximate 48 hours of left-sided mandibular dental pain.  Multiple caries with fractured teeth.  There is no discrete abscess.  He has no red flag signs or symptoms.  He has no evidence of trismus or facial swelling.  Discussed treatment options and the patient elected to undergo dental block.  See above.  Started on pain medications and antibiotics.  The patient tells me he has tolerated amoxicillin multiple times in the past.  Recently on clindamycin so this would be the best option.  Follow-up with the dentist in the next 1 to 2 days.  Return here for any new symptoms, worsening symptoms, or other concerns.  The patient voiced complete understanding and was happy and agreeable.    This document was prepared using a combination of typing  and voice generated software.  While every attempt was made for accuracy, spelling and grammatical errors may exist.    I have reviewed the nursing notes.    I have reviewed the findings, diagnosis, plan and need for follow up with the patient.       Discharge Medication List as of 5/19/2021  5:46 PM      START taking these medications    Details   amoxicillin (AMOXIL) 500 MG capsule Take 2 capsules (1,000 mg) by mouth 2 times daily for 7 days, Disp-28 capsule, R-0, E-Prescribe      HYDROcodone-acetaminophen (NORCO) 5-325 MG tablet Take 1 tablet by mouth 3 times daily as needed for pain, Disp-6 tablet, R-0, E-Prescribe             Final diagnoses:   Pain, dental       5/19/2021   [facility]     Vicki Rivera PA-C  05/19/21 5042

## 2021-06-08 ENCOUNTER — APPOINTMENT (OUTPATIENT)
Dept: GENERAL RADIOLOGY | Facility: HOSPITAL | Age: 31
End: 2021-06-08
Attending: NURSE PRACTITIONER
Payer: COMMERCIAL

## 2021-06-08 ENCOUNTER — HOSPITAL ENCOUNTER (EMERGENCY)
Facility: HOSPITAL | Age: 31
Discharge: HOME | End: 2021-06-08
Attending: NURSE PRACTITIONER | Admitting: NURSE PRACTITIONER
Payer: COMMERCIAL

## 2021-06-08 VITALS
HEART RATE: 81 BPM | RESPIRATION RATE: 16 BRPM | TEMPERATURE: 98.4 F | SYSTOLIC BLOOD PRESSURE: 120 MMHG | OXYGEN SATURATION: 98 % | DIASTOLIC BLOOD PRESSURE: 76 MMHG

## 2021-06-08 DIAGNOSIS — M79.631 PAIN OF RIGHT FOREARM: Primary | ICD-10-CM

## 2021-06-08 PROCEDURE — G0463 HOSPITAL OUTPT CLINIC VISIT: HCPCS | Mod: 25

## 2021-06-08 PROCEDURE — 73110 X-RAY EXAM OF WRIST: CPT | Mod: RT

## 2021-06-08 PROCEDURE — 250N000011 HC RX IP 250 OP 636: Performed by: NURSE PRACTITIONER

## 2021-06-08 PROCEDURE — 99214 OFFICE O/P EST MOD 30 MIN: CPT | Performed by: NURSE PRACTITIONER

## 2021-06-08 PROCEDURE — 96372 THER/PROPH/DIAG INJ SC/IM: CPT | Performed by: NURSE PRACTITIONER

## 2021-06-08 PROCEDURE — 250N000013 HC RX MED GY IP 250 OP 250 PS 637: Performed by: NURSE PRACTITIONER

## 2021-06-08 RX ORDER — CEPHALEXIN 500 MG/1
500 CAPSULE ORAL ONCE
Status: COMPLETED | OUTPATIENT
Start: 2021-06-08 | End: 2021-06-08

## 2021-06-08 RX ORDER — KETOROLAC TROMETHAMINE 30 MG/ML
30 INJECTION, SOLUTION INTRAMUSCULAR; INTRAVENOUS ONCE
Status: COMPLETED | OUTPATIENT
Start: 2021-06-08 | End: 2021-06-08

## 2021-06-08 RX ORDER — CEPHALEXIN 500 MG/1
500 CAPSULE ORAL 4 TIMES DAILY
Qty: 28 CAPSULE | Refills: 0 | Status: SHIPPED | OUTPATIENT
Start: 2021-06-08 | End: 2021-06-15

## 2021-06-08 RX ADMIN — CEPHALEXIN 500 MG: 500 CAPSULE ORAL at 19:47

## 2021-06-08 RX ADMIN — KETOROLAC TROMETHAMINE 30 MG: 30 INJECTION, SOLUTION INTRAMUSCULAR; INTRAVENOUS at 19:46

## 2021-06-08 ASSESSMENT — ENCOUNTER SYMPTOMS
PSYCHIATRIC NEGATIVE: 1
CHILLS: 0
VOMITING: 0
FEVER: 0
DIARRHEA: 0
NAUSEA: 0
SHORTNESS OF BREATH: 0

## 2021-06-08 NOTE — ED TRIAGE NOTES
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

## 2021-06-08 NOTE — Clinical Note
Eddi Trejo was seen and treated in our emergency department on 6/8/2021.  He may return to work on 06/11/2021.       If you have any questions or concerns, please don't hesitate to call.      Lucia Alaniz, NP

## 2021-06-09 ASSESSMENT — ENCOUNTER SYMPTOMS
MYALGIAS: 1
ARTHRALGIAS: 1
WOUND: 1

## 2021-06-09 NOTE — ED PROVIDER NOTES
"  History     Chief Complaint   Patient presents with     Hand Pain     c/o rt hand and wrist pain, notes \"I got a dog bite a year ago\". scratches noted and notes \"they are from work\". denies recent injury     HPI  Eddi Trejo is a 31 year old male who presents to urgent care (ambulatory) for complaints of right forearm pain.  Patient states got bite by a dog 1 year ago, healed up fine.  Pain and swelling to right forearm started 2 days ago.  Previous surgery to area from dog bite.  No known trauma/injury recently.  Denies fever, chills, nausea, vomiting, diarrhea, SOB or chest pain.  Denies history of blood clots, not currently on any blood thinners.  No other concerns.     Allergies:  Allergies   Allergen Reactions     Penicillins      Patient denies difficulty breathing with reaction. He reports a mild reaction and has previously tolerated keflex without difficulty.        Problem List:    There are no active problems to display for this patient.       Past Medical History:    Past Medical History:   Diagnosis Date     Bronchitis 02     Headache 4/28/10     Open wound of forehead 4/28/10     Otitis 02     Schizophrenia (H) 4/28/10     URI (upper respiratory infection) 95       Past Surgical History:    Past Surgical History:   Procedure Laterality Date     ARTHROSCOPY KNEE Left 11/10/2017    Procedure: ARTHROSCOPY KNEE;  Left Knee Arthroscopy with Menical Repair;  Surgeon: Jp Pritchard DO;  Location: [facility]     ARTHROSCOPY KNEE Left 2019    Procedure: LEFT KNEE ARTHROSCOPY;  Surgeon: Noel Moralez MD;  Location: [facility]      CREATE EARDRUM OPENING,GEN ANESTH      PE tubes as child       Family History:    Family History   Problem Relation Age of Onset     Asthma Mother      Colon Cancer Maternal Grandfather          at 58     Breast Cancer Maternal Grandmother      Diabetes Paternal Grandmother      Diabetes Paternal Aunt      Diabetes Maternal Aunt      C.A.D. No family " hx of      Hypertension No family hx of        Social History:  Marital Status:  Single [1]  Social History     Tobacco Use     Smoking status: Never Smoker     Smokeless tobacco: Current User     Types: Chew     Tobacco comment: pt denied quit plan 4/16/19   Substance Use Topics     Alcohol use: Yes     Drug use: Yes     Types: Marijuana        Medications:    atomoxetine (STRATTERA) 10 MG capsule  cephALEXin (KEFLEX) 500 MG capsule  escitalopram (LEXAPRO) 20 MG tablet  gabapentin (NEURONTIN) 800 MG tablet  traZODone (DESYREL) 50 MG tablet  acetaminophen (TYLENOL) 500 MG tablet  HYDROcodone-acetaminophen (NORCO) 5-325 MG tablet      Review of Systems   Constitutional: Negative for chills and fever.   Respiratory: Negative for shortness of breath.    Cardiovascular: Negative for chest pain.   Gastrointestinal: Negative for diarrhea, nausea and vomiting.   Musculoskeletal: Positive for arthralgias and myalgias. Negative for gait problem.   Skin: Positive for wound.        Abrasions to right forearm accompanied by redness and swelling   Psychiatric/Behavioral: Negative.      Physical Exam   BP: 120/76  Pulse: 81  Temp: 98.4  F (36.9  C)  Resp: 16  SpO2: 98 %    Physical Exam  Vitals signs and nursing note reviewed.   Constitutional:       General: He is not in acute distress.     Appearance: He is not ill-appearing.   Neck:      Musculoskeletal: Normal range of motion and neck supple.   Cardiovascular:      Rate and Rhythm: Normal rate and regular rhythm.      Pulses: Normal pulses.      Heart sounds: Normal heart sounds.   Pulmonary:      Effort: Pulmonary effort is normal.      Breath sounds: Normal breath sounds.   Musculoskeletal: Normal range of motion.   Skin:     General: Skin is warm and dry.      Capillary Refill: Capillary refill takes less than 2 seconds.      Findings: Abrasion and erythema present.      Comments: Abrasions to right forearm accompanied by redness and swelling.     Neurological:      Mental  Status: He is alert.   Psychiatric:         Mood and Affect: Mood normal.             ED Course     Results for orders placed or performed during the hospital encounter of 06/08/21 (from the past 24 hour(s))   XR Wrist Right G/E 3 Views    Narrative    PROCEDURE:  XR WRIST RT G/E 3 VW    HISTORY: right wrist pain, unknown injury.    COMPARISON:  None.    TECHNIQUE:  4 views right wrist.    FINDINGS:  No fracture or dislocation is identified. The joint spaces  are preserved. No foreign body is seen.       Impression    IMPRESSION: No acute fracture.      ERICA GODINEZ MD       Medications   ketorolac (TORADOL) injection 30 mg (30 mg Intramuscular Given 6/8/21 1946)   cephALEXin (KEFLEX) capsule 500 mg (500 mg Oral Given 6/8/21 1947)       Assessments & Plan (with Medical Decision Making)     I have reviewed the nursing notes.    I have reviewed the findings, diagnosis, plan and need for follow up with the patient.  (M79.861) Pain of right forearm  (primary encounter diagnosis)  Plan:   Reviewed patient with the ED provider who came and saw patient. DVT to right upper extremity highly unlikely, patient to monitor at home for signs and return as needed.   Patient has pain to right forearm and wrist placed in short arm splint for comfort. XR Negative for fracture.  Patient to take Tylenol and ibuprofen for pain. Cephalexin as ordered for possible cause of skin infection. Patient to use RICE and follow-up with PCP as soon as possible for further evaluation. Patient is in agreement with treatment plan and will follow up with PCP and return to urgent care-ED with any worsening in condition or additional concerns.    Discharge Medication List as of 6/8/2021  8:04 PM      START taking these medications    Details   cephALEXin (KEFLEX) 500 MG capsule Take 1 capsule (500 mg) by mouth 4 times daily for 7 days, Disp-28 capsule, R-0, E-Prescribe           Final diagnoses:   Pain of right forearm     6/8/2021   [facility]     Lucia Alaniz NP  06/09/21 112

## 2021-06-09 NOTE — DISCHARGE INSTRUCTIONS
Cephalexin as ordered  - Take entire course of antibiotic even if you start to feel better.  - Antibiotics can cause stomach upset including nausea and diarrhea. Read your bottle or ask the pharmacist if antibiotic can be taken with food to help prevent nausea. If you have symptoms of diarrhea you can take an over-the-counter probiotic and/or increase foods with probiotics such as yogurt, [place], sauerkraut.    Short arm splint to assist with pain/discomfort.      - Continue with conservative measures including Rest, Ice, Compression, Elevation, Tylenol and/or Ibuprofen per package instructions for discomfort  (You can alternate Tylenol (acetaminophen) with Advil (ibuprofen).  Example: Ibuprofen 8 AM, Tylenol 12 PM, ibuprofen 4 PM, Tylenol 8 PM, ibuprofen 10 PM, etc.).  Take ibuprofen with food.  - Topical anesthetic such as Biofreeze, Bengay, Icy Hot, Lidocaine, others.   - Avoid overusing or exerting which could delay healing and cause further injury  Call and schedule follow-up appointment with primary care provider.    Return to urgent care-ED with any worsening in condition or additional concerns.

## 2021-08-12 ENCOUNTER — HOSPITAL ENCOUNTER (EMERGENCY)
Facility: HOSPITAL | Age: 31
Discharge: HOME | End: 2021-08-12
Attending: INTERNAL MEDICINE | Admitting: INTERNAL MEDICINE
Payer: COMMERCIAL

## 2021-08-12 ENCOUNTER — APPOINTMENT (OUTPATIENT)
Dept: GENERAL RADIOLOGY | Facility: HOSPITAL | Age: 31
End: 2021-08-12
Attending: INTERNAL MEDICINE
Payer: COMMERCIAL

## 2021-08-12 VITALS
RESPIRATION RATE: 16 BRPM | TEMPERATURE: 97.2 F | DIASTOLIC BLOOD PRESSURE: 94 MMHG | HEART RATE: 68 BPM | SYSTOLIC BLOOD PRESSURE: 132 MMHG | OXYGEN SATURATION: 98 %

## 2021-08-12 DIAGNOSIS — L03.119 CELLULITIS OF FOOT: ICD-10-CM

## 2021-08-12 DIAGNOSIS — L84 CALLUS OF FOOT: ICD-10-CM

## 2021-08-12 DIAGNOSIS — T14.8XXA PUNCTURE WOUND: ICD-10-CM

## 2021-08-12 PROCEDURE — 73630 X-RAY EXAM OF FOOT: CPT | Mod: RT

## 2021-08-12 PROCEDURE — 99284 EMERGENCY DEPT VISIT MOD MDM: CPT | Performed by: INTERNAL MEDICINE

## 2021-08-12 PROCEDURE — 99283 EMERGENCY DEPT VISIT LOW MDM: CPT

## 2021-08-12 PROCEDURE — 250N000013 HC RX MED GY IP 250 OP 250 PS 637: Performed by: INTERNAL MEDICINE

## 2021-08-12 RX ORDER — CLINDAMYCIN HCL 150 MG
300 CAPSULE ORAL ONCE
Status: COMPLETED | OUTPATIENT
Start: 2021-08-12 | End: 2021-08-12

## 2021-08-12 RX ORDER — CLINDAMYCIN HCL 300 MG
300 CAPSULE ORAL 3 TIMES DAILY
Qty: 15 CAPSULE | Refills: 0 | Status: SHIPPED | OUTPATIENT
Start: 2021-08-12 | End: 2021-08-17

## 2021-08-12 RX ORDER — NAPROXEN 500 MG/1
500 TABLET ORAL ONCE
Status: COMPLETED | OUTPATIENT
Start: 2021-08-12 | End: 2021-08-12

## 2021-08-12 RX ORDER — METHYLPHENIDATE HYDROCHLORIDE 20 MG/1
20 TABLET ORAL DAILY
COMMUNITY
End: 2023-09-05

## 2021-08-12 RX ADMIN — CLINDAMYCIN HYDROCHLORIDE 300 MG: 150 CAPSULE ORAL at 23:26

## 2021-08-12 RX ADMIN — NAPROXEN 500 MG: 500 TABLET ORAL at 22:10

## 2021-08-12 ASSESSMENT — ENCOUNTER SYMPTOMS
FEVER: 0
NECK PAIN: 0
VOMITING: 0
SHORTNESS OF BREATH: 0
CHILLS: 0
FATIGUE: 0
COLOR CHANGE: 0
BACK PAIN: 0

## 2021-08-13 NOTE — ED PROVIDER NOTES
History     Chief Complaint   Patient presents with     Foot Pain     The history is provided by the patient.   Foot Injury  Location:  Foot  Time since incident:  2 weeks  Foot location:  R foot  Pain details:     Quality:  Aching    Severity:  Moderate    Onset quality:  Gradual    Timing:  Constant    Progression:  Worsening  Chronicity:  New  Foreign body present:  Unable to specify  Associated symptoms: no back pain, no fatigue, no fever and no neck pain          Allergies:  Allergies   Allergen Reactions     Penicillins      Patient denies difficulty breathing with reaction. He reports a mild reaction and has previously tolerated keflex without difficulty.        Problem List:    There are no problems to display for this patient.       Past Medical History:    Past Medical History:   Diagnosis Date     Bronchitis 02     Headache 4/28/10     Open wound of forehead 4/28/10     Otitis 02     Schizophrenia (H) 4/28/10     URI (upper respiratory infection) 95       Past Surgical History:    Past Surgical History:   Procedure Laterality Date     ARTHROSCOPY KNEE Left 11/10/2017    Procedure: ARTHROSCOPY KNEE;  Left Knee Arthroscopy with Menical Repair;  Surgeon: Jp Pritchard DO;  Location: [facility]     ARTHROSCOPY KNEE Left 2019    Procedure: LEFT KNEE ARTHROSCOPY;  Surgeon: Noel Moralez MD;  Location: [facility]     [facility] CREATE EARDRUM OPENING,GEN ANESTH      PE tubes as child       Family History:    Family History   Problem Relation Age of Onset     Asthma Mother      Colon Cancer Maternal Grandfather          at 58     Breast Cancer Maternal Grandmother      Diabetes Paternal Grandmother      Diabetes Paternal Aunt      Diabetes Maternal Aunt      C.A.D. No family hx of      Hypertension No family hx of        Social History:  Marital Status:  Single [1]  Social History     Tobacco Use     Smoking status: Never Smoker     Smokeless tobacco: Current User     Types: Chew     Tobacco  comment: pt denied quit plan 4/16/19   Substance Use Topics     Alcohol use: Yes     Drug use: Yes     Types: Marijuana        Medications:    atomoxetine (STRATTERA) 10 MG capsule  clindamycin (CLEOCIN) 300 MG capsule  escitalopram (LEXAPRO) 20 MG tablet  gabapentin (NEURONTIN) 800 MG tablet  methylphenidate (RITALIN) 20 MG tablet  salicylic acid (MEDIPLAST) 40 % miscellaneous  traZODone (DESYREL) 50 MG tablet          Review of Systems   Constitutional: Negative for chills, fatigue and fever.   Respiratory: Negative for shortness of breath.    Cardiovascular: Negative for leg swelling.   Gastrointestinal: Negative for vomiting.   Musculoskeletal: Negative for back pain and neck pain.   Skin: Negative for color change and pallor.       Physical Exam   BP: 127/88  Pulse: 67  Temp: 97.2  F (36.2  C)  Resp: 16  SpO2: 98 %      Physical Exam  Constitutional:       General: He is not in acute distress.     Appearance: He is not diaphoretic.   HENT:      Head: Atraumatic.   Eyes:      General: No scleral icterus.     Pupils: Pupils are equal, round, and reactive to light.   Cardiovascular:      Rate and Rhythm: Normal rate.   Abdominal:      Tenderness: There is no abdominal tenderness.   Musculoskeletal:      Right foot: Tenderness present.      Comments: Right middle foot sole puncture site already healed, red stricks around it,  with sclerotic hypertrophic skin changes around the old puncture site   Skin:     General: Skin is warm.      Findings: No rash.         ED Course        Procedures                  No results found for this or any previous visit (from the past 24 hour(s)).    Medications   naproxen (NAPROSYN) tablet 500 mg (500 mg Oral Given 8/12/21 2210)   clindamycin (CLEOCIN) capsule 300 mg (300 mg Oral Given 8/12/21 2326)       Assessments & Plan (with Medical Decision Making)   punture wound 2 wk ago, came for increasing pain  Xray foot negative    Callus formation + early celllulitis  Clindamycin  started  Follow-up PCP  I have reviewed the nursing notes.    I have reviewed the findings, diagnosis, plan and need for follow up with the patient.      Discharge Medication List as of 8/12/2021 11:23 PM      START taking these medications    Details   clindamycin (CLEOCIN) 300 MG capsule Take 1 capsule (300 mg) by mouth 3 times daily for 5 days, Disp-15 capsule, R-0, Local Print      salicylic acid (MEDIPLAST) 40 % miscellaneous Twice a dayDisp-1 each, R-0Local Print             Final diagnoses:   Puncture wound   Callus of foot   Cellulitis of foot       8/12/2021   [facility]     Ganga Lambert MD  08/13/21 0023

## 2021-08-13 NOTE — ED NOTES
Patient complaining of increased pain in R foot since stepping on a nail 2 weeks ago. Last Tdap was 03/2020. Redness lines extend up medial foot to medial ankle. Denies any fever, chills, N/V/D, muscle aches, etc.

## 2021-08-13 NOTE — ED NOTES
Face to face report given with opportunity to observe patient.    Report given to JOHN Mayberry RN   8/12/2021  11:04 PM

## 2021-08-15 ENCOUNTER — HOSPITAL ENCOUNTER (EMERGENCY)
Facility: HOSPITAL | Age: 31
Discharge: HOME | End: 2021-08-15
Attending: NURSE PRACTITIONER | Admitting: NURSE PRACTITIONER
Payer: COMMERCIAL

## 2021-08-15 VITALS
TEMPERATURE: 97.4 F | SYSTOLIC BLOOD PRESSURE: 127 MMHG | RESPIRATION RATE: 16 BRPM | HEART RATE: 67 BPM | OXYGEN SATURATION: 98 % | DIASTOLIC BLOOD PRESSURE: 80 MMHG

## 2021-08-15 DIAGNOSIS — S91.331S: Primary | ICD-10-CM

## 2021-08-15 DIAGNOSIS — L03.119 CELLULITIS AND ABSCESS OF FOOT EXCLUDING TOE: ICD-10-CM

## 2021-08-15 DIAGNOSIS — L02.619 CELLULITIS AND ABSCESS OF FOOT EXCLUDING TOE: ICD-10-CM

## 2021-08-15 PROCEDURE — G0463 HOSPITAL OUTPT CLINIC VISIT: HCPCS

## 2021-08-15 PROCEDURE — 99214 OFFICE O/P EST MOD 30 MIN: CPT | Performed by: NURSE PRACTITIONER

## 2021-08-15 RX ORDER — KETOROLAC TROMETHAMINE 30 MG/ML
30 INJECTION, SOLUTION INTRAMUSCULAR; INTRAVENOUS ONCE
Status: DISCONTINUED | OUTPATIENT
Start: 2021-08-15 | End: 2021-08-15

## 2021-08-15 ASSESSMENT — ENCOUNTER SYMPTOMS
NAUSEA: 0
FEVER: 0
CHILLS: 0
DIARRHEA: 0
WOUND: 1
PSYCHIATRIC NEGATIVE: 1
VOMITING: 0
SHORTNESS OF BREATH: 0

## 2021-08-15 NOTE — ED NOTES
Triple antibiotic ointment applied to plantar surface of right foot wound and covered with a few 4X4s and wrapped loosely with kerlix.

## 2021-08-15 NOTE — DISCHARGE INSTRUCTIONS
Continue Clindamycin as ordered    Continue dressing changes    Follow up with primary care provider or return to urgent care/ED with any worsening in condition or additional concerns.

## 2021-08-15 NOTE — ED PROVIDER NOTES
History     Chief Complaint   Patient presents with     Foot Pain     HPI  Eddi Trejo is a 31 year old male who presents to urgent care today (ambulatory) with complaints of right foot pain 6/10, not currently taking anything for discomfort.  Patient was seen 2021 and had XR right foot completed and started on Clindamycin due to infection after patient had stepped on a miranda screw 2 weeks ago which patient states went in 1 1/2 inch.  Patient states taking clindamycin as ordered.  Has been soaking foot 2 times per day with salicylic acid.  Patient UTD on Tdap.  Patient returned today due to yellow purulent drainage from wound after significant other popped it with a needle.  Denies any fever, chills, nausea, vomiting, diarrhea, SOB or chest pain.  No other concerns.     Allergies:  Allergies   Allergen Reactions     Penicillins      Patient denies difficulty breathing with reaction. He reports a mild reaction and has previously tolerated keflex without difficulty.        Problem List:    There are no problems to display for this patient.       Past Medical History:    Past Medical History:   Diagnosis Date     Bronchitis 02     Headache 4/28/10     Open wound of forehead 4/28/10     Otitis 02     Schizophrenia (H) 4/28/10     URI (upper respiratory infection) 95       Past Surgical History:    Past Surgical History:   Procedure Laterality Date     ARTHROSCOPY KNEE Left 11/10/2017    Procedure: ARTHROSCOPY KNEE;  Left Knee Arthroscopy with Menical Repair;  Surgeon: Jp Pritchard DO;  Location: [facility]     ARTHROSCOPY KNEE Left 2019    Procedure: LEFT KNEE ARTHROSCOPY;  Surgeon: Noel Moralez MD;  Location: [facility]     [facility] CREATE EARDRUM OPENING,GEN ANESTH      PE tubes as child       Family History:    Family History   Problem Relation Age of Onset     Asthma Mother      Colon Cancer Maternal Grandfather          at 58     Breast Cancer Maternal Grandmother      Diabetes  Paternal Grandmother      Diabetes Paternal Aunt      Diabetes Maternal Aunt      C.A.D. No family hx of      Hypertension No family hx of        Social History:  Marital Status:  Single [1]  Social History     Tobacco Use     Smoking status: Never Smoker     Smokeless tobacco: Former User     Types: Chew     Tobacco comment: pt denied quit plan 4/16/19   Substance Use Topics     Alcohol use: Yes     Drug use: Yes     Types: Marijuana        Medications:    atomoxetine (STRATTERA) 10 MG capsule  clindamycin (CLEOCIN) 300 MG capsule  escitalopram (LEXAPRO) 20 MG tablet  gabapentin (NEURONTIN) 800 MG tablet  methylphenidate (RITALIN) 20 MG tablet      Review of Systems   Constitutional: Negative for chills and fever.   Respiratory: Negative for shortness of breath.    Cardiovascular: Negative for chest pain.   Gastrointestinal: Negative for diarrhea, nausea and vomiting.   Musculoskeletal: Negative for gait problem.   Skin: Positive for wound (sole right foot).   Psychiatric/Behavioral: Negative.      Physical Exam   BP: 127/80  Pulse: 67  Temp: (!) 96.6  F (35.9  C)  Resp: 16  SpO2: 98 %  T RECHECK: 97.4    Physical Exam  Vitals and nursing note reviewed.   Constitutional:       General: He is not in acute distress.     Appearance: He is not ill-appearing.   Cardiovascular:      Rate and Rhythm: Normal rate and regular rhythm.      Pulses: Normal pulses.      Heart sounds: Normal heart sounds.   Pulmonary:      Effort: Pulmonary effort is normal.      Breath sounds: Normal breath sounds.   Musculoskeletal:      Right foot: Normal range of motion and normal capillary refill. Tenderness (sole of foot) present. No bony tenderness or crepitus. Normal pulse.      Comments: Small induration to sole of right foot where patient previously stepped on a screw.  Injected lidocaine 1% without epi into wound and small amount of purulent drainage removed via needle.    Skin:     General: Skin is warm and dry.      Capillary  Refill: Capillary refill takes less than 2 seconds.   Neurological:      Mental Status: He is alert.   Psychiatric:         Mood and Affect: Mood normal.       ED Course     No results found for this or any previous visit (from the past 24 hour(s)).    Medications - No data to display    Assessments & Plan (with Medical Decision Making)     I have reviewed the nursing notes.    I have reviewed the findings, diagnosis, plan and need for follow up with the patient.  (S91.331S) Puncture wound to foot, right, sequela  (primary encounter diagnosis)  (L03.119,  L02.619) Cellulitis and abscess of foot excluding toe  Plan:   Small 1 cm  fluctuant indurated area to sole of right foot where patient had puncture wound from stepping on screw.  Injected lidocaine 1% without epi into wound and small amount of purulent drainage removed via needle.  Patient states pain overall resolved. Triple antibiotic and dressing placed to foot.  Patient to continue Clindamycin as ordered.  Continue dressing changes.  Patient to follow up with primary care provider or return to urgent care/ED with any worsening in condition or additional concerns.     New Prescriptions    No medications on file     Final diagnoses:   Puncture wound to foot, right, sequela   Cellulitis and abscess of foot excluding toe     8/15/2021   [facility]     Lucia Alaniz NP  08/15/21 [id number]

## 2021-08-15 NOTE — ED TRIAGE NOTES
Pt presents with his daughter and pt has a painful plantar surface of his right foot due to stepping on an old screw and he states that it went in about 1-1/2 inches into his foot. He had some yellow pus drainage from it last night.

## 2021-08-15 NOTE — ED TRIAGE NOTES
Stepped on a screw 2 weeks ago. Was here 2 days ago. Wants foot drained. Patients girlfriend poked with a needle last night and squeezed out a bunch of pus

## 2021-10-17 ENCOUNTER — APPOINTMENT (OUTPATIENT)
Dept: CT IMAGING | Facility: HOSPITAL | Age: 31
End: 2021-10-17
Attending: PHYSICIAN ASSISTANT

## 2021-10-17 ENCOUNTER — HOSPITAL ENCOUNTER (EMERGENCY)
Facility: HOSPITAL | Age: 31
Discharge: HOME | End: 2021-10-17
Attending: PHYSICIAN ASSISTANT | Admitting: PHYSICIAN ASSISTANT

## 2021-10-17 ENCOUNTER — HOSPITAL ENCOUNTER (OUTPATIENT)
Dept: ULTRASOUND IMAGING | Facility: OTHER | Age: 31
Discharge: HOME | End: 2021-10-17
Attending: PHYSICIAN ASSISTANT | Admitting: PHYSICIAN ASSISTANT

## 2021-10-17 VITALS
SYSTOLIC BLOOD PRESSURE: 132 MMHG | TEMPERATURE: 98.4 F | HEART RATE: 69 BPM | DIASTOLIC BLOOD PRESSURE: 77 MMHG | OXYGEN SATURATION: 97 % | RESPIRATION RATE: 16 BRPM

## 2021-10-17 DIAGNOSIS — N50.812 TESTICULAR PAIN, LEFT: Primary | ICD-10-CM

## 2021-10-17 LAB
ALBUMIN UR-MCNC: NEGATIVE MG/DL
APPEARANCE UR: CLEAR
BILIRUB UR QL STRIP: NEGATIVE
COLOR UR AUTO: ABNORMAL
GLUCOSE UR STRIP-MCNC: NEGATIVE MG/DL
HGB UR QL STRIP: NEGATIVE
HOLD SPECIMEN: NORMAL
KETONES UR STRIP-MCNC: NEGATIVE MG/DL
LEUKOCYTE ESTERASE UR QL STRIP: NEGATIVE
MUCOUS THREADS #/AREA URNS LPF: PRESENT /LPF
NITRATE UR QL: NEGATIVE
PH UR STRIP: 6.5 [PH] (ref 4.7–8)
RBC URINE: 1 /HPF
SP GR UR STRIP: 1.01 (ref 1–1.03)
SQUAMOUS EPITHELIAL: 0 /HPF
UROBILINOGEN UR STRIP-MCNC: NORMAL MG/DL
WBC URINE: 1 /HPF

## 2021-10-17 PROCEDURE — 250N000011 HC RX IP 250 OP 636: Performed by: PHYSICIAN ASSISTANT

## 2021-10-17 PROCEDURE — 99284 EMERGENCY DEPT VISIT MOD MDM: CPT | Performed by: PHYSICIAN ASSISTANT

## 2021-10-17 PROCEDURE — 76870 US EXAM SCROTUM: CPT | Mod: TC

## 2021-10-17 PROCEDURE — 96376 TX/PRO/DX INJ SAME DRUG ADON: CPT

## 2021-10-17 PROCEDURE — 74176 CT ABD & PELVIS W/O CONTRAST: CPT

## 2021-10-17 PROCEDURE — 96374 THER/PROPH/DIAG INJ IV PUSH: CPT

## 2021-10-17 PROCEDURE — 99285 EMERGENCY DEPT VISIT HI MDM: CPT | Mod: 25

## 2021-10-17 PROCEDURE — 81001 URINALYSIS AUTO W/SCOPE: CPT | Performed by: PHYSICIAN ASSISTANT

## 2021-10-17 PROCEDURE — 96375 TX/PRO/DX INJ NEW DRUG ADDON: CPT

## 2021-10-17 RX ORDER — KETOROLAC TROMETHAMINE 30 MG/ML
30 INJECTION, SOLUTION INTRAMUSCULAR; INTRAVENOUS ONCE
Status: COMPLETED | OUTPATIENT
Start: 2021-10-17 | End: 2021-10-17

## 2021-10-17 RX ORDER — HYDROCODONE BITARTRATE AND ACETAMINOPHEN 5; 325 MG/1; MG/1
1 TABLET ORAL EVERY 6 HOURS PRN
Qty: 12 TABLET | Refills: 0 | Status: SHIPPED | OUTPATIENT
Start: 2021-10-17 | End: 2021-10-17

## 2021-10-17 RX ORDER — HYDROCODONE BITARTRATE AND ACETAMINOPHEN 5; 325 MG/1; MG/1
1 TABLET ORAL EVERY 6 HOURS PRN
Qty: 12 TABLET | Refills: 0 | Status: SHIPPED | OUTPATIENT
Start: 2021-10-17 | End: 2023-09-05

## 2021-10-17 RX ORDER — HYDROCODONE BITARTRATE AND ACETAMINOPHEN 5; 325 MG/1; MG/1
1 TABLET ORAL EVERY 6 HOURS PRN
Qty: 15 TABLET | Refills: 0 | Status: SHIPPED | OUTPATIENT
Start: 2021-10-17 | End: 2023-09-05

## 2021-10-17 RX ORDER — ONDANSETRON 2 MG/ML
4 INJECTION INTRAMUSCULAR; INTRAVENOUS EVERY 30 MIN PRN
Status: DISCONTINUED | OUTPATIENT
Start: 2021-10-17 | End: 2021-10-17 | Stop reason: HOSPADM

## 2021-10-17 RX ORDER — HYDROMORPHONE HYDROCHLORIDE 1 MG/ML
0.5 INJECTION, SOLUTION INTRAMUSCULAR; INTRAVENOUS; SUBCUTANEOUS ONCE
Status: COMPLETED | OUTPATIENT
Start: 2021-10-17 | End: 2021-10-17

## 2021-10-17 RX ADMIN — HYDROMORPHONE HYDROCHLORIDE 1 MG: 1 INJECTION, SOLUTION INTRAMUSCULAR; INTRAVENOUS; SUBCUTANEOUS at 14:41

## 2021-10-17 RX ADMIN — HYDROMORPHONE HYDROCHLORIDE 0.5 MG: 1 INJECTION, SOLUTION INTRAMUSCULAR; INTRAVENOUS; SUBCUTANEOUS at 15:40

## 2021-10-17 RX ADMIN — KETOROLAC TROMETHAMINE 30 MG: 30 INJECTION, SOLUTION INTRAMUSCULAR at 14:19

## 2021-10-17 RX ADMIN — ONDANSETRON 4 MG: 2 INJECTION INTRAMUSCULAR; INTRAVENOUS at 14:20

## 2021-10-17 ASSESSMENT — ENCOUNTER SYMPTOMS
ABDOMINAL PAIN: 0
FLANK PAIN: 1
SHORTNESS OF BREATH: 0
DIFFICULTY URINATING: 1
VOMITING: 0
FEVER: 0
FREQUENCY: 1
CHILLS: 0
NAUSEA: 1

## 2021-10-17 NOTE — DISCHARGE INSTRUCTIONS
Please report to [facility] for ultrasound of the testicle.  I will call you with the results and further recommendations.

## 2021-10-17 NOTE — ED PROVIDER NOTES
History     Chief Complaint   Patient presents with     Testicular/scrotal Pain     The history is provided by the patient.     Eddi Trejo is a 31 year old male who presented to the emergency department ambulatory for evaluation of approximate 24-hour history of left testicular pain, lower urinary tract symptoms consisting of frequency and urgency as well as left flank pain. Denies any fevers or chills. Denies any hematuria. Denies any vomiting. Does have some nausea. Denies any recent trauma. Denies any GI complaints.    Allergies:  Allergies   Allergen Reactions     Penicillins      Patient denies difficulty breathing with reaction. He reports a mild reaction and has previously tolerated keflex without difficulty.        Problem List:    There are no problems to display for this patient.       Past Medical History:    Past Medical History:   Diagnosis Date     Bronchitis 02     Headache 4/28/10     Open wound of forehead 4/28/10     Otitis 02     Schizophrenia (H) 4/28/10     URI (upper respiratory infection) 95       Past Surgical History:    Past Surgical History:   Procedure Laterality Date     ARTHROSCOPY KNEE Left 11/10/2017    Procedure: ARTHROSCOPY KNEE;  Left Knee Arthroscopy with Menical Repair;  Surgeon: Jp Pritchard DO;  Location: [facility]     ARTHROSCOPY KNEE Left 2019    Procedure: LEFT KNEE ARTHROSCOPY;  Surgeon: Noel Moralez MD;  Location: [facility]     [facility] CREATE EARDRUM OPENING,GEN ANESTH      PE tubes as child       Family History:    Family History   Problem Relation Age of Onset     Asthma Mother      Colon Cancer Maternal Grandfather          at 58     Breast Cancer Maternal Grandmother      Diabetes Paternal Grandmother      Diabetes Paternal Aunt      Diabetes Maternal Aunt      C.A.D. No family hx of      Hypertension No family hx of        Social History:  Marital Status:  Single [1]  Social History     Tobacco Use     Smoking status: Never Smoker      Smokeless tobacco: Former User     Types: Chew     Tobacco comment: pt denied quit plan 4/16/19   Substance Use Topics     Alcohol use: Yes     Drug use: Yes     Types: Marijuana        Medications:    atomoxetine (STRATTERA) 10 MG capsule  gabapentin (NEURONTIN) 800 MG tablet  HYDROcodone-acetaminophen (NORCO) 5-325 MG tablet  methylphenidate (RITALIN) 20 MG tablet  escitalopram (LEXAPRO) 20 MG tablet          Review of Systems   Constitutional: Negative for chills and fever.   Respiratory: Negative for shortness of breath.    Gastrointestinal: Positive for nausea. Negative for abdominal pain and vomiting.   Genitourinary: Positive for difficulty urinating, flank pain, frequency, testicular pain and urgency. Negative for penile swelling and scrotal swelling.   Skin: Negative.    Allergic/Immunologic: Negative for immunocompromised state.       Physical Exam   BP: 128/83  Pulse: 78  Temp: 98.4  F (36.9  C)  Resp: 16  SpO2: 98 %      Physical Exam  Vitals and nursing note reviewed.   Constitutional:       General: He is in acute distress.      Appearance: Normal appearance. He is normal weight. He is not ill-appearing, toxic-appearing or diaphoretic.   Cardiovascular:      Rate and Rhythm: Normal rate and regular rhythm.   Pulmonary:      Effort: Pulmonary effort is normal.   Abdominal:      Palpations: Abdomen is soft.      Comments: Abdomen is soft with no significant tenderness appreciated. Scrotum is unremarkable. Left testicle low hanging and horizontal. Cremasteric reflex seems intact. inguinal canal shows tenderness on exam.   Skin:     General: Skin is warm and dry.      Capillary Refill: Capillary refill takes less than 2 seconds.   Neurological:      General: No focal deficit present.      Mental Status: He is alert and oriented to person, place, and time.   Psychiatric:         Mood and Affect: Mood normal.         ED Course     ED Course as of Oct 17 1803   Sun Oct 17, 2021   1602 Given the patient's  pain severity as well as equivocal examination and my preliminary review of the CT scan which shows no evidence of obstructing ureteral stone or other evidence of hernia in the pelvis, I do believe that he would fit any reasonable indication for emergent ultrasound of the testicles.  POCUS evaluation shows what seems to be blood flow on Doppler.  However, he requires official imaging to rule out torsion.  Unfortunately we do not have ultrasound coverage at [facility] today.  I did discuss the importance of this examination and the patient agreed.  Discussed with [place] emergency department provider Kaden Huber who acknowledge the patient will be coming for outpatient evaluation.  The patient voiced understanding of the instructions.  Private vehicle transport can be done immediately and will be far more expeditious than transport via EMS.  IV was discontinued prior to private vehicle transport.        Procedures              Critical Care time:  none               Results for orders placed or performed during the hospital encounter of 10/17/21 (from the past 24 hour(s))   [facility]    Narrative    The following orders were created for panel order [facility].  Procedure                               Abnormality         Status                     ---------                               -----------         ------                     Extra Red Top Tube[[id number]]                               Final result               Extra Green Top (Lithium...[[id number]]                      Final result               Extra Green Top (Lithium...[[id number]]                      Final result               Extra Purple Top Tube[[id number]]                            Final result                 Please view results for these tests on the individual orders.   Extra Red Top Tube   Result Value Ref Range    Hold Specimen JIC    Extra Green Top (Lithium Heparin) Tube   Result Value Ref Range    Hold Specimen JIC    Extra  Green Top (Lithium Heparin) Tube   Result Value Ref Range    Hold Specimen JIC    Extra Purple Top Tube   Result Value Ref Range    Hold Specimen JIC    UA with Microscopic reflex to Culture    Specimen: Urine, Midstream   Result Value Ref Range    Color Urine Light Yellow Colorless, Straw, Light Yellow, Yellow    Appearance Urine Clear Clear    Glucose Urine Negative Negative mg/dL    Bilirubin Urine Negative Negative    Ketones Urine Negative Negative mg/dL    Specific Gravity Urine 1.012 1.003 - 1.035    Blood Urine Negative Negative    pH Urine 6.5 4.7 - 8.0    Protein Albumin Urine Negative Negative mg/dL    Urobilinogen Urine Normal Normal, 2.0 mg/dL    Nitrite Urine Negative Negative    Leukocyte Esterase Urine Negative Negative    Mucus Urine Present (A) None Seen /LPF    RBC Urine 1 <=2 /HPF    WBC Urine 1 <=5 /HPF    Squamous Epithelials Urine 0 <=1 /HPF    Narrative    Urine Culture not indicated   CT Abdomen Pelvis w/o Contrast    Narrative    CT ABDOMEN PELVIS W/O CONTRAST    CLINICAL HISTORY: Male, age 31 years,  Flank pain, kidney stone  suspected; left flank and testicular pain;    Comparison:  CT scan abdomen and pelvis 12/22/2015    TECHNIQUE:  CT was performed of the abdomen and pelvis without   contrast. Sagittal, coronal, axial and MIP reconstructions were  reviewed.     FINDINGS:  The lung bases are clear.    Stomach and duodenum: Normal    Liver: Normal.    Gallbladder: Normal.    Spleen: Normal.    Pancreas: Normal.    Adrenal glands: Normal.    Kidneys: Normal.    Ureters: Normal.    Urinary bladder: Normal.    Large and small bowel: Normal.    Appendix: Normal.    There is no evidence of pathologic lymph node enlargement. Vascular  structures are grossly normal.    Bony structures demonstrate no acute abnormality. There are mild  degenerative changes seen within the lumbosacral junction.      Impression    IMPRESSION:   No acute abnormality.     Mild degenerative changes of the lumbar  spine, most severe at the  lumbosacral junction.    This report is in agreement with the preliminary report.    KRIS DWYER MD         SYSTEM ID:  [id number]   US Testicular & Scrotum w Doppler Ltd    Narrative    PROCEDURE INFORMATION:   Exam: US Scrotum   Exam date and time: 10/17/2021 5:25 PM   Age: 31 years old   Clinical indication: Scrotum pain; Additional info: Severe left testicular pain     TECHNIQUE:   Imaging protocol: Real-time ultrasound of the scrotum and contents with color   Doppler and image documentation.     COMPARISON:   No relevant prior studies available.     FINDINGS:   Right testicle: The right testicle is normal appearance without evidence of   mass, measuring 4.3 x 4.0 x 2.4 cm. Normal arterial flow in the left testicle.   Left testicle: The left testicle is normal appearance measuring without   evidence of mass, measuring 4.6 x 2.9 x 2.9 cm. Normal arterial flow in the   left testicle.   Epididymides: The epididymi are unremarkable in appearance.   Scrotum: Trace bilateral hydroceles.       Impression    IMPRESSION:   1. No evidence of testicular mass or torsion.   2. Trace bilateral hydroceles.     THIS DOCUMENT HAS BEEN ELECTRONICALLY SIGNED BY JÚNIOR MARVIN MD       Medications   ondansetron (ZOFRAN) injection 4 mg (4 mg Intravenous Given 10/17/21 1420)   ketorolac (TORADOL) injection 30 mg (30 mg Intravenous Given 10/17/21 1419)   HYDROmorphone (DILAUDID) injection 1 mg (1 mg Intravenous Given 10/17/21 1441)   HYDROmorphone (PF) (DILAUDID) injection 0.5 mg (0.5 mg Intravenous Given 10/17/21 1540)       Assessments & Plan (with Medical Decision Making)   Given the patient's pain level as well as testicular focus, my preliminary review of the CT scan, and lack of official ultrasound here at [facility] today I do believe that he would fit any reasonable indication for transfer via private vehicle to [facility] for official rule out of testicular torsion.  He has physical  examination shows a low hanging left testicle and normal orientation without appreciable swelling.    Addendum at 1800: Official ultrasound report shows no evidence of testicular torsion, mass, or epididymitis.  The patient was called with these results and pain medications were prescribed.  Urology referral was placed.  He was advised to return to this emergency department for any changes in his condition, new symptoms, or other questions or concerns whatsoever.  He voiced complete understanding was agreeable.    This document was prepared using a combination of typing and voice generated software.  While every attempt was made for accuracy, spelling and grammatical errors may exist.    I have reviewed the nursing notes.    I have reviewed the findings, diagnosis, plan and need for follow up with the patient.       New Prescriptions    HYDROCODONE-ACETAMINOPHEN (NORCO) 5-325 MG TABLET    Take 1 tablet by mouth every 6 hours as needed for pain       Final diagnoses:   Testicular pain, left       10/17/2021   [facility]     Vicki Rivera PA-C  10/17/21 [id number]

## 2021-10-17 NOTE — ED TRIAGE NOTES
"Left groin painful started yesterday. Left testicle also painful. No discoloration or change in size. Reports \"I cant piss today\"  "

## 2021-10-17 NOTE — PROGRESS NOTES
Vital signs completed pre- ultrasound:   T: 97.0 oral  P: 84  R: 16  BP: 125/70  SpO2: 97% RA    Vital signs completed post ultrasound  T: 98.5 Tympanic  P: 76  R: 16  BP: 135/79  SpO2: 98% RA    Patient discharged to return back to [facility] ER.

## 2021-10-17 NOTE — ED NOTES
Pt bladder scanned fro 251 ml, pt wants to see if he can urinate after pain meds kick in before being straight cathed. Pt states his back pain started today, scrotal pain yesterday, reports minor burning with urination yesterday.

## 2021-10-20 ENCOUNTER — TELEPHONE (OUTPATIENT)
Dept: UROLOGY | Facility: OTHER | Age: 31
End: 2021-10-20

## 2021-10-28 ENCOUNTER — NURSE TRIAGE (OUTPATIENT)
Dept: FAMILY MEDICINE | Facility: OTHER | Age: 31
End: 2021-10-28

## 2021-10-28 DIAGNOSIS — Z20.822 EXPOSURE TO 2019 NOVEL CORONAVIRUS: ICD-10-CM

## 2021-10-28 DIAGNOSIS — Z20.822 SUSPECTED 2019 NOVEL CORONAVIRUS INFECTION: Primary | ICD-10-CM

## 2021-10-28 NOTE — TELEPHONE ENCOUNTER
"COVID 19 Nurse Triage Plan/Patient Instructions    Please be aware that novel coronavirus (COVID-19) may be circulating in the community. If you develop symptoms such as fever, cough, or SOB or if you have concerns about the presence of another infection including coronavirus (COVID-19), please contact your health care provider or visit [email or web address].     Disposition/Instructions    Additional COVID19 information to add for patients.   How can I protect others?  If you have symptoms (fever, cough, body aches or trouble breathing): Stay home and away from others (self-isolate) until:    At least 10 days have passed since your symptoms started, And     You ve had no fever--and no medicine that reduces fever--for 1 full day (24 hours), And      Your other symptoms have resolved (gotten better).     If you don t have symptoms, but a test showed that you have COVID-19 (you tested positive):    Stay home and away from others (self-isolate). Follow the tips under \"How do I self-isolate?\" below for 10 days (20 days if you have a weak immune system).    You don't need to be retested for COVID-19 before going back to school or work. As long as you're fever-free and feeling better, you can go back to school, work and other activities after waiting the 10 or 20 days.     How do I self-isolate?    Stay in your own room, even for meals. Use your own bathroom if you can.     Stay away from others in your home. No hugging, kissing or shaking hands. No visitors.    Don t go to work, school or anywhere else.     Clean  high touch  surfaces often (doorknobs, counters, handles, etc.). Use a household cleaning spray or wipes. You ll find a full list on the EPA website:  www.epa.gov/pesticide-registration/list-n-disinfectants-use-against-sars-cov-2.    Cover your mouth and nose with a mask, tissue or washcloth to avoid spreading germs.    Wash your hands and face often. Use soap and water.    Caregivers in these groups are " at risk for severe illness due to COVID-19:  o People 65 years and older  o People who live in a nursing home or long-term care facility  o People with chronic disease (lung, heart, cancer, diabetes, kidney, liver, immunologic)  o People who have a weakened immune system, including those who:  - Are in cancer treatment  - Take medicine that weakens the immune system, such as corticosteroids  - Had a bone marrow or organ transplant  - Have an immune deficiency  - Have poorly controlled HIV or AIDS  - Are obese (body mass index of 40 or higher)  - Smoke regularly    Caregivers should wear gloves while washing dishes, handling laundry and cleaning bedrooms and bathrooms.    Use caution when washing and drying laundry: Don t shake dirty laundry, and use the warmest water setting that you can.    For more tips, go to www.cdc.gov/coronavirus/2019-ncov/downloads/10Things.pdf.    How can I take care of myself?  1. Get lots of rest. Drink extra fluids (unless a doctor has told you not to).     2. Take Tylenol (acetaminophen) for fever or pain. If you have liver or kidney problems, ask your family doctor if it s okay to take Tylenol.     Adults can take either:     650 mg (two 325 mg pills) every 4 to 6 hours, or     1,000 mg (two 500 mg pills) every 8 hours as needed.     Note: Don t take more than 3,000 mg in one day.   Acetaminophen is found in many medicines (both prescribed and over-the-counter medicines). Read all labels to be sure you don t take too much.     For children, check the Tylenol bottle for the right dose. The dose is based on the child s age or weight.    3. If you have other health problems (like cancer, heart failure, an organ transplant or severe kidney disease): Call your specialty clinic if you don t feel better in the next 2 days.    4. Know when to call 911: Emergency warning signs include:    Trouble breathing or shortness of breath    Pain or pressure in the chest that doesn t go away    Feeling  confused like you haven t felt before, or not being able to wake up    Bluish-colored lips or face    What are the symptoms of COVID-19?     The most common symptoms are cough, fever and trouble breathing.     Less common symptoms include body aches, chills, diarrhea (loose, watery poops), fatigue (feeling very tired), headache, runny nose, sore throat and loss of smell.    COVID-19 can cause severe coughing (bronchitis) and lung infection (pneumonia).    How does it spread?     The virus may spread when a person coughs or sneezes into the air. The virus can travel about 6 feet this way, and it can live on surfaces.      Common  (household disinfectants) will kill the virus.    Who is at risk?  Anyone can catch COVID-19 if they re around someone who has the virus.    How can others protect themselves?     Stay away from people who have COVID-19 (or symptoms of COVID-19).    Wash hands often with soap and water. Or, use hand  with at least 60% alcohol.    Avoid touching the eyes, nose or mouth.     Wear a face mask when you go out in public, when sick or when caring for a sick person.    Where can I get more information?     [facility] [facility]: About COVID-19: [email or web address]    CDC: What to Do If You re Sick: www.cdc.gov/coronavirus/2019-ncov/about/steps-when-sick.html    CDC: Ending Home Isolation: www.cdc.gov/coronavirus/2019-ncov/hcp/disposition-in-home-patients.html     CDC: Caring for Someone: www.cdc.gov/coronavirus/2019-ncov/if-you-are-sick/care-for-someone.html     [facility]: Interim Guidance for Hospital Discharge to Home: www.health.[facility].mn.us/diseases/coronavirus/hcp/hospdischarge.pdf    [facility] clinical trials (COVID-19 research studies): clinicalaffairs.[facility]/umn-clinical-trials     Below are the COVID-19 hotlines at the [place] Department of Health ([facility]). Interpreters are available.   o For health questions: Call 681-814-7045 or 1-333.414.6687 (7 a.m. to 7  "p.m.)  o For questions about schools and childcare: Call 314-064-1828 or 1-607.341.7504 (7 a.m. to 7 p.m.)          Thank you for taking steps to prevent the spread of this virus.  o Limit your contact with others.  o Wear a simple mask to cover your cough.  o Wash your hands well and often.    Resources    [facility]: About COVID-19: [email or web address]    CDC: What to Do If You're Sick: www.cdc.gov/coronavirus/2019-ncov/about/steps-when-sick.html    CDC: Ending Home Isolation: www.cdc.gov/coronavirus/2019-ncov/hcp/disposition-in-home-patients.html     CDC: Caring for Someone: www.cdc.gov/coronavirus/2019-ncov/if-you-are-sick/care-for-someone.html     [facility]: Interim Guidance for Hospital Discharge to Home: www.[facility].mn./diseases/coronavirus/hcp/hospdischarge.pdf    [facility] clinical trials (COVID-19 research studies): clinicalaffairs.[facility]-clinical-trials     Below are the COVID-19 hotlines at the [place] Department [facility] ([facility]). Interpreters are available.   o For health questions: Call 661-134-0656 or 1-789.666.9841 (7 a.m. to 7 p.m.)  o For questions about schools and childcare: Call 383-817-9651 or 1-862.736.6606 (7 a.m. to 7 p.m.)                     .  Answer Assessment - Initial Assessment Questions  1. COVID-19 EXPOSURE: \"Please describe how you were exposed to someone with a COVID-19 infection.\"      family  2. PLACE of CONTACT: \"Where were you when you were exposed to COVID-19?\" (e.g., home, school, medical waiting room; which city?)      home  3. TYPE of CONTACT: \"How much contact was there?\" (e.g., sitting next to, live in same house, work in same office, same building)      hours  4. DURATION of CONTACT: \"How long were you in contact with the COVID-19 patient?\" (e.g., a few seconds, passed by person, a few minutes, 15 minutes or longer, live with the patient)      hours  5. MASK: \"Were you wearing a mask?\" \"Was the other person wearing a mask?\" Note: " "wearing a mask reduces the risk of an otherwise close contact.      no  6. DATE of CONTACT: \"When did you have contact with a COVID-19 patient?\" (e.g., how many days ago)      3 days ago  7. COMMUNITY SPREAD: \"Are there lots of cases of COVID-19 (community spread) where you live?\" (See public health department website, if unsure)        yes  8. SYMPTOMS: \"Do you have any symptoms?\" (e.g., fever, cough, breathing difficulty, loss of taste or smell)      Fever cough sore throat and headache  9. PREGNANCY OR POSTPARTUM: \"Is there any chance you are pregnant?\" \"When was your last menstrual period?\" \"Did you deliver in the last 2 weeks?\"      no  10. HIGH RISK: \"Do you have any heart or lung problems?\" \"Do you have a weak immune system?\" (e.g., heart failure, COPD, asthma, HIV positive, chemotherapy, renal failure, diabetes mellitus, sickle cell anemia, obesity)        no  11. TRAVEL: \"Have you traveled out of the country recently?\" If Yes, ask: \"When and where?\" Also ask about out-of-state travel, since the [facility] has identified some high-risk cities for community spread in the . Note: Travel becomes less relevant if there is widespread community transmission where the patient lives.        no    Protocols used: CORONAVIRUS (COVID-19) EXPOSURE-A- 8.25.2021      "

## 2022-01-24 VITALS
TEMPERATURE: 97.7 F | OXYGEN SATURATION: 99 % | RESPIRATION RATE: 16 BRPM | SYSTOLIC BLOOD PRESSURE: 144 MMHG | HEART RATE: 81 BPM | DIASTOLIC BLOOD PRESSURE: 93 MMHG

## 2022-01-24 PROCEDURE — 99282 EMERGENCY DEPT VISIT SF MDM: CPT | Performed by: STUDENT IN AN ORGANIZED HEALTH CARE EDUCATION/TRAINING PROGRAM

## 2022-01-24 PROCEDURE — 99282 EMERGENCY DEPT VISIT SF MDM: CPT

## 2022-01-25 ENCOUNTER — HOSPITAL ENCOUNTER (EMERGENCY)
Facility: HOSPITAL | Age: 32
Discharge: HOME | End: 2022-01-25
Attending: STUDENT IN AN ORGANIZED HEALTH CARE EDUCATION/TRAINING PROGRAM | Admitting: STUDENT IN AN ORGANIZED HEALTH CARE EDUCATION/TRAINING PROGRAM

## 2022-01-25 DIAGNOSIS — K08.89 PAIN, DENTAL: ICD-10-CM

## 2022-01-25 NOTE — ED PROVIDER NOTES
History     Chief Complaint   Patient presents with     Dental Pain     HPI  Eddi Trejo is a 32 year old male who presents with dental pain. Unclear how long it has been going on. When I went to assess the patient he became too angry, elevated, and dangerous, and I was unable to obtain a history.     Allergies:  Allergies   Allergen Reactions     Penicillins      Patient denies difficulty breathing with reaction. He reports a mild reaction and has previously tolerated keflex without difficulty.        Problem List:    There are no problems to display for this patient.       Past Medical History:    Past Medical History:   Diagnosis Date     Bronchitis 02     Headache 4/28/10     Open wound of forehead 4/28/10     Otitis 02     Schizophrenia (H) 4/28/10     URI (upper respiratory infection) 95       Past Surgical History:    Past Surgical History:   Procedure Laterality Date     ARTHROSCOPY KNEE Left 11/10/2017    Procedure: ARTHROSCOPY KNEE;  Left Knee Arthroscopy with Menical Repair;  Surgeon: Jp Pritchard DO;  Location: [facility]     ARTHROSCOPY KNEE Left 2019    Procedure: LEFT KNEE ARTHROSCOPY;  Surgeon: Noel Moralez MD;  Location: [facility] OR     [facility] CREATE EARDRUM OPENING,GEN ANESTH      PE tubes as child       Family History:    Family History   Problem Relation Age of Onset     Asthma Mother      Colon Cancer Maternal Grandfather          at 58     Breast Cancer Maternal Grandmother      Diabetes Paternal Grandmother      Diabetes Paternal Aunt      Diabetes Maternal Aunt      C.A.D. No family hx of      Hypertension No family hx of        Social History:  Marital Status:  Single [1]  Social History     Tobacco Use     Smoking status: Never Smoker     Smokeless tobacco: Former User     Types: Chew     Tobacco comment: pt denied quit plan 19   Substance Use Topics     Alcohol use: Yes     Drug use: Yes     Types: Marijuana        Medications:    atomoxetine  (STRATTERA) 10 MG capsule  escitalopram (LEXAPRO) 20 MG tablet  gabapentin (NEURONTIN) 800 MG tablet  HYDROcodone-acetaminophen (NORCO) 5-325 MG tablet  HYDROcodone-acetaminophen (NORCO) 5-325 MG tablet  methylphenidate (RITALIN) 20 MG tablet          Review of Systems  Unable to obtain as the patient was too agitated, screaming, and cussing.    Physical Exam   BP: 144/93  Pulse: 81  Temp: 97.7  F (36.5  C)  Resp: 16  SpO2: 99 %      Physical Exam  Constitutional:       General: He is not in acute distress.     Appearance: He is not ill-appearing or toxic-appearing.   HENT:      Right Ear: External ear normal.      Left Ear: External ear normal.   Eyes:      Pupils: Pupils are equal, round, and reactive to light.   Pulmonary:      Effort: Pulmonary effort is normal.   Musculoskeletal:         General: Normal range of motion.      Cervical back: Normal range of motion.   Skin:     General: Skin is warm and dry.   Neurological:      General: No focal deficit present.      Mental Status: He is alert and oriented to person, place, and time.   Psychiatric:      Comments: Agitated behavior       Unsafe to perform beyond what is listed.  ED Course        ED Course as of 01/25/22 0221   Tue Jan 25, 2022 0215 Patient became verbally aggressive about having to wait. I apolgoized and said I would get to helping him as soon as I possibly could. I did offer tylenol in the meantime to help with pain which he refused.  I when I got to the room he said he was going to punch me in the throat. I then said I didn't feel safe and asked him to lower his voice and cooperative so that I could care for  him. He refused to do this and continued to elevate despite multiple attempts to work with him so that we could treat him for a possible dental infection. He then started punching the wall and doors.  He then said he was going to attempt to kill me when I left and that he wanted to see me outside. We then escorted him out of the building  as it was no longer safe for myself or staff to treat the patient. Police were called to file a report.     Procedures            No results found for this or any previous visit (from the past 24 hour(s)).    Medications - No data to display    Assessments & Plan (with Medical Decision Making)     I have reviewed the nursing notes.    See ED Course.    I have reviewed the findings, diagnosis, plan and need for follow up with the patient.    New Prescriptions    No medications on file       Final diagnoses:   Pain, dental       1/24/2022   [facility]     Laz Atkins MD  01/25/22 [id number]

## 2022-01-25 NOTE — ED NOTES
"Writer heard some loud noise coming from room 11. I advised security that pt was being loud, yelling and swearing. Security and Dr. Atkins then went to the room and I continued to hear pt swearing and yelling. I heard the pt say to Dr. Atkins \"I am going to meet you outback and fuck you up\". Pt also stated he was going to \"fucking kill you\". Pt was escorted out by security and continued to yell and hit things on his way out.     Alton Srinivasan, MSN, RN on 1/25/2022 at 2:20 AM    "

## 2022-01-25 NOTE — ED TRIAGE NOTES
States dental pain left lower that started to get bad today. Has tried to get into as dentist but is not having any luck. Did take Tylenol and Ibuprofen at 2100.

## 2022-01-25 NOTE — ED NOTES
States he has broken teeth and cavities left lower and that he has tried to get in to see a dentist but not having any luck. States now that gums are swelling and pain has increased.

## 2022-09-30 ENCOUNTER — HOSPITAL ENCOUNTER (EMERGENCY)
Facility: HOSPITAL | Age: 32
Discharge: HOME | End: 2022-09-30
Attending: NURSE PRACTITIONER | Admitting: NURSE PRACTITIONER
Payer: MEDICAID

## 2022-09-30 ENCOUNTER — APPOINTMENT (OUTPATIENT)
Dept: GENERAL RADIOLOGY | Facility: HOSPITAL | Age: 32
End: 2022-09-30
Attending: NURSE PRACTITIONER
Payer: MEDICAID

## 2022-09-30 VITALS
TEMPERATURE: 97.6 F | OXYGEN SATURATION: 97 % | RESPIRATION RATE: 16 BRPM | DIASTOLIC BLOOD PRESSURE: 76 MMHG | SYSTOLIC BLOOD PRESSURE: 123 MMHG | HEART RATE: 83 BPM

## 2022-09-30 DIAGNOSIS — M79.641 PAIN OF RIGHT HAND: ICD-10-CM

## 2022-09-30 PROCEDURE — 73130 X-RAY EXAM OF HAND: CPT | Mod: RT

## 2022-09-30 PROCEDURE — 99213 OFFICE O/P EST LOW 20 MIN: CPT | Performed by: NURSE PRACTITIONER

## 2022-09-30 PROCEDURE — G0463 HOSPITAL OUTPT CLINIC VISIT: HCPCS

## 2022-09-30 ASSESSMENT — ENCOUNTER SYMPTOMS: NUMBNESS: 1

## 2022-10-01 NOTE — ED PROVIDER NOTES
History     Chief Complaint   Patient presents with     Hand Injury     HPI  Eddi Trejo is a 32 year old male who presents today for an evaluation of a 1 day history of right hand pain.  He states that yesterday while he was stacking wood he had a crush type injury in which his hand was stuck between 2 logs.  Today, he rates his right hand pain a 5 out of 10 and describes it as constant.  He localizes his pain along the fifth metacarpal region of his right hand.  Pain radiates from distal to proximal aspect of metacarpal.  Closing his hand exacerbates the pain.  He denies any history of previous injury or surgery to his right hand.    Allergies:  Allergies   Allergen Reactions     Penicillins      Patient denies difficulty breathing with reaction. He reports a mild reaction and has previously tolerated keflex without difficulty.        Problem List:    There are no problems to display for this patient.       Past Medical History:    Past Medical History:   Diagnosis Date     Bronchitis 02     Headache 4/28/10     Open wound of forehead 4/28/10     Otitis 02     Schizophrenia (H) 4/28/10     URI (upper respiratory infection) 95       Past Surgical History:    Past Surgical History:   Procedure Laterality Date     ARTHROSCOPY KNEE Left 11/10/2017    Procedure: ARTHROSCOPY KNEE;  Left Knee Arthroscopy with Menical Repair;  Surgeon: Jp Pritchard DO;  Location: [facility]     ARTHROSCOPY KNEE Left 2019    Procedure: LEFT KNEE ARTHROSCOPY;  Surgeon: Noel Moralez MD;  Location: [facility]     [facility] CREATE EARDRUM OPENING,GEN ANESTH      PE tubes as child       Family History:    Family History   Problem Relation Age of Onset     Asthma Mother      Colon Cancer Maternal Grandfather          at 58     Breast Cancer Maternal Grandmother      Diabetes Paternal Grandmother      Diabetes Paternal Aunt      Diabetes Maternal Aunt      C.A.D. No family hx of      Hypertension No family hx of         Social History:  Marital Status:  Single [1]  Social History     Tobacco Use     Smoking status: Never Smoker     Smokeless tobacco: Former User     Types: Chew     Tobacco comment: pt denied quit plan 4/16/19   Substance Use Topics     Alcohol use: Yes     Drug use: Yes     Types: Marijuana        Medications:    atomoxetine (STRATTERA) 10 MG capsule  escitalopram (LEXAPRO) 20 MG tablet  gabapentin (NEURONTIN) 800 MG tablet  HYDROcodone-acetaminophen (NORCO) 5-325 MG tablet  HYDROcodone-acetaminophen (NORCO) 5-325 MG tablet  methylphenidate (RITALIN) 20 MG tablet          Review of Systems   Musculoskeletal:        Right hand joint pain and swelling.   Neurological: Positive for numbness.        Some numbness and tingling to right hand.       Physical Exam   BP: 123/76  Pulse: 83  Temp: 97.6  F (36.4  C)  Resp: 16  SpO2: 97 %      Physical Exam  Constitutional:       Appearance: Normal appearance.   Musculoskeletal:      Right hand: Swelling, tenderness and bony tenderness present. No deformity or lacerations. Normal range of motion. Normal strength. Normal sensation. Normal capillary refill. Normal pulse.      Comments: Modest amount of generalized swelling to right hand when compared to left.  There is no bony abnormalities to palpation of hand.  Patient does have some tenderness palpation overlying fifth metacarpal region.  Color, motion, sensation intact to digits of right hand.   Skin:     Capillary Refill: Capillary refill takes less than 2 seconds.   Neurological:      Mental Status: He is alert.         ED Course                 Procedures             Critical Care time:               Results for orders placed or performed during the hospital encounter of 09/30/22 (from the past 24 hour(s))   XR Hand Right G/E 3 Views    Narrative    XR HAND RIGHT G/E 3 VIEWS    HISTORY: 32 years Male Pt smashed hand in between logs.    COMPARISON: 9/1/2019    TECHNIQUE: Right hand 3 views    FINDINGS: Joint spaces  are congruent. Articular surfaces are smooth.  There is no evidence of fracture or dislocation.      Impression    IMPRESSION: No evidence of fracture or dislocation.    ABDIEL NICHOLS MD         SYSTEM ID:  [id number]       Medications - No data to display    Assessments & Plan (with Medical Decision Making)       #1.  Pain to right hand: Patient has pain to right hand following a crush type injury.  X-ray results did not demonstrate any acute fracture or dislocation.  Patient continues to have some tenderness to palpation along the fifth metacarpal.  He also has some faint numbness and tingling to his hand.  His color motion sensation is intact to his right hand.  At this point I am suspicious that he has some underlying swelling that is affecting the nerve and causing the numbness and tingling sensation.  Recommended ice, ibuprofen and rest.  Follow-up if numbness and tingling worsens or persist.      I have reviewed the nursing notes.    I have reviewed the findings, diagnosis, plan and need for follow up with the patient.      Discharge Medication List as of 9/30/2022  9:47 PM          Final diagnoses:   Pain of right hand       9/30/2022   [facility]     Carisa Baugh NP  09/30/22 [id number]

## 2022-10-01 NOTE — DISCHARGE INSTRUCTIONS
There is no evidence of fracture or dislocation to your hand.  At this time you can use Tylenol and ibuprofen as needed for pain.  Please follow-up if you develop any numbness or tingling to your fingers or hand.

## 2022-10-01 NOTE — ED TRIAGE NOTES
Pt presents with right hand pain from crushing in between logs. Rates current pain 5/10. CMS intact.

## 2022-10-01 NOTE — ED TRIAGE NOTES
Patient presents with c/o right hand pain. Patient reports that he was stacking wood yesterday and crushed hand in between 2 pieces of wood. No obvious deformity, CMS intact.

## 2022-10-21 ENCOUNTER — HOSPITAL ENCOUNTER (EMERGENCY)
Facility: HOSPITAL | Age: 32
End: 2022-10-21

## 2022-12-19 ENCOUNTER — APPOINTMENT (OUTPATIENT)
Dept: MRI IMAGING | Facility: HOSPITAL | Age: 32
End: 2022-12-19
Attending: PHYSICIAN ASSISTANT
Payer: MEDICAID

## 2022-12-19 ENCOUNTER — HOSPITAL ENCOUNTER (EMERGENCY)
Facility: HOSPITAL | Age: 32
Discharge: HOME | End: 2022-12-19
Attending: PHYSICIAN ASSISTANT | Admitting: PHYSICIAN ASSISTANT
Payer: MEDICAID

## 2022-12-19 VITALS
OXYGEN SATURATION: 100 % | TEMPERATURE: 98.3 F | HEART RATE: 82 BPM | DIASTOLIC BLOOD PRESSURE: 56 MMHG | SYSTOLIC BLOOD PRESSURE: 122 MMHG | RESPIRATION RATE: 16 BRPM

## 2022-12-19 DIAGNOSIS — R39.198 DIFFICULTY URINATING: ICD-10-CM

## 2022-12-19 DIAGNOSIS — M51.369 DDD (DEGENERATIVE DISC DISEASE), LUMBAR: ICD-10-CM

## 2022-12-19 DIAGNOSIS — R20.2 PARESTHESIA OF BILATERAL LEGS: ICD-10-CM

## 2022-12-19 DIAGNOSIS — M54.50 SEVERE LUMBAR PAIN: ICD-10-CM

## 2022-12-19 LAB
ALBUMIN SERPL BCG-MCNC: 4.4 G/DL (ref 3.5–5.2)
ALBUMIN UR-MCNC: 10 MG/DL
ALP SERPL-CCNC: 43 U/L (ref 40–129)
ALT SERPL W P-5'-P-CCNC: 20 U/L (ref 10–50)
ANION GAP SERPL CALCULATED.3IONS-SCNC: 10 MMOL/L (ref 7–15)
APPEARANCE UR: CLEAR
AST SERPL W P-5'-P-CCNC: 27 U/L (ref 10–50)
BASOPHILS # BLD AUTO: 0 10E3/UL (ref 0–0.2)
BASOPHILS NFR BLD AUTO: 0 %
BILIRUB SERPL-MCNC: 0.3 MG/DL
BILIRUB UR QL STRIP: NEGATIVE
BUN SERPL-MCNC: 10.6 MG/DL (ref 6–20)
CALCIUM SERPL-MCNC: 9.6 MG/DL (ref 8.6–10)
CHLORIDE SERPL-SCNC: 102 MMOL/L (ref 98–107)
COLOR UR AUTO: YELLOW
CREAT SERPL-MCNC: 1.06 MG/DL (ref 0.67–1.17)
CRP SERPL-MCNC: <3 MG/L
DEPRECATED HCO3 PLAS-SCNC: 27 MMOL/L (ref 22–29)
EOSINOPHIL # BLD AUTO: 0.1 10E3/UL (ref 0–0.7)
EOSINOPHIL NFR BLD AUTO: 1 %
ERYTHROCYTE [DISTWIDTH] IN BLOOD BY AUTOMATED COUNT: 12.9 % (ref 10–15)
GFR SERPL CREATININE-BSD FRML MDRD: >90 ML/MIN/1.73M2
GLUCOSE SERPL-MCNC: 82 MG/DL (ref 70–99)
GLUCOSE UR STRIP-MCNC: NEGATIVE MG/DL
HCT VFR BLD AUTO: 41.5 % (ref 40–53)
HGB BLD-MCNC: 14.3 G/DL (ref 13.3–17.7)
HGB UR QL STRIP: NEGATIVE
IMM GRANULOCYTES # BLD: 0 10E3/UL
IMM GRANULOCYTES NFR BLD: 0 %
KETONES UR STRIP-MCNC: NEGATIVE MG/DL
LEUKOCYTE ESTERASE UR QL STRIP: NEGATIVE
LYMPHOCYTES # BLD AUTO: 1.9 10E3/UL (ref 0.8–5.3)
LYMPHOCYTES NFR BLD AUTO: 30 %
MAGNESIUM SERPL-MCNC: 1.8 MG/DL (ref 1.7–2.3)
MCH RBC QN AUTO: 30.3 PG (ref 26.5–33)
MCHC RBC AUTO-ENTMCNC: 34.5 G/DL (ref 31.5–36.5)
MCV RBC AUTO: 88 FL (ref 78–100)
MONOCYTES # BLD AUTO: 0.6 10E3/UL (ref 0–1.3)
MONOCYTES NFR BLD AUTO: 10 %
MUCOUS THREADS #/AREA URNS LPF: PRESENT /LPF
NEUTROPHILS # BLD AUTO: 3.7 10E3/UL (ref 1.6–8.3)
NEUTROPHILS NFR BLD AUTO: 59 %
NITRATE UR QL: NEGATIVE
NRBC # BLD AUTO: 0 10E3/UL
NRBC BLD AUTO-RTO: 0 /100
PH UR STRIP: 6 [PH] (ref 4.7–8)
PLATELET # BLD AUTO: 291 10E3/UL (ref 150–450)
POTASSIUM SERPL-SCNC: 4.6 MMOL/L (ref 3.4–5.3)
PROT SERPL-MCNC: 7.1 G/DL (ref 6.4–8.3)
RBC # BLD AUTO: 4.72 10E6/UL (ref 4.4–5.9)
RBC URINE: 2 /HPF
SARS-COV-2 RNA RESP QL NAA+PROBE: NEGATIVE
SODIUM SERPL-SCNC: 139 MMOL/L (ref 136–145)
SP GR UR STRIP: 1.02 (ref 1–1.03)
SQUAMOUS EPITHELIAL: 0 /HPF
UROBILINOGEN UR STRIP-MCNC: NORMAL MG/DL
WBC # BLD AUTO: 6.3 10E3/UL (ref 4–11)
WBC URINE: 1 /HPF

## 2022-12-19 PROCEDURE — 85025 COMPLETE CBC W/AUTO DIFF WBC: CPT | Performed by: PHYSICIAN ASSISTANT

## 2022-12-19 PROCEDURE — 96374 THER/PROPH/DIAG INJ IV PUSH: CPT | Mod: XU | Performed by: PHYSICIAN ASSISTANT

## 2022-12-19 PROCEDURE — 96375 TX/PRO/DX INJ NEW DRUG ADDON: CPT | Performed by: PHYSICIAN ASSISTANT

## 2022-12-19 PROCEDURE — 83735 ASSAY OF MAGNESIUM: CPT | Performed by: PHYSICIAN ASSISTANT

## 2022-12-19 PROCEDURE — 99285 EMERGENCY DEPT VISIT HI MDM: CPT | Mod: 25,CS | Performed by: PHYSICIAN ASSISTANT

## 2022-12-19 PROCEDURE — U0005 INFEC AGEN DETEC AMPLI PROBE: HCPCS | Performed by: PHYSICIAN ASSISTANT

## 2022-12-19 PROCEDURE — 81001 URINALYSIS AUTO W/SCOPE: CPT | Performed by: PHYSICIAN ASSISTANT

## 2022-12-19 PROCEDURE — 86140 C-REACTIVE PROTEIN: CPT | Performed by: PHYSICIAN ASSISTANT

## 2022-12-19 PROCEDURE — 250N000011 HC RX IP 250 OP 636: Performed by: PHYSICIAN ASSISTANT

## 2022-12-19 PROCEDURE — 258N000003 HC RX IP 258 OP 636: Performed by: PHYSICIAN ASSISTANT

## 2022-12-19 PROCEDURE — 99284 EMERGENCY DEPT VISIT MOD MDM: CPT | Mod: CS | Performed by: PHYSICIAN ASSISTANT

## 2022-12-19 PROCEDURE — 82310 ASSAY OF CALCIUM: CPT | Performed by: PHYSICIAN ASSISTANT

## 2022-12-19 PROCEDURE — 72148 MRI LUMBAR SPINE W/O DYE: CPT

## 2022-12-19 PROCEDURE — 96361 HYDRATE IV INFUSION ADD-ON: CPT | Performed by: PHYSICIAN ASSISTANT

## 2022-12-19 PROCEDURE — 36415 COLL VENOUS BLD VENIPUNCTURE: CPT | Performed by: PHYSICIAN ASSISTANT

## 2022-12-19 PROCEDURE — 96376 TX/PRO/DX INJ SAME DRUG ADON: CPT | Performed by: PHYSICIAN ASSISTANT

## 2022-12-19 PROCEDURE — C9803 HOPD COVID-19 SPEC COLLECT: HCPCS | Performed by: PHYSICIAN ASSISTANT

## 2022-12-19 RX ORDER — SODIUM CHLORIDE 9 MG/ML
INJECTION, SOLUTION INTRAVENOUS CONTINUOUS
Status: DISCONTINUED | OUTPATIENT
Start: 2022-12-19 | End: 2022-12-19 | Stop reason: HOSPADM

## 2022-12-19 RX ORDER — PREDNISONE 20 MG/1
TABLET ORAL
Qty: 15 TABLET | Refills: 0 | Status: SHIPPED | OUTPATIENT
Start: 2022-12-19 | End: 2023-09-05

## 2022-12-19 RX ORDER — METHYLPREDNISOLONE SODIUM SUCCINATE 125 MG/2ML
125 INJECTION, POWDER, LYOPHILIZED, FOR SOLUTION INTRAMUSCULAR; INTRAVENOUS ONCE
Status: COMPLETED | OUTPATIENT
Start: 2022-12-19 | End: 2022-12-19

## 2022-12-19 RX ORDER — IBUPROFEN 800 MG/1
800 TABLET, FILM COATED ORAL EVERY 8 HOURS PRN
Qty: 60 TABLET | Refills: 0 | Status: SHIPPED | OUTPATIENT
Start: 2022-12-19 | End: 2022-12-19

## 2022-12-19 RX ORDER — FENTANYL CITRATE 50 UG/ML
50 INJECTION, SOLUTION INTRAMUSCULAR; INTRAVENOUS EVERY 30 MIN PRN
Status: DISCONTINUED | OUTPATIENT
Start: 2022-12-19 | End: 2022-12-19 | Stop reason: HOSPADM

## 2022-12-19 RX ORDER — IBUPROFEN 800 MG/1
800 TABLET, FILM COATED ORAL EVERY 8 HOURS PRN
Qty: 60 TABLET | Refills: 0 | Status: SHIPPED | OUTPATIENT
Start: 2022-12-19 | End: 2023-09-27

## 2022-12-19 RX ADMIN — FENTANYL CITRATE 50 MCG: 50 INJECTION INTRAMUSCULAR; INTRAVENOUS at 14:31

## 2022-12-19 RX ADMIN — FENTANYL CITRATE 50 MCG: 50 INJECTION INTRAMUSCULAR; INTRAVENOUS at 16:25

## 2022-12-19 RX ADMIN — FENTANYL CITRATE 50 MCG: 50 INJECTION INTRAMUSCULAR; INTRAVENOUS at 12:15

## 2022-12-19 RX ADMIN — METHYLPREDNISOLONE SODIUM SUCCINATE 125 MG: 125 INJECTION, POWDER, FOR SOLUTION INTRAMUSCULAR; INTRAVENOUS at 16:23

## 2022-12-19 RX ADMIN — SODIUM CHLORIDE: 9 INJECTION, SOLUTION INTRAVENOUS at 12:15

## 2022-12-19 ASSESSMENT — ENCOUNTER SYMPTOMS
FLANK PAIN: 0
ABDOMINAL PAIN: 0
TREMORS: 0
FACIAL SWELLING: 0
NAUSEA: 0
AGITATION: 0
FACIAL ASYMMETRY: 0
WEAKNESS: 1
SEIZURES: 0
NUMBNESS: 1
EYE PAIN: 0
CONSTIPATION: 0
CONFUSION: 0
VOMITING: 0
FEVER: 0
BACK PAIN: 1
CHILLS: 0
STRIDOR: 0
NECK PAIN: 0

## 2022-12-19 ASSESSMENT — ACTIVITIES OF DAILY LIVING (ADL)
ADLS_ACUITY_SCORE: 35

## 2022-12-19 NOTE — ED TRIAGE NOTES
Lifted ladder 1 week ago put out back. Pain has increased limited ROM. CMS intact.mom made him come in thinks he pulled something.  pain radiating down right leg pain radiates to groin and pain in urination     Triage Assessment     Row Name 12/19/22 1104       Triage Assessment (Adult)    Airway WDL WDL       Respiratory WDL    Respiratory WDL WDL       Skin Circulation/Temperature WDL    Skin Circulation/Temperature WDL WDL       Cardiac WDL    Cardiac WDL WDL       Peripheral/Neurovascular WDL    Peripheral Neurovascular WDL WDL       Cognitive/Neuro/Behavioral WDL    Cognitive/Neuro/Behavioral WDL WDL

## 2022-12-19 NOTE — ED PROVIDER NOTES
History     Chief Complaint   Patient presents with     Back Pain     HPI  Eddi Trejo is a 32 year old male who reports he was lifting a ladder about a week ago when he felt a sudden pop in his back area.  He thinks he may have pulled something.  He has pain radiating down his right leg with some numbness and tingling to his buttocks and inside groin area in the saddle formation.  Furthermore he has some abdominal pain.  Denies any loss of bowel or bladder control but he does have difficulty urinating due to pain.      Allergies:  Allergies   Allergen Reactions     Penicillins      Patient denies difficulty breathing with reaction. He reports a mild reaction and has previously tolerated keflex without difficulty.        Problem List:    There are no problems to display for this patient.       Past Medical History:    Past Medical History:   Diagnosis Date     Bronchitis 02     Headache 4/28/10     Open wound of forehead 4/28/10     Otitis 02     Schizophrenia (H) 4/28/10     URI (upper respiratory infection) 95       Past Surgical History:    Past Surgical History:   Procedure Laterality Date     ARTHROSCOPY KNEE Left 11/10/2017    Procedure: ARTHROSCOPY KNEE;  Left Knee Arthroscopy with Menical Repair;  Surgeon: Jp Pritchard DO;  Location: [facility] OR     ARTHROSCOPY KNEE Left 2019    Procedure: LEFT KNEE ARTHROSCOPY;  Surgeon: Noel Moralez MD;  Location: [facility] OR     [facility] CREATE EARDRUM OPENING,GEN ANESTH      PE tubes as child       Family History:    Family History   Problem Relation Age of Onset     Asthma Mother      Colon Cancer Maternal Grandfather          at 58     Breast Cancer Maternal Grandmother      Diabetes Paternal Grandmother      Diabetes Paternal Aunt      Diabetes Maternal Aunt      C.A.D. No family hx of      Hypertension No family hx of        Social History:  Marital Status:  Single [1]  Social History     Tobacco Use     Smoking status: Never      Smokeless tobacco: Former     Types: Chew     Tobacco comments:     pt denied quit plan 4/16/19   Substance Use Topics     Alcohol use: Yes     Drug use: Yes     Types: Marijuana        Medications:    atomoxetine (STRATTERA) 10 MG capsule  escitalopram (LEXAPRO) 20 MG tablet  gabapentin (NEURONTIN) 800 MG tablet  HYDROcodone-acetaminophen (NORCO) 5-325 MG tablet  HYDROcodone-acetaminophen (NORCO) 5-325 MG tablet  methylphenidate (RITALIN) 20 MG tablet          Review of Systems   Constitutional: Negative for chills and fever.   HENT: Negative for drooling and facial swelling.    Eyes: Negative for pain and visual disturbance.   Respiratory: Negative for stridor.    Cardiovascular: Negative for chest pain.   Gastrointestinal: Negative for abdominal pain, constipation, nausea and vomiting.   Genitourinary: Negative for flank pain.   Musculoskeletal: Positive for back pain. Negative for neck pain.   Skin: Negative for pallor.   Neurological: Positive for weakness and numbness. Negative for tremors, seizures and facial asymmetry.   Psychiatric/Behavioral: Negative for agitation and confusion.   All other systems reviewed and are negative.      Physical Exam          Physical Exam  Vitals and nursing note reviewed.   Constitutional:       General: He is not in acute distress.     Appearance: Normal appearance. He is not ill-appearing or toxic-appearing.   HENT:      Head: Normocephalic.      Nose: Nose normal.   Eyes:      General: No scleral icterus.     Extraocular Movements: Extraocular movements intact.   Neck:      Trachea: No tracheal deviation.   Cardiovascular:      Rate and Rhythm: Normal rate.   Pulmonary:      Effort: Pulmonary effort is normal. No respiratory distress.   Musculoskeletal:         General: Normal range of motion.      Cervical back: Normal range of motion.      Comments: Tenderness to palpate L3-S1 area.  Minimal muscle spasm noted.  He states he has decreased sensation to his buttocks and  bilateral groin area.  He is severe pain with any movement of his lower extremities and has difficulty raising them off the bed initially.  Otherwise he has good distal pulses, good capillary refill and good sensation to his distal feet area.  No foot drop.   Skin:     General: Skin is warm and dry.      Coloration: Skin is not jaundiced or pale.   Neurological:      General: No focal deficit present.      Mental Status: He is alert and oriented to person, place, and time.   Psychiatric:         Attention and Perception: Attention normal.         Mood and Affect: Mood normal.         ED Course         Results for orders placed or performed during the hospital encounter of 12/19/22 (from the past 24 hour(s))   CBC with platelets differential    Narrative    The following orders were created for panel order CBC with platelets differential.  Procedure                               Abnormality         Status                     ---------                               -----------         ------                     CBC with platelets and d...[[id number]]                      Final result                 Please view results for these tests on the individual orders.   Comprehensive metabolic panel   Result Value Ref Range    Sodium 139 136 - 145 mmol/L    Potassium 4.6 3.4 - 5.3 mmol/L    Chloride 102 98 - 107 mmol/L    Carbon Dioxide (CO2) 27 22 - 29 mmol/L    Anion Gap 10 7 - 15 mmol/L    Urea Nitrogen 10.6 6.0 - 20.0 mg/dL    Creatinine 1.06 0.67 - 1.17 mg/dL    Calcium 9.6 8.6 - 10.0 mg/dL    Glucose 82 70 - 99 mg/dL    Alkaline Phosphatase 43 40 - 129 U/L    AST 27 10 - 50 U/L    ALT 20 10 - 50 U/L    Protein Total 7.1 6.4 - 8.3 g/dL    Albumin 4.4 3.5 - 5.2 g/dL    Bilirubin Total 0.3 <=1.2 mg/dL    GFR Estimate >90 >60 mL/min/1.73m2    Narrative    Slight hemolysis   Magnesium   Result Value Ref Range    Magnesium 1.8 1.7 - 2.3 mg/dL   CRP inflammation   Result Value Ref Range    CRP Inflammation <3.00 <5.00 mg/L    CBC with platelets and differential   Result Value Ref Range    WBC Count 6.3 4.0 - 11.0 10e3/uL    RBC Count 4.72 4.40 - 5.90 10e6/uL    Hemoglobin 14.3 13.3 - 17.7 g/dL    Hematocrit 41.5 40.0 - 53.0 %    MCV 88 78 - 100 fL    MCH 30.3 26.5 - 33.0 pg    MCHC 34.5 31.5 - 36.5 g/dL    RDW 12.9 10.0 - 15.0 %    Platelet Count 291 150 - 450 10e3/uL    % Neutrophils 59 %    % Lymphocytes 30 %    % Monocytes 10 %    % Eosinophils 1 %    % Basophils 0 %    % Immature Granulocytes 0 %    NRBCs per 100 WBC 0 <1 /100    Absolute Neutrophils 3.7 1.6 - 8.3 10e3/uL    Absolute Lymphocytes 1.9 0.8 - 5.3 10e3/uL    Absolute Monocytes 0.6 0.0 - 1.3 10e3/uL    Absolute Eosinophils 0.1 0.0 - 0.7 10e3/uL    Absolute Basophils 0.0 0.0 - 0.2 10e3/uL    Absolute Immature Granulocytes 0.0 <=0.4 10e3/uL    Absolute NRBCs 0.0 10e3/uL   UA with Microscopic reflex to Culture    Specimen: Urine, Midstream   Result Value Ref Range    Color Urine Yellow Colorless, Straw, Light Yellow, Yellow    Appearance Urine Clear Clear    Glucose Urine Negative Negative mg/dL    Bilirubin Urine Negative Negative    Ketones Urine Negative Negative mg/dL    Specific Gravity Urine 1.021 1.003 - 1.035    Blood Urine Negative Negative    pH Urine 6.0 4.7 - 8.0    Protein Albumin Urine 10 (A) Negative mg/dL    Urobilinogen Urine Normal Normal, 2.0 mg/dL    Nitrite Urine Negative Negative    Leukocyte Esterase Urine Negative Negative    Mucus Urine Present (A) None Seen /LPF    RBC Urine 2 <=2 /HPF    WBC Urine 1 <=5 /HPF    Squamous Epithelials Urine 0 <=1 /HPF    Narrative    Urine Culture not indicated   Lumbar spine MRI w/o contrast    Narrative    MR LUMBAR SPINE W/O CONTRAST    HISTORY: saddle paresthesia. .     TECHNIQUE: Sagittal T1, T2 and STIR as well as axial T2 images of the  lumbar spine were obtained.    COMPARISON: None.    FINDINGS:      The lumbar lordosis is straightened. The vertebral body heights are  preserved. No suspicious marrow lesion  is identified.    The distal cord and conus medullaris have a normal caliber and  morphology. The conus terminates at L2. No abnormal cord signal is  seen in the distal cord or conus. There are no masses in the spinal  canal or paravertebral soft tissues.    At L1-2, the central spinal canal and neural foramina are patent.    At L2-3, there is a moderate symmetric disc bulge with a superimposed  shallow caudal midline extrusion and mild facet degenerative  hypertrophy. Spinal stenosis is mild-to-moderate. Foraminal stenoses  are mild.    At L3-4, there is a moderate symmetric disc bulge with a superimposed  shallow caudal midline extrusion and mild facet degenerative  hypertrophy. Spinal stenosis is moderate. Bilateral subarticular zone  narrowing is seen. Foraminal stenoses are moderate.    At L4-5, there is a bulky symmetric disc bulge with a superimposed  central and right subarticular shallow caudal extrusion. There is  moderate facet degenerative hypertrophy. Spinal stenosis is moderate  to severe. Bilateral subarticular zone stenoses are present. Foraminal  stenoses are moderate.    At L5-S1, there is a moderate asymmetric left disc bulge with moderate  facet degenerative hypertrophy. Spinal and right foraminal narrowing  are mild. There is moderate left foraminal stenosis.      Impression    IMPRESSION:    Diffuse degenerative changes of the lumbar spine as detailed above.  Spinal stenosis is moderate to severe at L4-5. Potential impingement  of multiple nerve roots within the L2-3, L3-4 and L4-5 subarticular  zones as well as the left L5-S1 neural foramen.    ERICA GODINEZ MD         SYSTEM ID:  [id number]   Asymptomatic COVID-19 Virus (Coronavirus) by PCR Nasopharyngeal    Specimen: Nasopharyngeal; Swab   Result Value Ref Range    SARS CoV2 PCR Negative Negative    Narrative    Testing was performed using the Xpert Xpress SARS-CoV-2 Assay on the Cepheid Gene-Xpert Instrument Systems. Additional  information about this Emergency Use Authorization (EUA) assay can be found via the Lab Guide. This test should be ordered for the detection of SARS-CoV-2 in individuals who meet SARS-CoV-2 clinical and/or epidemiological criteria as well as from individuals without symptoms or other reasons to suspect COVID-19. Test performance for asymptomatic patients has only been established in anterior nasal swab specimens. This test is for in vitro diagnostic use under the FDA EUA for laboratories certified under CLIA to perform high complexity testing. This test has not been FDA cleared or approved. A negative result does not rule out the presence of PCR inhibitors in the specimen or target RNA concentration below the limit of detection for the assay. The possibility of a false negative should be considered if the patient's recent exposure or clinical presentation suggests COVID-19. This test was validated by [facility] laboratory. This laboratory is certified under the Clinical Laboratory Improvement Amendments (CLIA) as qualified to perform high complexity testing.       Medications   sodium chloride 0.9% infusion (0 mLs Intravenous Stopped 12/19/22 1446)   fentaNYL (PF) (SUBLIMAZE) injection 50 mcg (50 mcg Intravenous Given 12/19/22 1625)   methylPREDNISolone sodium succinate (solu-MEDROL) injection 125 mg (125 mg Intravenous Given 12/19/22 1623)       Assessments & Plan (with Medical Decision Making)     I have reviewed the nursing notes.    I have reviewed the findings, diagnosis, plan and need for follow up with the patient.      New Prescriptions    No medications on file       Final diagnoses:   Severe lumbar pain   DDD (degenerative disc disease), lumbar   Difficulty urinating   Paresthesia of bilateral legs - groin areas     Eddi Trejo is a 32 year old male who reports he was lifting a ladder about a week ago when he felt a sudden pop in his back area.  He thinks he may have pulled  something.  He has pain radiating down his right leg with some numbness and tingling to his buttocks and inside groin area in the saddle formation.  Furthermore he has some abdominal pain.  Denies any loss of bowel or bladder control but he does have difficulty urinating due to pain.  Physical exam shows Tenderness to palpate L3-S1 area.  Minimal muscle spasm noted.  He states he has decreased sensation to his buttocks and bilateral groin area.  He is severe pain with any movement of his lower extremities and has difficulty raising them off the bed initially.  Otherwise he has good distal pulses, good capillary refill and good sensation to his distal feet area.  No foot drop.  MRI ordered but will be delayed 4 hours.  IV established and he was given fentanyl initially for pain relief.    -- CBC showed normal white blood cells no left shift.  CMP is unremarkable.  CRP is normal.  Magnesium is normal.   He was given Solu-Medrol IM as well.   MRI of his lumbar area shows Diffuse degenerative changes of the lumbar spine as detailed above.  Spinal stenosis is moderate to severe at L4-5. Potential impingement  of multiple nerve roots within the L2-3, L3-4 and L4-5 subarticular zones as well as the left L5-S1 neural foramen.  CT of his lumbar  I had the images pushed to [facility] and Dr. Arthur,  a neurosurgeon reviewed the images.  He feels the symptoms this patient is exhibiting do not correspond with the imaging.   He advised admission with neurology evaluation but [facility] is currently on divert.  Our hospital is also on divert.  The patient will be placed on a waiting list.  I did discuss this case with Dr. Christianson, hospitalist at [facility] and the patient will be boarded here awaiting a medical surgical bed at [facility].  The patient does not want to be transported to [place].  He reports he is feeling much better after the steroids and he is noted to be ambulating around.  He has no pain with  urination at this point.  I reviewed his findings with Dr. Umana, neurologist who will attempt to arrange an outpatient evaluation of this patient in the next couple days.  The patient reports he needs to leave the hospital because his wife is in [place] and she is going to have a baby at any minute.  Rx for prednisone taper and Motrin.  Referral to PT.  Neurology referral.  Return sooner if there is any other concerns problems or questions.  I explained my diagnostic considerations and recommendations and the patient voiced an understanding and was in agreement with the treatment plan. All questions were answered to the best of my ability.  We discussed potential side effects of any prescribed or recommended therapies, as well as expectations for response to treatments.            .12/19/2022   [facility]     Kaden Tay PA-C  12/19/22 1910

## 2023-01-04 DIAGNOSIS — Z79.899 ENCOUNTER FOR LONG-TERM (CURRENT) USE OF MEDICATIONS: Primary | ICD-10-CM

## 2023-05-20 ENCOUNTER — HOSPITAL ENCOUNTER (EMERGENCY)
Facility: HOSPITAL | Age: 33
Discharge: HOME | End: 2023-05-20
Attending: PHYSICIAN ASSISTANT | Admitting: PHYSICIAN ASSISTANT
Payer: COMMERCIAL

## 2023-05-20 VITALS
TEMPERATURE: 98.7 F | DIASTOLIC BLOOD PRESSURE: 98 MMHG | OXYGEN SATURATION: 97 % | HEART RATE: 75 BPM | RESPIRATION RATE: 18 BRPM | SYSTOLIC BLOOD PRESSURE: 150 MMHG

## 2023-05-20 DIAGNOSIS — K04.7 DENTAL ABSCESS: ICD-10-CM

## 2023-05-20 PROCEDURE — 10060 I&D ABSCESS SIMPLE/SINGLE: CPT

## 2023-05-20 PROCEDURE — 41800 DRAINAGE OF GUM LESION: CPT

## 2023-05-20 PROCEDURE — 2894A PR VOIDCORRECT: CPT | Performed by: PHYSICIAN ASSISTANT

## 2023-05-20 PROCEDURE — 999N000104 HC STATISTIC NO CHARGE

## 2023-05-20 PROCEDURE — 41800 DRAINAGE OF GUM LESION: CPT | Performed by: PHYSICIAN ASSISTANT

## 2023-05-20 PROCEDURE — 250N000013 HC RX MED GY IP 250 OP 250 PS 637: Performed by: PHYSICIAN ASSISTANT

## 2023-05-20 RX ORDER — AMOXICILLIN 875 MG
875 TABLET ORAL 2 TIMES DAILY
Qty: 14 TABLET | Refills: 0 | Status: SHIPPED | OUTPATIENT
Start: 2023-05-20 | End: 2023-05-27

## 2023-05-20 RX ORDER — AMOXICILLIN 500 MG/1
500 CAPSULE ORAL ONCE
Status: COMPLETED | OUTPATIENT
Start: 2023-05-20 | End: 2023-05-20

## 2023-05-20 RX ADMIN — AMOXICILLIN 500 MG: 500 CAPSULE ORAL at 21:10

## 2023-05-20 ASSESSMENT — ACTIVITIES OF DAILY LIVING (ADL): ADLS_ACUITY_SCORE: 35

## 2023-05-21 PROCEDURE — 2894A -INCISION/DRAINAGE: CPT | Performed by: PHYSICIAN ASSISTANT

## 2023-05-21 NOTE — DISCHARGE INSTRUCTIONS
Take the Amoxicillin as prescribed for your dental infection.  Continue warm water rinses to keep abscess open and draining.   Return here with any new or worsening symptoms.   Follow up with dentist ASAP.

## 2023-07-20 ENCOUNTER — TRANSFERRED RECORDS (OUTPATIENT)
Dept: HEALTH INFORMATION MANAGEMENT | Facility: CLINIC | Age: 33
End: 2023-07-20

## 2023-09-05 ENCOUNTER — HOSPITAL ENCOUNTER (EMERGENCY)
Facility: HOSPITAL | Age: 33
Discharge: HOME | End: 2023-09-05
Payer: COMMERCIAL

## 2023-09-05 ENCOUNTER — APPOINTMENT (OUTPATIENT)
Dept: GENERAL RADIOLOGY | Facility: HOSPITAL | Age: 33
End: 2023-09-05
Payer: COMMERCIAL

## 2023-09-05 ENCOUNTER — APPOINTMENT (OUTPATIENT)
Dept: MRI IMAGING | Facility: HOSPITAL | Age: 33
End: 2023-09-05
Payer: COMMERCIAL

## 2023-09-05 VITALS
OXYGEN SATURATION: 98 % | RESPIRATION RATE: 16 BRPM | HEART RATE: 82 BPM | TEMPERATURE: 98.3 F | SYSTOLIC BLOOD PRESSURE: 122 MMHG | DIASTOLIC BLOOD PRESSURE: 70 MMHG

## 2023-09-05 DIAGNOSIS — M48.061 SPINAL STENOSIS OF LUMBAR REGION: ICD-10-CM

## 2023-09-05 PROCEDURE — 72100 X-RAY EXAM L-S SPINE 2/3 VWS: CPT

## 2023-09-05 PROCEDURE — 96372 THER/PROPH/DIAG INJ SC/IM: CPT

## 2023-09-05 PROCEDURE — 72148 MRI LUMBAR SPINE W/O DYE: CPT

## 2023-09-05 PROCEDURE — 72070 X-RAY EXAM THORAC SPINE 2VWS: CPT

## 2023-09-05 PROCEDURE — 250N000013 HC RX MED GY IP 250 OP 250 PS 637

## 2023-09-05 PROCEDURE — 99214 OFFICE O/P EST MOD 30 MIN: CPT

## 2023-09-05 PROCEDURE — 250N000011 HC RX IP 250 OP 636: Mod: JZ

## 2023-09-05 PROCEDURE — G0463 HOSPITAL OUTPT CLINIC VISIT: HCPCS | Mod: 25

## 2023-09-05 RX ORDER — METHOCARBAMOL 500 MG/1
500 TABLET, FILM COATED ORAL ONCE
Status: COMPLETED | OUTPATIENT
Start: 2023-09-05 | End: 2023-09-05

## 2023-09-05 RX ORDER — KETOROLAC TROMETHAMINE 30 MG/ML
30 INJECTION, SOLUTION INTRAMUSCULAR; INTRAVENOUS ONCE
Status: COMPLETED | OUTPATIENT
Start: 2023-09-05 | End: 2023-09-05

## 2023-09-05 RX ORDER — METHOCARBAMOL 750 MG/1
750 TABLET, FILM COATED ORAL 4 TIMES DAILY PRN
Qty: 20 TABLET | Refills: 0 | Status: SHIPPED | OUTPATIENT
Start: 2023-09-05 | End: 2023-09-27

## 2023-09-05 RX ORDER — LIDOCAINE 4 G/G
1 PATCH TOPICAL ONCE
Status: DISCONTINUED | OUTPATIENT
Start: 2023-09-05 | End: 2023-09-05 | Stop reason: HOSPADM

## 2023-09-05 RX ORDER — METHOCARBAMOL 500 MG/1
500 TABLET, FILM COATED ORAL 4 TIMES DAILY
Status: DISCONTINUED | OUTPATIENT
Start: 2023-09-05 | End: 2023-09-05

## 2023-09-05 RX ADMIN — METHOCARBAMOL 500 MG: 500 TABLET, FILM COATED ORAL at 16:06

## 2023-09-05 RX ADMIN — LIDOCAINE 1 PATCH: 4 PATCH TOPICAL at 15:52

## 2023-09-05 RX ADMIN — KETOROLAC TROMETHAMINE 30 MG: 30 INJECTION, SOLUTION INTRAMUSCULAR; INTRAVENOUS at 15:34

## 2023-09-05 ASSESSMENT — ENCOUNTER SYMPTOMS
SHORTNESS OF BREATH: 0
ACTIVITY CHANGE: 1
NUMBNESS: 1
APPETITE CHANGE: 0
FEVER: 0
JOINT SWELLING: 0
DIZZINESS: 0
COUGH: 0
COLOR CHANGE: 0
NECK PAIN: 0
ABDOMINAL PAIN: 0
VOMITING: 0
LIGHT-HEADEDNESS: 0
DIARRHEA: 0
NAUSEA: 0
FATIGUE: 0
CHILLS: 0
BACK PAIN: 1
HEADACHES: 0

## 2023-09-05 ASSESSMENT — ACTIVITIES OF DAILY LIVING (ADL): ADLS_ACUITY_SCORE: 35

## 2023-09-05 NOTE — DISCHARGE INSTRUCTIONS
Please call orthopedics tomorrow to schedule an appointment.     You can take 650-1000mg of tylenol every 6 hours, max of 4000mg in 24 hours and 600-800mg of ibuprofen every 8 hours, max of 2400mg in 24 hours.     Ice for 15-20 minutes every 2-3 hours. Please make sure to protect skin to prevent frost bite.     Return with any increased pain, inability to bear weight, leaking or urine or stool, numbness to groin, or other concerns.

## 2023-09-05 NOTE — ED TRIAGE NOTES
Patient presents to urgent care for chronic left hip and back pain that flared up about four days ago.  Patient states tried ibuprofen with no relief. Patient reports that he feels like his him his displaced. Pain 6-8/10.  Will need a work note.     Triage Assessment       Row Name 09/05/23 1509       Triage Assessment (Adult)    Airway WDL WDL       Respiratory WDL    Respiratory WDL WDL       Skin Circulation/Temperature WDL    Skin Circulation/Temperature WDL WDL       Cardiac WDL    Cardiac WDL WDL       Peripheral/Neurovascular WDL    Peripheral Neurovascular WDL WDL       Cognitive/Neuro/Behavioral WDL    Cognitive/Neuro/Behavioral WDL WDL

## 2023-09-05 NOTE — Clinical Note
Eddi Trejo was seen and treated in our emergency department on 9/5/2023.  He may return to work on 09/12/2023.       If you have any questions or concerns, please don't hesitate to call.      Berenice Troncoso, NP

## 2023-09-05 NOTE — ED PROVIDER NOTES
History     Chief Complaint   Patient presents with    Hip Pain     \    Back Pain     HPI  Eddi Trejo is a 33 year old male who presents to the urgent care with a 4 day history of an exacerbation of chronic back pain that radiates into left hip after moving furniture at work. He denies falls and injuries. He is able to bear weight and ambulate. Denies loss or retention of bowel or bladder, does have some pain in back while having a bowel movement. He does notes some numbness from buttocks radiating left and into left leg, is able to feel palpation. He denies fevers, chills, dysuria, hematuria, abd pain, chest pain, shortness of breath, and n/v/d. He had an MRI on 12/2022 showing degenerative changes to lumbar spine, spinal stenosis at L4-5, potential impingement of multiple nerve roots within the L2-3, L3-4, and L4-5 subarticular zones as well as the left L5-S1 neural foramen. He has not followed up with PCP or orthopedics since having the MRI.      Allergies:  Allergies   Allergen Reactions    Penicillins      Patient denies difficulty breathing with reaction. He reports a mild reaction and has previously tolerated keflex without difficulty.        Problem List:    There are no problems to display for this patient.       Past Medical History:    Past Medical History:   Diagnosis Date    Bronchitis 4/25/02    Headache 4/28/10    Open wound of forehead 4/28/10    Otitis 4/18/02    Schizophrenia (H) 4/28/10    URI (upper respiratory infection) 12/24/95       Past Surgical History:    Past Surgical History:   Procedure Laterality Date    ARTHROSCOPY KNEE Left 11/10/2017    Procedure: ARTHROSCOPY KNEE;  Left Knee Arthroscopy with Menical Repair;  Surgeon: Jp Pritchard DO;  Location: [facility]    ARTHROSCOPY KNEE Left 4/1/2019    Procedure: LEFT KNEE ARTHROSCOPY;  Surgeon: Noel Moralez MD;  Location: [facility] OR    [email or web address] CREATE EARDRUM OPENING,GEN ANESTH      PE tubes as child       Family History:     Family History   Problem Relation Age of Onset    Asthma Mother     Colon Cancer Maternal Grandfather          at 58    Breast Cancer Maternal Grandmother     Diabetes Paternal Grandmother     Diabetes Paternal Aunt     Diabetes Maternal Aunt     C.A.D. No family hx of     Hypertension No family hx of        Social History:  Marital Status:  Single [1]  Social History     Tobacco Use    Smoking status: Never    Smokeless tobacco: Former     Types: Chew    Tobacco comments:     pt denied quit plan 19   Substance Use Topics    Alcohol use: Yes    Drug use: Yes     Types: Marijuana        Medications:    amphetamine-dextroamphetamine (ADDERALL) 30 MG tablet  atomoxetine (STRATTERA) 10 MG capsule  escitalopram (LEXAPRO) 20 MG tablet  gabapentin (NEURONTIN) 800 MG tablet  ibuprofen (ADVIL/MOTRIN) 800 MG tablet  methocarbamol (ROBAXIN) 750 MG tablet  PARoxetine (PAXIL) 10 MG tablet  VYVANSE 70 MG capsule  amphetamine-dextroamphetamine (ADDERALL) 30 MG tablet          Review of Systems   Constitutional:  Positive for activity change. Negative for appetite change, chills, fatigue and fever.   Respiratory:  Negative for cough and shortness of breath.    Cardiovascular:  Negative for chest pain.   Gastrointestinal:  Negative for abdominal pain, diarrhea, nausea and vomiting.   Musculoskeletal:  Positive for back pain. Negative for joint swelling and neck pain.   Skin:  Negative for color change, pallor and rash.   Neurological:  Positive for numbness. Negative for dizziness, light-headedness and headaches.   All other systems reviewed and are negative.      Physical Exam   BP: 127/77  Pulse: 85  Temp: 97.7  F (36.5  C)  Resp: 16  SpO2: 98 %      Physical Exam  Vitals and nursing note reviewed.   Constitutional:       General: He is not in acute distress.     Appearance: Normal appearance. He is ill-appearing. He is not toxic-appearing.   Cardiovascular:      Rate and Rhythm: Normal rate and regular rhythm.       Pulses: Normal pulses.      Heart sounds: Normal heart sounds. No murmur heard.  Pulmonary:      Effort: Pulmonary effort is normal. No respiratory distress.      Breath sounds: Normal breath sounds. No stridor. No wheezing or rales.   Musculoskeletal:         General: Tenderness present. No swelling, deformity or signs of injury.      Cervical back: No tenderness or bony tenderness. No pain with movement.      Thoracic back: Tenderness and bony tenderness present. No swelling, edema or lacerations. Decreased range of motion.      Lumbar back: Spasms, tenderness and bony tenderness present. No swelling, edema or lacerations. Decreased range of motion. Positive left straight leg raise test. Negative right straight leg raise test.      Right lower leg: No edema.      Left lower leg: No edema.      Comments: There is tenderness from T10 radiating down to coccyx and into left SI. Positive SLR on left, negative on right. He has good pulses and sensation bilaterally to lower extremities. Strength is reduced with dorsiflexion of left foot. There is no foot drop.     Skin:     General: Skin is warm and dry.      Capillary Refill: Capillary refill takes less than 2 seconds.      Coloration: Skin is not pale.      Findings: No erythema.   Neurological:      General: No focal deficit present.      Mental Status: He is alert and oriented to person, place, and time.      GCS: GCS eye subscore is 4. GCS verbal subscore is 5. GCS motor subscore is 6.      Motor: Weakness present.      Gait: Gait abnormal.   Psychiatric:         Mood and Affect: Mood normal.         Behavior: Behavior normal.         Thought Content: Thought content normal.         Judgment: Judgment normal.         ED Course                 Procedures           Results for orders placed or performed during the hospital encounter of 09/05/23 (from the past 24 hour(s))   Lumbar spine XR, 2-3 views    Narrative    XR LUMBAR SPINE 2/3 VIEWS, XR THORACIC SPINE 2  VIEWS    HISTORY: mid to lower back pain. .    COMPARISON: 12/19/2022.    TECHNIQUE: 3 views of the lumbosacral spine. 3 views of the thoracic  spine    FINDINGS:    No acute fracture. Preserved thoracic and lumbar vertebral body  heights. Multifocal disc height loss and facet hypertrophy, best seen  at L4-5 and L5-S1, similar in appearance to the prior MR from 2022.        Impression    IMPRESSION:     Lumbar predominant degenerative changes.      ERICA GODINEZ MD         SYSTEM ID:  [id number]   XR Thoracic Spine 2 Views    Narrative    XR LUMBAR SPINE 2/3 VIEWS, XR THORACIC SPINE 2 VIEWS    HISTORY: mid to lower back pain. .    COMPARISON: 12/19/2022.    TECHNIQUE: 3 views of the lumbosacral spine. 3 views of the thoracic  spine    FINDINGS:    No acute fracture. Preserved thoracic and lumbar vertebral body  heights. Multifocal disc height loss and facet hypertrophy, best seen  at L4-5 and L5-S1, similar in appearance to the prior MR from 2022.        Impression    IMPRESSION:     Lumbar predominant degenerative changes.      ERICA GODINEZ MD         SYSTEM ID:  [id number]   Lumbar spine MRI w/o contrast    Narrative    MR LUMBAR SPINE W/O CONTRAST    HISTORY: rule out cauda equina, leaking stool with increased back pain  x4 days    TECHNIQUE: Sagittal T1, T2 and STIR as well as axial T2 images of the  lumbar spine were obtained.    COMPARISON: 12/19/2022.    FINDINGS:      The lumbar lordosis is straightened. The vertebral body heights are  preserved. No suspicious marrow lesion is identified.    The distal cord and conus medullaris have a normal caliber and  morphology. The conus terminates at L2. No abnormal cord signal is  seen in the distal cord or conus. There are no masses in the spinal  canal or paravertebral soft tissues.    At L1-2, the central spinal canal and neural foramina are patent.    At L2-3, there is a mild symmetric disc bulge and mild facet  degenerative hypertrophy. Spinal  stenosis is mild-to-moderate.  Foraminal stenoses are mild.    At L3-4, there is a moderate symmetric disc bulge with a superimposed  shallow caudal midline extrusion and mild facet degenerative  hypertrophy. Spinal stenosis is moderate. Bilateral subarticular zone  narrowing is seen. Foraminal stenoses are mild to moderate.    At L4-5, there is a moderate symmetric disc bulge with an improved  central shallow caudal extrusion. There is moderate facet degenerative  hypertrophy. Spinal stenosis is mild to moderate. Foraminal stenoses  are moderate on the left and moderate to severe on the right.    At L5-S1, there is a moderate asymmetric left disc bulge with moderate  facet degenerative hypertrophy. Spinal and right foraminal narrowing  are mild. There is moderate left foraminal stenosis.      Impression    IMPRESSION:    Multilevel degenerative changes of the lumbar spine as detailed above.  Moderate spinal stenosis at L3-4, associated with bilateral  subarticular zone narrowing.     ERICA GODINEZ MD         SYSTEM ID:  [id number]       Medications   Lidocaine (LIDOCARE) 4 % Patch 1 patch (1 patch Transdermal $Patch/Med Applied 9/5/23 8655)   ketorolac (TORADOL) injection 30 mg (30 mg Intramuscular $Given 9/5/23 1534)   methocarbamol (ROBAXIN) tablet 500 mg (500 mg Oral $Given 9/5/23 1606)       Assessments & Plan (with Medical Decision Making)     I have reviewed the nursing notes.    I have reviewed the findings, diagnosis, plan and need for follow up with the patient.  Erica rTejo is a 33 year old male who presents to the urgent care with a 4 day history of an exacerbation of chronic back pain that radiates into left hip after moving furniture at work. He denies falls and injuries. He is able to bear weight and ambulate. Denies loss or retention of bowel or bladder, does have some pain in back while having a bowel movement. He does notes some numbness from buttocks radiating left and into left  leg, is able to feel palpation. He denies fevers, chills, dysuria, hematuria, abd pain, chest pain, shortness of breath, and n/v/d. He had an MRI on 12/2022 showing degenerative changes to lumbar spine, spinal stenosis at L4-5, potential impingement of multiple nerve roots within the L2-3, L3-4, and L4-5 subarticular zones as well as the left L5-S1 neural foramen. He has not followed up with PCP or orthopedics since having the MRI.      MDM: XR of lumbar spine, XR of thoracic spine:  MRI ordered to rule out cauda equina or MRI changes as he is having increased back pain radiating down left leg and has been leaking stool the last 4 days.   VSS and afebrile. Lungs clear, heart tones regular.     Consulted with Rachael RUIZ NP in the ED due to suspicion of possible cauda equina syndrome. Adrien is able to squeeze his gluteus together without difficulty. Discussed rectal exam to assess for rectal tone, but he declines and notes that he is not leaking stool. Has been having regular bowel movements with some discomfort. He is able to ambulate and bear weight. There is tenderness from T10 radiating down to coccyx and into left SI. Positive SLR on left, negative on right. He has good pulses and sensation bilaterally to lower extremities. Strength is reduced with dorsiflexion of left foot. There is no foot drop. On re-exam after results of images, Adrien now stating that he has been leaking stool the last 4 days since pain increased. Contacted MRI and able to do MRI today from the . Adrien made aware that since the prior auth cannot be performed, insurance may not cover. He would like to proceed with MRI.     MRI of lumbar spine showing Multilevel degenerative changes of the lumbar spine. Moderate spinal stenosis at L3-4, associated with bilateral subarticular zone narrowing, per radiologist.     Results reviewed with Dr. Abad in the ED. He is in agreement that Adrien can be discharged home. Referral placed for OA. MRI and  xray from today along with MRI from 12/2022 pushed to OA. Stressed the importance of calling [facility] tomorrow to schedule an appt. Return precautions also discussed.     Toradol injection, lidocaine patch, and robaxin given while in the UC with some reduction in pain. Pain a 4/10 at time of discharge. He is able to ambulate with less pain. He is in agreement with plan.     (M48.061) Spinal stenosis of lumbar region  Plan: Orthopedic  Referral  Please call orthopedics tomorrow to schedule an appointment.     You can take 650-1000mg of tylenol every 6 hours, max of 4000mg in 24 hours and 600-800mg of ibuprofen every 8 hours, max of 2400mg in 24 hours.     Ice for 15-20 minutes every 2-3 hours. Please make sure to protect skin to prevent frost bite.     Return with any increased pain, inability to bear weight, leaking or urine or stool, numbness to groin, or other concerns. Understanding verbalized.               Discharge Medication List as of 9/5/2023  6:51 PM        START taking these medications    Details   methocarbamol (ROBAXIN) 750 MG tablet Take 1 tablet (750 mg) by mouth 4 times daily as needed for muscle spasms, Disp-20 tablet, R-0, E-Prescribe             Final diagnoses:   Spinal stenosis of lumbar region       9/5/2023   [facility]       Berenice Troncoso NP  09/05/23 0328

## 2023-09-05 NOTE — ED TRIAGE NOTES
Patient has a chronic hip and back problem that flared up about four days ago.     Triage Assessment       Row Name 09/05/23 2778       Triage Assessment (Adult)    Airway WDL WDL       Respiratory WDL    Respiratory WDL WDL       Skin Circulation/Temperature WDL    Skin Circulation/Temperature WDL WDL       Cardiac WDL    Cardiac WDL WDL       Peripheral/Neurovascular WDL    Peripheral Neurovascular WDL WDL       Cognitive/Neuro/Behavioral WDL    Cognitive/Neuro/Behavioral WDL WDL

## 2023-09-27 ENCOUNTER — ANCILLARY PROCEDURE (OUTPATIENT)
Dept: GENERAL RADIOLOGY | Facility: OTHER | Age: 33
End: 2023-09-27
Attending: STUDENT IN AN ORGANIZED HEALTH CARE EDUCATION/TRAINING PROGRAM
Payer: COMMERCIAL

## 2023-09-27 ENCOUNTER — OFFICE VISIT (OUTPATIENT)
Dept: FAMILY MEDICINE | Facility: OTHER | Age: 33
End: 2023-09-27
Attending: STUDENT IN AN ORGANIZED HEALTH CARE EDUCATION/TRAINING PROGRAM
Payer: COMMERCIAL

## 2023-09-27 VITALS
WEIGHT: 188.31 LBS | HEART RATE: 77 BPM | DIASTOLIC BLOOD PRESSURE: 75 MMHG | TEMPERATURE: 98.4 F | SYSTOLIC BLOOD PRESSURE: 127 MMHG | OXYGEN SATURATION: 98 % | BODY MASS INDEX: 26.26 KG/M2

## 2023-09-27 DIAGNOSIS — F41.9 ANXIETY: ICD-10-CM

## 2023-09-27 DIAGNOSIS — R06.02 SOB (SHORTNESS OF BREATH): ICD-10-CM

## 2023-09-27 DIAGNOSIS — M25.552 HIP PAIN, LEFT: ICD-10-CM

## 2023-09-27 DIAGNOSIS — Z76.89 ENCOUNTER TO ESTABLISH CARE: Primary | ICD-10-CM

## 2023-09-27 DIAGNOSIS — R29.898 LEFT LEG WEAKNESS: ICD-10-CM

## 2023-09-27 DIAGNOSIS — F39 MOOD DISORDER (H): ICD-10-CM

## 2023-09-27 DIAGNOSIS — M48.061 SPINAL STENOSIS OF LUMBAR REGION WITHOUT NEUROGENIC CLAUDICATION: ICD-10-CM

## 2023-09-27 DIAGNOSIS — F90.9 ATTENTION DEFICIT HYPERACTIVITY DISORDER (ADHD), UNSPECIFIED ADHD TYPE: ICD-10-CM

## 2023-09-27 DIAGNOSIS — G89.29 CHRONIC LEFT-SIDED LOW BACK PAIN WITH LEFT-SIDED SCIATICA: ICD-10-CM

## 2023-09-27 DIAGNOSIS — Z87.09 HISTORY OF ASTHMA: ICD-10-CM

## 2023-09-27 DIAGNOSIS — M54.42 CHRONIC LEFT-SIDED LOW BACK PAIN WITH LEFT-SIDED SCIATICA: ICD-10-CM

## 2023-09-27 PROBLEM — Z79.899 MEDICAL MARIJUANA USE: Chronic | Status: ACTIVE | Noted: 2023-09-27

## 2023-09-27 PROBLEM — F10.21 HISTORY OF ALCOHOL DEPENDENCE (H): Status: ACTIVE | Noted: 2023-09-27

## 2023-09-27 PROBLEM — Z79.899 MEDICAL MARIJUANA USE: Status: ACTIVE | Noted: 2023-09-27

## 2023-09-27 PROBLEM — F10.21 HISTORY OF ALCOHOL DEPENDENCE (H): Chronic | Status: ACTIVE | Noted: 2023-09-27

## 2023-09-27 LAB
ALBUMIN SERPL BCG-MCNC: 4.8 G/DL (ref 3.5–5.2)
ALP SERPL-CCNC: 42 U/L (ref 40–129)
ALT SERPL W P-5'-P-CCNC: 36 U/L (ref 0–70)
ANION GAP SERPL CALCULATED.3IONS-SCNC: 10 MMOL/L (ref 7–15)
AST SERPL W P-5'-P-CCNC: 31 U/L (ref 0–45)
BASOPHILS # BLD AUTO: 0 10E3/UL (ref 0–0.2)
BASOPHILS NFR BLD AUTO: 0 %
BILIRUB SERPL-MCNC: 0.4 MG/DL
BUN SERPL-MCNC: 10.8 MG/DL (ref 6–20)
CALCIUM SERPL-MCNC: 10.1 MG/DL (ref 8.6–10)
CHLORIDE SERPL-SCNC: 104 MMOL/L (ref 98–107)
CREAT SERPL-MCNC: 1.06 MG/DL (ref 0.67–1.17)
DEPRECATED HCO3 PLAS-SCNC: 27 MMOL/L (ref 22–29)
EGFRCR SERPLBLD CKD-EPI 2021: >90 ML/MIN/1.73M2
EOSINOPHIL # BLD AUTO: 0.1 10E3/UL (ref 0–0.7)
EOSINOPHIL NFR BLD AUTO: 1 %
ERYTHROCYTE [DISTWIDTH] IN BLOOD BY AUTOMATED COUNT: 12.4 % (ref 10–15)
GLUCOSE SERPL-MCNC: 95 MG/DL (ref 70–99)
HCT VFR BLD AUTO: 45.1 % (ref 40–53)
HGB BLD-MCNC: 15.2 G/DL (ref 13.3–17.7)
IMM GRANULOCYTES # BLD: 0 10E3/UL
IMM GRANULOCYTES NFR BLD: 0 %
LYMPHOCYTES # BLD AUTO: 1.9 10E3/UL (ref 0.8–5.3)
LYMPHOCYTES NFR BLD AUTO: 19 %
MCH RBC QN AUTO: 30.2 PG (ref 26.5–33)
MCHC RBC AUTO-ENTMCNC: 33.7 G/DL (ref 31.5–36.5)
MCV RBC AUTO: 90 FL (ref 78–100)
MONOCYTES # BLD AUTO: 0.7 10E3/UL (ref 0–1.3)
MONOCYTES NFR BLD AUTO: 7 %
NEUTROPHILS # BLD AUTO: 7.2 10E3/UL (ref 1.6–8.3)
NEUTROPHILS NFR BLD AUTO: 73 %
NRBC # BLD AUTO: 0 10E3/UL
NRBC BLD AUTO-RTO: 0 /100
PLATELET # BLD AUTO: 314 10E3/UL (ref 150–450)
POTASSIUM SERPL-SCNC: 4.4 MMOL/L (ref 3.4–5.3)
PROT SERPL-MCNC: 7.7 G/DL (ref 6.4–8.3)
RBC # BLD AUTO: 5.03 10E6/UL (ref 4.4–5.9)
SODIUM SERPL-SCNC: 141 MMOL/L (ref 135–145)
TSH SERPL DL<=0.005 MIU/L-ACNC: 2.4 UIU/ML (ref 0.3–4.2)
WBC # BLD AUTO: 10 10E3/UL (ref 4–11)

## 2023-09-27 PROCEDURE — 99204 OFFICE O/P NEW MOD 45 MIN: CPT | Performed by: STUDENT IN AN ORGANIZED HEALTH CARE EDUCATION/TRAINING PROGRAM

## 2023-09-27 PROCEDURE — G0463 HOSPITAL OUTPT CLINIC VISIT: HCPCS

## 2023-09-27 PROCEDURE — 84443 ASSAY THYROID STIM HORMONE: CPT | Mod: ZL | Performed by: STUDENT IN AN ORGANIZED HEALTH CARE EDUCATION/TRAINING PROGRAM

## 2023-09-27 PROCEDURE — 85025 COMPLETE CBC W/AUTO DIFF WBC: CPT | Mod: ZL | Performed by: STUDENT IN AN ORGANIZED HEALTH CARE EDUCATION/TRAINING PROGRAM

## 2023-09-27 PROCEDURE — 73502 X-RAY EXAM HIP UNI 2-3 VIEWS: CPT | Mod: TC

## 2023-09-27 PROCEDURE — 80053 COMPREHEN METABOLIC PANEL: CPT | Mod: ZL | Performed by: STUDENT IN AN ORGANIZED HEALTH CARE EDUCATION/TRAINING PROGRAM

## 2023-09-27 PROCEDURE — 71046 X-RAY EXAM CHEST 2 VIEWS: CPT | Mod: TC

## 2023-09-27 PROCEDURE — 36415 COLL VENOUS BLD VENIPUNCTURE: CPT | Mod: ZL | Performed by: STUDENT IN AN ORGANIZED HEALTH CARE EDUCATION/TRAINING PROGRAM

## 2023-09-27 RX ORDER — BUDESONIDE AND FORMOTEROL FUMARATE DIHYDRATE 80; 4.5 UG/1; UG/1
AEROSOL RESPIRATORY (INHALATION)
Qty: 10.2 G | Refills: 0 | Status: SHIPPED | OUTPATIENT
Start: 2023-09-27 | End: 2024-01-02

## 2023-09-27 RX ORDER — ARIPIPRAZOLE 20 MG/1
20 TABLET ORAL DAILY
COMMUNITY
Start: 2023-03-28

## 2023-09-27 RX ORDER — HYDROXYZINE HYDROCHLORIDE 25 MG/1
25 TABLET, FILM COATED ORAL
COMMUNITY
Start: 2023-07-20

## 2023-09-27 ASSESSMENT — ANXIETY QUESTIONNAIRES
7. FEELING AFRAID AS IF SOMETHING AWFUL MIGHT HAPPEN: MORE THAN HALF THE DAYS
3. WORRYING TOO MUCH ABOUT DIFFERENT THINGS: MORE THAN HALF THE DAYS
1. FEELING NERVOUS, ANXIOUS, OR ON EDGE: MORE THAN HALF THE DAYS
IF YOU CHECKED OFF ANY PROBLEMS ON THIS QUESTIONNAIRE, HOW DIFFICULT HAVE THESE PROBLEMS MADE IT FOR YOU TO DO YOUR WORK, TAKE CARE OF THINGS AT HOME, OR GET ALONG WITH OTHER PEOPLE: SOMEWHAT DIFFICULT
2. NOT BEING ABLE TO STOP OR CONTROL WORRYING: MORE THAN HALF THE DAYS
6. BECOMING EASILY ANNOYED OR IRRITABLE: MORE THAN HALF THE DAYS
4. TROUBLE RELAXING: MORE THAN HALF THE DAYS
GAD7 TOTAL SCORE: 13
5. BEING SO RESTLESS THAT IT IS HARD TO SIT STILL: SEVERAL DAYS
GAD7 TOTAL SCORE: 13

## 2023-09-27 ASSESSMENT — PATIENT HEALTH QUESTIONNAIRE - PHQ9
10. IF YOU CHECKED OFF ANY PROBLEMS, HOW DIFFICULT HAVE THESE PROBLEMS MADE IT FOR YOU TO DO YOUR WORK, TAKE CARE OF THINGS AT HOME, OR GET ALONG WITH OTHER PEOPLE: VERY DIFFICULT
SUM OF ALL RESPONSES TO PHQ QUESTIONS 1-9: 10
SUM OF ALL RESPONSES TO PHQ QUESTIONS 1-9: 10

## 2023-09-27 ASSESSMENT — PAIN SCALES - GENERAL: PAINLEVEL: MODERATE PAIN (4)

## 2023-09-27 NOTE — RESULT ENCOUNTER NOTE
Please call and notify patient that his electrolytes, kidney function, and liver function look good.  Calcium is just slightly elevated at 10.1, normal is less than 10.  Make sure he is hydrating and we can recheck at follow-up.  Thyroid function is normal.  White blood cell count and hemoglobin are normal.

## 2023-09-27 NOTE — PATIENT INSTRUCTIONS
Try new symbicort inhaler - two puffs one daily plus 1-2 puffs as needed. Rinse mouth after use.   They will call to schedule lung testing.   We will recheck in one month.     Will check with Edda, Dr. Henning, psychiatry, to see if she could see you.   Please meet with gay in the mean time to discuss your medication concerns .    [facility]. A referral was placed for you to see [facility]. We will send your records to them. If you do not hear to schedule with them in 4-5 business days, please call [phone] to schedule and discuss with them. You can also call our clinic, [facility] at [phone].

## 2023-09-27 NOTE — COMMUNITY RESOURCES LIST (ENGLISH)
09/27/2023   [facility]  N/A  For questions about this resource list or additional care needs, please contact your primary care clinic or care manager.  Phone: [phone]   Email: N/A   Address: [address] [place] [address]   Hours: N/A        Food and Nutrition       Food pantry  1  [facility] [place] Distance: 0.23 miles      Pickup   [address] [place] [address]  Language: English  Hours: Mon 9:00 AM - 11:00 AM , Tue 1:00 PM - 3:00 PM , Wed - Thu 9:00 AM - 11:00 AM  Fees: Free, Self Pay   Phone: (546) 837-4229 Email: [email or web address] Website: [email or web address]/[place]     2  [facility] [employer] [facility] Free [facility] Distance: 19.86 miles      Pickup   [address] [place] [address]  Language: English  Hours: Mon - Sun Open 24 Hours  Fees: Free   Phone: (165) 977-1090 Email: [email or web address] Website: http://www.[employer]com.org     SNAP application assistance  3  [facility]  Economic Services & [facility] Distance: 0.63 miles      In-Person, Phone/Virtual   [address] [place] [address]  Language: English  Hours: Mon - Fri 8:00 AM - 4:30 PM  Fees: Free   Phone: (508) 720-3551 Website: https://www.[facility].gov/jirkgpzebxn-k-y/public-health-human-services/economic-services-supports     4  [facility] - Economic Services & [facility] Distance: 19.93 miles      In-Person, Phone/Virtual   [address] [place] [address]  Language: English  Hours: Mon - Fri 8:00 AM - 4:30 PM  Fees: Free   Phone: (869) 701-7388 Email: financialassistance@[facility].gov Website: https://[place].gov/rbzmtqyusuo-h-w/public-health-human-services/economic-services-supports     Soup kitchen or free meals  5  [facility] [place]  [facility] and Service [place] Distance: 0.23 miles      Pickup   [address] [place] [address]  Language: English  Hours: Mon - Fri 4:00 PM - 4:45 PM  Fees: Free   Phone: (161) 485-9114 Email: [email or web address] Website: [email or web address]/northern/[place]          Housing       Coordinated Entry access point  6  [facility] Administrative Office Distance: 19.86 miles      In-Person   [address] [place] [address]  Language: English  Hours: Mon - Fri 8:00 AM - 4:30 PM  Fees: Free   Phone: (493) 926-6951 Email: beatriz@[employer] Website: http://www.[employer].org     Housing search assistance  7  [facility] Distance: 2.27 miles      In-Person   [address] [place], [place] [id number]  Language: English  Hours: Mon - Sun Open 24 Hours  Fees: Free   Phone: (940) 977-7507 Website: http://www.[employer]     8  [facility] Administrative Office Distance: 19.86 miles      Phone/Virtual   [address] [place] [address]  Language: English  Hours: Mon - Fri 8:00 AM - 4:30 PM  Fees: Free   Phone: (585) 558-7337 Email: [email or web address] Website: http://www.[employer].org     Shelter for families  9  [facility] Distance: 2.27 miles      In-Person   [address] [place] [address]  Language: English  Hours: Mon - Sun Open 24 Hours  Fees: Free   Phone: (769) 935-2194 Website: http://www.[employer]     10  [facility] Distance: 20.06 miles      In-Person   [address] [place] [address]  Language: English  Hours: Mon - Sun Open 24 Hours  Fees: Free   Phone: (306) 255-9330 Website: http://www.[employer]     Shelter for individuals  11  [facility] Distance: 2.27 miles      In-Person   [address] [place] [address]  Language: English   Hours: Mon - Sun Open 24 Hours  Fees: Free   Phone: (422) 244-9345 Website: http://www.[employer]     12  [employer] - [facility] Distance: 20.06 miles      In-Person   [address] [place] [address]  Language: English  Hours: Mon - Sun Open 24 Hours  Fees: Free   Phone: (953) 661-7685 Website: http://www.[employer]          Important Numbers & Websites       Emergency Services   911  [facility] Services   311  Poison Control   (915) 754-9847  Suicide Prevention Lifeline   (224) 490-3580 (TALK)  Child Abuse Hotline   (227) 211-7239 (4-A-Child)  Sexual Assault Hotline   (107) 421-7069 (HOPE)  National Runaway Safeline   (659) 583-8528 (RUNAWAY)  All-Options Talkline   (859) 915-9473  Substance Abuse Referral   (417) 299-6823 (HELP)

## 2023-09-27 NOTE — COMMUNITY RESOURCES LIST (ENGLISH)
09/27/2023   [facility]  N/A  For questions about this resource list or additional care needs, please contact your primary care clinic or care manager.  Phone: [phone]   Email: N/A   Address: [address] [place] [address]   Hours: N/A        Food and Nutrition       Food pantry  1  [facility] and [facility] [place] Distance: 0.23 miles      Pickup   [address] [place] [address]  Language: English  Hours: Mon 9:00 AM - 11:00 AM , Tue 1:00 PM - 3:00 PM , Wed - Thu 9:00 AM - 11:00 AM  Fees: Free, Self Pay   Phone: (685) 187-7676 Email: [email or web address] Website: [email or web address]/[place]     2  [facility] [employer] [facility] Distance: 19.86 miles      Pickup   [address] [place] [address]  Language: English  Hours: Mon - Sun Open 24 Hours  Fees: Free   Phone: (478) 171-8037 Email: beatriz@[employer].org Website: http://www.[employer].org     SNAP application assistance  3  [facility]  Economic Services & [facility] Distance: 0.63 miles      In-Person, Phone/Virtual   [address] [place] [address]  Language: English  Hours: Mon - Fri 8:00 AM - 4:30 PM  Fees: Free   Phone: (208) 686-5875 Website: https://www.[facility].gov/ohxpwxwfumf-l-e/public-health-human-services/economic-services-supports     4  [facility] - Economic Services & [facility] Distance: 19.93 miles      In-Person, Phone/Virtual   [address] [place] [address]  Language: English  Hours: Mon - Fri 8:00 AM - 4:30 PM  Fees: Free   Phone: (988) 619-4331 Email: financialassistance@[facility].gov Website: https://www.[place].gov/sgbdkzhrplo-b-t/public-health-human-services/economic-services-supports     Soup kitchen or free meals  5  [facility] [place]  [facility] [place] Distance: 0.23 miles      Pickup   [address] [place] [address]  Language: English  Hours: Mon - Fri 4:00 PM - 4:45 PM  Fees: Free   Phone: (623) 177-6269 Email: [email or web address] Website: https://[facility]/northern/[place]          Housing       Coordinated Entry access point  6  [facility] Administrative Office Distance: 19.86 miles      In-Person   [address] [place] [address]  Language: English  Hours: Mon - Fri 8:00 AM - 4:30 PM  Fees: Free   Phone: (572) 836-6643 Email: beatriz@[employer] Website: http://www.[employer].org     Housing search assistance  7  [facility] Distance: 2.27 miles      In-Person   [address] [place], [place] [id number]  Language: English  Hours: Mon - Sun Open 24 Hours  Fees: Free   Phone: (614) 749-9535 Website: http://www.[employer]     8  [facility] Administrative Office Distance: 19.86 miles      Phone/Virtual   [address] [place] [address]  Language: English  Hours: Mon - Fri 8:00 AM - 4:30 PM  Fees: Free   Phone: (309) 320-2048 Email: beatriz@[employer] Website: http://www.[employer].org     Shelter for families  9  [facility] Distance: 2.27 miles      In-Person   [address] [place] [address]  Language: English  Hours: Mon - Sun Open 24 Hours  Fees: Free   Phone: (537) 620-4313 Website: http://www.[employer]     10  [facility] Distance: 20.06 miles      In-Person   [address] [place] [address]  Language: English  Hours: Mon - Sun Open 24 Hours  Fees: Free   Phone: (255) 411-9436 Website: http://www.[employer]     Shelter for individuals  11  [facility] Distance: 2.27 miles      In-Person   [address] [place] [address]  Language: English   Hours: Mon - Sun Open 24 Hours  Fees: Free   Phone: (987) 430-9511 Website: http://www.[facility]     12  [employer] - [facility] Distance: 20.06 miles      In-Person   [address] [place] [address]  Language: English  Hours: Mon - Sun Open 24 Hours  Fees: Free   Phone: (992) 346-9389 Website: http://www.[facility]          Important Numbers & Websites       Emergency Services   911  [facility] Services   311  Poison Control   (472) 194-5590  Suicide Prevention Lifeline   (158) 185-7693 (TALK)  Child Abuse Hotline   (590) 223-8994 (4-A-Child)  Sexual Assault Hotline   (761) 606-3822 (HOPE)  National Runaway Safeline   (100) 774-4320 (RUNAWAY)  All-Options Talkline   (271) 590-2078  Substance Abuse Referral   (145) 906-5647 (HELP)

## 2023-09-27 NOTE — PROGRESS NOTES
Assessment & Plan     Encounter to establish care  Medical, surgical, family, and social histories discussed updated. Reviewed active medications.     Mood disorder (H)  Anxiety  Unclear diagnosis - ?bipolar per patient.   Currently on abilify, lexapro, paxil, gabapentin [facility]   Will get MORRO to review further  He is interested in alternate psychiatrist - will see if our psychiatrist would be willing to take patient on once records are received    Attention deficit hyperactivity disorder (ADHD), unspecified ADHD type  [facility]   Did stop vyvanse on his own but continues on Strattera and quite high dose Adderall  Once records obtained, will reach out to psychiatrist here to see if she would be willing to take on patient     SOB (shortness of breath)  History of asthma  Intermittent, worse with cold air and history of asthma.  No signs of cardiac etiology/chest pain, pleuritic pain, or infection.   CXR reassuring today. CBC, CMP, TSH within normal limits.   Will trial symbicort inhaler 2 puffs in AM.   PFTs ordered.   Follow up in one month to reassess.   Concerning signs and symptoms reviewed that would indicate need for urgent re-evaluation. Patient stated understanding and agreement with the plan.   - CBC with Platelets & Differential; Future  - Comprehensive metabolic panel; Future  - TSH with free T4 reflex; Future  - XR CHEST 2 VW (Clinic Performed); Future  - budesonide-formoterol (SYMBICORT) 80-4.5 MCG/ACT Inhaler; Inhale 2 puffs once daily plus 1-2 puffs as needed. May use up to 12 puffs per day.  - General PFT Lab (Please always keep checked); Future  - Pulmonary Function Test; Future    Hip pain, left  Suspect radiculopathy from lumbar region/low back spinal stenosis.   Xray reassuring today.   - XR Hip Left 2-3 Views (Clinic Performed); Future    Chronic left-sided low back pain with left-sided sciatica  Spinal stenosis  of lumbar region without neurogenic claudication  Left leg weakness  Suprisingly significant lumbar disease and spinal stenosis for his age.   With his extensive stenosis and left leg weakness with hip flexion and plantar flexion on exam, recommend neurosurgery consult to discuss management. [facility]. If unable to get in, patient will let me know and can place referral to [facility] or .   MRI just completed three weeks ago. Reviewed today.   No red flag symptoms at this time.   Concerning signs and symptoms reviewed that would indicate need for urgent re-evaluation.   Patient stated understanding and agreement with the plan.   - Neurosurgery Referral      42 minutes spent by me on the date of the encounter doing chart review, history and exam, documentation and further activities per the note        Vibha Fuchs MD  [facility] MARGARITA Jurado is a 33 year old, presenting for the following health issues:  Establish Care, A.D.H.D, and Anxiety        9/27/2023     9:28 AM   Additional Questions   Roomed by Anny Antony   Accompanied by None         9/27/2023     9:28 AM   Patient Reported Additional Medications   Patient reports taking the following new medications None       HPI   Establish care - no specific primary care provider in the past.     ADHD  Onset: since childhood   Description:   Easily distracted: YES  Short attention span: YES  Trouble following directions: YES   Impulsive behavior: YES   Trouble completing tasks: YES  Accompanying Signs & Symptoms:        Change in sleep pattern: Having a hard time sleeping  Irritability at certain times of the day: No  Socially withdrawn: YES  Depression symptoms: YES  Anxiety symptoms: YES-   History:  Caffeine intake: Large  Loss of appetite: No  Healthy diet: No  Did you have problems in school or with previous employment: YES  Family history of ADHD: YES  Have you had an evaluation for ADHD in the  past: YES- about 2 years ago at [facility]  Do you use alcohol or drugs: YES- Medical Cannabis  Therapies tried: Adderall (concerta), Strattera, Vyvanse with intermittent relief     Currently sees Rachael at [facility]   Currently taking Adderall 30mg long acting and two 30mg short acting daily; vyvanse 70mg (but stopped, one month ago), also on strattera 20mg    Mood - abilify 20, lexapro 20, gabapentin 800 tid   Unsure of mood disorder but ?bipolar diagnosis     Last saw Rachael one month ago - saw covering provider though    Anxiety Follow-Up  How are you doing with your anxiety since your last visit? Worsened  Are you having other symptoms that might be associated with anxiety? Yes:  Having a hard time sleeping  Have you had a significant life event? Relationship Concerns, Job Concerns, Financial Concerns, Housing Concerns, and Transportation Concerns   Are you feeling depressed? Yes:     Do you have any concerns with your use of alcohol or other drugs? No      BREATHING   Feels he has breathing issues - shortness of breath at times - comes and airs - worse with cold air   Sometimes feels like lungs are restricted   Doesn't think albuterol has been helpful   Unsure on history of asthma - may have had as a child  Mom had asthma   No cigarettes - vapes occasionally 1x/wk  No wheezing   No chest pain or pleuritic pain       BACK PAIN  Reports he has having horrible issues with his back - Dec 2022 had horrible pain and couldn't stand or urinate     Has flared up again.   Used to be a  - a lot of jeremy hammering   Low back pain and goes down left leg   Left hip feels out of place - hard to tell if it's the pain radiating down on lateral hip   Numbness radiates to just below left knee then stops  Intermittent leg weakness  No loss of bowel/bladder or saddle anesthesia but reports he has had in the past     12/19/2022 - IMPRESSION:   Diffuse degenerative changes of the lumbar spine as detailed  above.  Spinal stenosis is moderate to severe at L4-5. Potential impingement  of multiple nerve roots within the L2-3, L3-4 and L4-5 subarticular  zones as well as the left L5-S1 neural foramen.    9/5/2023 -   FINDINGS:    The lumbar lordosis is straightened. The vertebral body heights are  preserved. No suspicious marrow lesion is identified.     The distal cord and conus medullaris have a normal caliber and  morphology. The conus terminates at L2. No abnormal cord signal is  seen in the distal cord or conus. There are no masses in the spinal  canal or paravertebral soft tissues.     At L1-2, the central spinal canal and neural foramina are patent.     At L2-3, there is a mild symmetric disc bulge and mild facet  degenerative hypertrophy. Spinal stenosis is mild-to-moderate.  Foraminal stenoses are mild.     At L3-4, there is a moderate symmetric disc bulge with a superimposed  shallow caudal midline extrusion and mild facet degenerative  hypertrophy. Spinal stenosis is moderate. Bilateral subarticular zone  narrowing is seen. Foraminal stenoses are mild to moderate.     At L4-5, there is a moderate symmetric disc bulge with an improved  central shallow caudal extrusion. There is moderate facet degenerative  hypertrophy. Spinal stenosis is mild to moderate. Foraminal stenoses  are moderate on the left and moderate to severe on the right.     At L5-S1, there is a moderate asymmetric left disc bulge with moderate  facet degenerative hypertrophy. Spinal and right foraminal narrowing  are mild. There is moderate left foraminal stenosis.                                                                   IMPRESSION:  Multilevel degenerative changes of the lumbar spine as detailed above.  Moderate spinal stenosis at L3-4, associated with bilateral  subarticular zone narrowing.      Social History     Tobacco Use    Smoking status: Never    Smokeless tobacco: Current     Types: Chew    Tobacco comments:     pt denied quit  plan 4/16/19   Vaping Use    Vaping Use: Never used   Substance Use Topics    Alcohol use: Yes    Drug use: Yes     Types: Marijuana         7/31/2017     9:12 AM 9/27/2023     9:21 AM   TAYLOR-7 SCORE   Total Score  13 (moderate anxiety)   Total Score 0 13         11/30/2015     1:32 PM 7/31/2017     9:12 AM 9/27/2023     9:20 AM   PHQ   PHQ-9 Total Score 0 0 10   Q9: Thoughts of better off dead/self-harm past 2 weeks Not at all Not at all Not at all         9/27/2023     9:20 AM   Last PHQ-9   1.  Little interest or pleasure in doing things 1   2.  Feeling down, depressed, or hopeless 1   3.  Trouble falling or staying asleep, or sleeping too much 1   4.  Feeling tired or having little energy 1   5.  Poor appetite or overeating 1   6.  Feeling bad about yourself 2   7.  Trouble concentrating 1   8.  Moving slowly or restless 2   Q9: Thoughts of better off dead/self-harm past 2 weeks 0   PHQ-9 Total Score 10         9/27/2023     9:21 AM   TAYLOR-7    1. Feeling nervous, anxious, or on edge 2   2. Not being able to stop or control worrying 2   3. Worrying too much about different things 2   4. Trouble relaxing 2   5. Being so restless that it is hard to sit still 1   6. Becoming easily annoyed or irritable 2   7. Feeling afraid, as if something awful might happen 2   TAYLOR-7 Total Score 13   If you checked any problems, how difficult have they made it for you to do your work, take care of things at home, or get along with other people? Somewhat difficult             Objective    /75 (BP Location: Right arm, Patient Position: Sitting, Cuff Size: Adult Large)   Pulse 77   Temp 98.4  F (36.9  C) (Tympanic)   Wt 85.4 kg (188 lb 5 oz)   SpO2 98%   BMI 26.26 kg/m    Body mass index is 26.26 kg/m .    Physical Exam  Constitutional:       General: He is not in acute distress.     Appearance: Normal appearance. He is not ill-appearing.   Cardiovascular:      Rate and Rhythm: Normal rate and regular rhythm.      Pulses:  Normal pulses.      Heart sounds: No murmur heard.  Pulmonary:      Effort: Pulmonary effort is normal.      Breath sounds: Normal breath sounds. No wheezing, rhonchi or rales.   Musculoskeletal:      Right lower leg: No edema.      Left lower leg: No edema.      Comments: MSK, back:   ROM: pain with full extension   PALPATION: Nontender over right paraspinal. Tender over left paraspinal and left SI joint.   SENSATION: intact  STRENGTH:  LE Right   5/5 strength with hip flexion (L2-L3)  5/5 strength with hip extension (L4-L5)  5/5 strength with leg extension (L3-L4)  5/5 strength with leg flexion (L5-S1)  5/5 strength with dorsiflexion (L4-L5)  5/5 strength with plantarflexion (S1-S2)  LE Left  4/5 strength with hip flexion  5/5 strength with leg extension  5/5 strength with leg flexion  5/5 strength with dorsiflexion  4/5 strength with plantarflexion  GAIT: Gait normal, no limp. Able to heel walk, some difficulty with left toe walk;   SPECIAL TESTS: positive straight leg raise on left; positive CISCO    MSK: Gait normal. left hip: Non-tender over greater trochanter, ASIS, iliac crest, buttocks, pubic symphysis, adductor tendons. Tender over none. Pain with internal rotation over left hip and pain with external rotation over low back. Log roll without pain.      Skin:     General: Skin is warm and dry.   Neurological:      Mental Status: He is alert.   Psychiatric:         Mood and Affect: Mood normal.         Behavior: Behavior normal.          Results for orders placed or performed in visit on 09/27/23   XR CHEST 2 VW (Clinic Performed)     Status: None    Narrative    PROCEDURE: XR CHEST 2 VIEWS 9/27/2023 10:23 AM    HISTORY: SOB (shortness of breath)    COMPARISONS: 2/9/2020.    TECHNIQUE: 2 views.    FINDINGS: Heart is not enlarged. Lungs are clear and no pleural  effusion is seen.    Metallic radiopaque foreign body again projects over the right  shoulder.         Impression    IMPRESSION: No acute  disease.    GEMA PRADHAN MD         SYSTEM ID:  [email or web address]   Results for orders placed or performed in visit on 09/27/23   XR Hip Left 2-3 Views (Clinic Performed)     Status: None    Narrative    PROCEDURE: XR HIP LEFT 2-3 VIEWS 9/27/2023 10:23 AM    HISTORY: Hip pain, left    COMPARISONS: None.    TECHNIQUE: 2 views.    FINDINGS: No fracture or dislocation is seen. There is no focal bone  lesion.    There is no significant joint space narrowing or spur formation.         Impression    IMPRESSION: No acute fracture.    GEMA PRADHAN MD         SYSTEM ID:  [email or web address]   Results for orders placed or performed in visit on 09/27/23   Comprehensive metabolic panel     Status: Abnormal   Result Value Ref Range    Sodium 141 135 - 145 mmol/L    Potassium 4.4 3.4 - 5.3 mmol/L    Carbon Dioxide (CO2) 27 22 - 29 mmol/L    Anion Gap 10 7 - 15 mmol/L    Urea Nitrogen 10.8 6.0 - 20.0 mg/dL    Creatinine 1.06 0.67 - 1.17 mg/dL    GFR Estimate >90 >60 mL/min/1.73m2    Calcium 10.1 (H) 8.6 - 10.0 mg/dL    Chloride 104 98 - 107 mmol/L    Glucose 95 70 - 99 mg/dL    Alkaline Phosphatase 42 40 - 129 U/L    AST 31 0 - 45 U/L    ALT 36 0 - 70 U/L    Protein Total 7.7 6.4 - 8.3 g/dL    Albumin 4.8 3.5 - 5.2 g/dL    Bilirubin Total 0.4 <=1.2 mg/dL   TSH with free T4 reflex     Status: Normal   Result Value Ref Range    TSH 2.40 0.30 - 4.20 uIU/mL   CBC with platelets and differential     Status: None   Result Value Ref Range    WBC Count 10.0 4.0 - 11.0 10e3/uL    RBC Count 5.03 4.40 - 5.90 10e6/uL    Hemoglobin 15.2 13.3 - 17.7 g/dL    Hematocrit 45.1 40.0 - 53.0 %    MCV 90 78 - 100 fL    MCH 30.2 26.5 - 33.0 pg    MCHC 33.7 31.5 - 36.5 g/dL    RDW 12.4 10.0 - 15.0 %    Platelet Count 314 150 - 450 10e3/uL    % Neutrophils 73 %    % Lymphocytes 19 %    % Monocytes 7 %    % Eosinophils 1 %    % Basophils 0 %    % Immature Granulocytes 0 %    NRBCs per 100 WBC 0 <1 /100    Absolute Neutrophils 7.2 1.6 - 8.3 10e3/uL    Absolute  Lymphocytes 1.9 0.8 - 5.3 10e3/uL    Absolute Monocytes 0.7 0.0 - 1.3 10e3/uL    Absolute Eosinophils 0.1 0.0 - 0.7 10e3/uL    Absolute Basophils 0.0 0.0 - 0.2 10e3/uL    Absolute Immature Granulocytes 0.0 <=0.4 10e3/uL    Absolute NRBCs 0.0 10e3/uL   CBC with Platelets & Differential     Status: None    Narrative    The following orders were created for panel order CBC with Platelets & Differential.  Procedure                               Abnormality         Status                     ---------                               -----------         ------                     CBC with platelets and d...[[id number]]                      Final result                 Please view results for these tests on the individual orders.                 Answers submitted by the patient for this visit:  Patient Health Questionnaire (Submitted on 9/27/2023)  If you checked off any problems, how difficult have these problems made it for you to do your work, take care of things at home, or get along with other people?: Very difficult  PHQ9 TOTAL SCORE: 10  TAYLOR-7 (Submitted on 9/27/2023)  TAYLOR 7 TOTAL SCORE: 13

## 2023-09-27 NOTE — COMMUNITY RESOURCES LIST (ENGLISH)
09/27/2023   [facility]  N/A  For questions about this resource list or additional care needs, please contact your primary care clinic or care manager.  Phone: [phone]   Email: N/A   Address: [address] [place] [address]   Hours: N/A        Food and Nutrition       Food pantry  1  [facility] [place] Distance: 0.23 miles      Pickup   [address] [place] [address]  Language: English  Hours: Mon 9:00 AM - 11:00 AM , Tue 1:00 PM - 3:00 PM , Wed - Thu 9:00 AM - 11:00 AM  Fees: Free, Self Pay   Phone: (796) 196-7314 Email: maricarmen@[facility] [place].[facility] Website: https://[facility].org/[facility]/[place]     2  [facility] [employer] [facility] Free [facility] Distance: 19.86 miles      Pickup   [address] [place] [address]  Language: English  Hours: Mon - Sun Open 24 Hours  Fees: Free   Phone: (979) 850-5882 Email: beatriz@[employer].org Website: http://www.[employer].org     SNAP application assistance  3  [facility]  Economic Services & [facility] Distance: 0.63 miles      In-Person, Phone/Virtual   [address] [place] [address]  Language: English  Hours: Mon - Fri 8:00 AM - 4:30 PM  Fees: Free   Phone: (851) 922-5646 Website: https://www.[facility].gov/ctaazbksvae-x-o/public-health-human-services/economic-services-supports     4  [facility] - Economic Services & [facility] Distance: 19.93 miles      In-Person, Phone/Virtual   [address] [place] [address]  Language: English  Hours: Mon - Fri 8:00 AM - 4:30 PM  Fees: Free   Phone: (593) 925-8779 Email: financialassistance@[facility].gov Website: https://www.[place].gov/wbasjeqjeqk-q-z/public-health-human-services/economic-services-supports     Soup kitchen or free meals  5  [facility] [place]  [facility] and [facility] [place] Distance: 0.23 miles      Pickup   [address] [place] [address]  Language: English  Hours: Mon - Fri 4:00 PM - 4:45 PM  Fees: Free   Phone: (611) 175-9612 Email: maricarmen@[email or web address] Website: https://[facility]/northern/[place]          Housing       Coordinated Entry access point  6  [facility] Administrative Office Distance: 19.86 miles      In-Person   [address] [place] [address]  Language: English  Hours: Mon - Fri 8:00 AM - 4:30 PM  Fees: Free   Phone: (876) 191-3442 Email: beatriz@[employer] Website: http://www.[employer].org     Housing search assistance  7  [facility] Distance: 2.27 miles      In-Person   [address] [place], [place] [address]  Language: English  Hours: Mon - Sun Open 24 Hours  Fees: Free   Phone: (262) 127-1472 Website: http://www.[employer]     8  [facility] Administrative Office Distance: 19.86 miles      Phone/Virtual   [address] [place] [address]  Language: English  Hours: Mon - Fri 8:00 AM - 4:30 PM  Fees: Free   Phone: (557) 539-5412 Email: beatriz@[employer] Website: http://www.[employer].org     Shelter for families  9  [facility] Distance: 2.27 miles      In-Person   [address] [place] [address]  Language: English  Hours: Mon - Sun Open 24 Hours  Fees: Free   Phone: (468) 883-5830 Website: http://www.[employer]     10  [facility] Distance: 20.06 miles      In-Person   [address] [place] [address]  Language: English  Hours: Mon - Sun Open 24 Hours  Fees: Free   Phone: (297) 259-2348 Website: http://www.[employer]     Shelter for individuals  11  [facility] Distance: 2.27 miles      In-Person   [address] [place] [address]  Language: English   Hours: Mon - Sun Open 24 Hours  Fees: Free   Phone: (886) 814-4430 Website: http://www.[employer]     12  [employer] - [facility] Distance: 20.06 miles      In-Person   [address] [place] [address]  Language: English  Hours: Mon - Sun Open 24 Hours  Fees: Free   Phone: (795) 421-8259 Website: http://www.[employer]          Important Numbers & Websites       Emergency Services   911  [facility] Services   311  Poison Control   (102) 462-4957  Suicide Prevention Lifeline   (521) 450-3650 (TALK)  Child Abuse Hotline   (267) 887-9475 (4-A-Child)  Sexual Assault Hotline   (486) 515-2936 (HOPE)  National Runaway Safeline   (651) 410-9429 (RUNAWAY)  All-Options Talkline   (269) 906-9674  Substance Abuse Referral   (381) 461-5588 (HELP)

## 2023-10-02 ENCOUNTER — TELEPHONE (OUTPATIENT)
Dept: FAMILY MEDICINE | Facility: OTHER | Age: 33
End: 2023-10-02

## 2023-10-02 DIAGNOSIS — M48.061 SPINAL STENOSIS OF LUMBAR REGION WITHOUT NEUROGENIC CLAUDICATION: Primary | ICD-10-CM

## 2023-10-02 RX ORDER — CELECOXIB 100 MG/1
100 CAPSULE ORAL 2 TIMES DAILY
Qty: 60 CAPSULE | Refills: 1 | Status: SHIPPED | OUTPATIENT
Start: 2023-10-02 | End: 2023-11-03

## 2023-10-02 NOTE — TELEPHONE ENCOUNTER
9:31 AM    Reason for Call: Phone Call    Description: Can't get into therapy for back. Would like a med prescribed for pain.     Was an appointment offered for this call? No    Preferred method for responding to this message: Telephone Call  What is your phone number [phone]    If we cannot reach you directly, may we leave a detailed response at the number you provided? Yes    Can this message wait until your PCP/provider returns, if available today? Not applicable    Deborah Anguiano

## 2023-10-02 NOTE — TELEPHONE ENCOUNTER
Please have the [facility] then - he may be able to get in sooner there.   I sent in Celebrex ( a type of ibuprofen). Can take 100mg twice daily as needed. Should take with food. Do not take any other ibuprofen with it.

## 2023-10-02 NOTE — TELEPHONE ENCOUNTER
I called and spoke with patient. Patient stated he is agreeable to do [facility]. Patient stated he will go  his medication.     Will you please put in the referral?

## 2023-10-03 NOTE — TELEPHONE ENCOUNTER
I attempted to call patient. I left a detailed voicemail stating that the referral was put it and that [facility] will call to schedule.

## 2023-10-06 ENCOUNTER — APPOINTMENT (OUTPATIENT)
Dept: OCCUPATIONAL MEDICINE | Facility: OTHER | Age: 33
End: 2023-10-06

## 2023-10-06 PROCEDURE — 99000 SPECIMEN HANDLING OFFICE-LAB: CPT

## 2023-11-03 ENCOUNTER — HOSPITAL ENCOUNTER (OUTPATIENT)
Dept: GENERAL RADIOLOGY | Facility: HOSPITAL | Age: 33
Discharge: HOME | End: 2023-11-03
Attending: STUDENT IN AN ORGANIZED HEALTH CARE EDUCATION/TRAINING PROGRAM
Payer: COMMERCIAL

## 2023-11-03 ENCOUNTER — OFFICE VISIT (OUTPATIENT)
Dept: FAMILY MEDICINE | Facility: OTHER | Age: 33
End: 2023-11-03
Attending: STUDENT IN AN ORGANIZED HEALTH CARE EDUCATION/TRAINING PROGRAM
Payer: COMMERCIAL

## 2023-11-03 VITALS
DIASTOLIC BLOOD PRESSURE: 70 MMHG | HEART RATE: 61 BPM | TEMPERATURE: 97 F | SYSTOLIC BLOOD PRESSURE: 124 MMHG | BODY MASS INDEX: 27.23 KG/M2 | OXYGEN SATURATION: 100 % | WEIGHT: 195.25 LBS

## 2023-11-03 DIAGNOSIS — M48.061 SPINAL STENOSIS OF LUMBAR REGION WITHOUT NEUROGENIC CLAUDICATION: Chronic | ICD-10-CM

## 2023-11-03 DIAGNOSIS — Z11.3 SCREEN FOR STD (SEXUALLY TRANSMITTED DISEASE): ICD-10-CM

## 2023-11-03 DIAGNOSIS — F90.9 ATTENTION DEFICIT HYPERACTIVITY DISORDER (ADHD), UNSPECIFIED ADHD TYPE: Chronic | ICD-10-CM

## 2023-11-03 DIAGNOSIS — G89.29 CHRONIC LEFT-SIDED LOW BACK PAIN WITH LEFT-SIDED SCIATICA: Chronic | ICD-10-CM

## 2023-11-03 DIAGNOSIS — F39 MOOD DISORDER (H): Primary | Chronic | ICD-10-CM

## 2023-11-03 DIAGNOSIS — M54.42 CHRONIC LEFT-SIDED LOW BACK PAIN WITH LEFT-SIDED SCIATICA: Chronic | ICD-10-CM

## 2023-11-03 DIAGNOSIS — F41.9 ANXIETY: Chronic | ICD-10-CM

## 2023-11-03 DIAGNOSIS — R06.02 SOB (SHORTNESS OF BREATH): ICD-10-CM

## 2023-11-03 DIAGNOSIS — E83.52 HYPERCALCEMIA: ICD-10-CM

## 2023-11-03 DIAGNOSIS — J45.30 MILD PERSISTENT ASTHMA WITHOUT COMPLICATION: ICD-10-CM

## 2023-11-03 LAB
C TRACH DNA SPEC QL PROBE+SIG AMP: NEGATIVE
CALCIUM SERPL-MCNC: 9.7 MG/DL (ref 8.6–10)
N GONORRHOEA DNA SPEC QL NAA+PROBE: NEGATIVE

## 2023-11-03 PROCEDURE — 36415 COLL VENOUS BLD VENIPUNCTURE: CPT | Mod: ZL | Performed by: STUDENT IN AN ORGANIZED HEALTH CARE EDUCATION/TRAINING PROGRAM

## 2023-11-03 PROCEDURE — G0463 HOSPITAL OUTPT CLINIC VISIT: HCPCS

## 2023-11-03 PROCEDURE — 87491 CHLMYD TRACH DNA AMP PROBE: CPT | Mod: ZL | Performed by: STUDENT IN AN ORGANIZED HEALTH CARE EDUCATION/TRAINING PROGRAM

## 2023-11-03 PROCEDURE — 87389 HIV-1 AG W/HIV-1&-2 AB AG IA: CPT | Mod: ZL | Performed by: STUDENT IN AN ORGANIZED HEALTH CARE EDUCATION/TRAINING PROGRAM

## 2023-11-03 PROCEDURE — G0463 HOSPITAL OUTPT CLINIC VISIT: HCPCS | Mod: 27

## 2023-11-03 PROCEDURE — 99214 OFFICE O/P EST MOD 30 MIN: CPT | Performed by: STUDENT IN AN ORGANIZED HEALTH CARE EDUCATION/TRAINING PROGRAM

## 2023-11-03 PROCEDURE — 82310 ASSAY OF CALCIUM: CPT | Mod: ZL | Performed by: STUDENT IN AN ORGANIZED HEALTH CARE EDUCATION/TRAINING PROGRAM

## 2023-11-03 PROCEDURE — 86780 TREPONEMA PALLIDUM: CPT | Mod: ZL | Performed by: STUDENT IN AN ORGANIZED HEALTH CARE EDUCATION/TRAINING PROGRAM

## 2023-11-03 RX ORDER — CELECOXIB 100 MG/1
100 CAPSULE ORAL 2 TIMES DAILY
Qty: 60 CAPSULE | Refills: 3 | Status: SHIPPED | OUTPATIENT
Start: 2023-11-03 | End: 2024-02-05

## 2023-11-03 RX ORDER — ALBUTEROL SULFATE 90 UG/1
2 AEROSOL, METERED RESPIRATORY (INHALATION) EVERY 6 HOURS PRN
Qty: 18 G | Refills: 1 | Status: SHIPPED | OUTPATIENT
Start: 2023-11-03

## 2023-11-03 RX ORDER — GABAPENTIN 800 MG/1
800 TABLET ORAL 3 TIMES DAILY
Status: CANCELLED | OUTPATIENT
Start: 2023-11-03

## 2023-11-03 ASSESSMENT — PAIN SCALES - GENERAL: PAINLEVEL: NO PAIN (0)

## 2023-11-03 NOTE — RESULT ENCOUNTER NOTE
Please call and notify patient that his STD testing is negative for gonorrhea and chlamydia. Calcium is also now normal.

## 2023-11-03 NOTE — PATIENT INSTRUCTIONS
A referral was placed for you to see [facility]. please call [phone] to schedule and discuss with them.     Labs today - will call this afternoon with STD testing.     A referral was placed for you to see psychiatry here. If you do not hear to schedule in 4 business days, please reach out to our clinic at [phone] so we can look into it further.      A referral was placed for you to do physical therapy.   If this referral was to [facility], if you have not heard anything after 2 business days, please call [phone] to schedule an appointment.     Radiology will call for pain consult then to schedule steroid shot for back.

## 2023-11-03 NOTE — PROGRESS NOTES
Assessment & Plan     Mood disorder (H24)  Attention deficit hyperactivity disorder (ADHD), unspecified ADHD type  Anxiety  Again unclear diagnosis -?bipolar   Currently on abilify, lexapro, paxil, gabapentin prescribed from provider at [facility]  - would like to see provider here (feels too many medications)  Referral to Dr Henning or new NP here at [facility]   - Adult Mental Health  Referral; Future    SOB (shortness of breath)  Mild persistent asthma without complication  Shortness of breath much improved with symbicort - suspect persistent asthma was issue   CXR and labs previously reassuring  PFTs scheduled for end of the month  Will plan to continue symbicort and give albuterol to have on hand   Follow up 4-5 months, sooner if flare/new symptoms arise   - albuterol (PROAIR HFA/PROVENTIL HFA/VENTOLIN HFA) 108 (90 Base) MCG/ACT inhaler; Inhale 2 puffs into the lungs every 6 hours as needed for shortness of breath, wheezing or cough    Spinal stenosis of lumbar region without neurogenic claudication  Chronic left-sided low back pain with left-sided sciatica  Has extensive stenosis and leg (left) weakness with hip flexion and plantar flexion  Planning IR consult for steroid injection (DAVID), physical therapy, and consult for possible surgical management with [facility] Dr Whelan - phone number given   He is aware of red flag symptoms   Will continue celebrex as it has been helpful - Creatinine within normal limits   - celecoxib (CELEBREX) 100 MG capsule; Take 1 capsule (100 mg) by mouth 2 times daily  - IR Consultation for IR Exam (Pain Consult); Future  - Physical Therapy Referral; Future    Hypercalcemia  Recheck today. Suspect related to high volume of milk intake. Encouraged cutting back.   - Calcium; Future  - Calcium    Screen for STD (sexually transmitted disease)  Screening today - ?chlamydia exposure but he is not sure.   Will update GC, HIV, syphilis testing   -  Chlamydia trachomatis/Neisseria gonorrhoeae by PCR; Future  - HIV Antigen Antibody Combo Cascade; Future  - Treponema Abs w Reflex to RPR and Titer; Future  - Chlamydia trachomatis/Neisseria gonorrhoeae by PCR  - HIV Antigen Antibody Combo Cascade  - Treponema Abs w Reflex to RPR and Titer        Vibha Fuchs MD  [facility] - MARGARITA Jurado is a 33 year old, presenting for the following health issues:  Exposure to STD, A.D.H.D, Anxiety, and Shortness of Breath        11/3/2023    11:15 AM   Additional Questions   Roomed by Anny Antony   Accompanied by None         11/3/2023    11:15 AM   Patient Reported Additional Medications   Patient reports taking the following new medications None       HPI     Patient would like to have STD screening done today, patient is not experiencing any symptoms.  Patient had a new partner and just wants to get checked. Maybe they have chlamydia but he isn't sure.     Shortness of breath/history of asthma  Evaluated 9/27/2023  Started symbicort  PFTs ordered - scheduled 11/28/23  CXR reassuring. CBC, CMP, TSH within normal limits.   Does think shortness of breath is much better    Mood disorder/ADHD  Following at [facility]   Wanting to see alternate provider  Would like referral here     Left low back pain, spinal stenosis, left leg weakness  Did have left leg weakness on exam last visit  Pain low back and goes down left leg, numbness to knee  [facility]   MRI completed 8/2023  Celebrex ordered for pain - does help   Needs number for OA    Calcium elevated last visit. Does not take a supplement. Drinks a lot of milk. 1/2 gallon per day.           Objective    /70   Pulse 61   Temp 97  F (36.1  C) (Tympanic)   Wt 88.6 kg (195 lb 4 oz)   SpO2 100%   BMI 27.23 kg/m    Body mass index is 27.23 kg/m .    Physical Exam  Constitutional:       General: He is not in acute distress.     Appearance: Normal  appearance. He is not ill-appearing.   Pulmonary:      Effort: Pulmonary effort is normal.      Breath sounds: Normal breath sounds.   Neurological:      Mental Status: He is alert.   Psychiatric:         Mood and Affect: Mood normal.         Behavior: Behavior normal.            No results found for any visits on 11/03/23.

## 2023-11-04 LAB — T PALLIDUM AB SER QL: NONREACTIVE

## 2023-11-06 ENCOUNTER — TELEPHONE (OUTPATIENT)
Dept: FAMILY MEDICINE | Facility: OTHER | Age: 33
End: 2023-11-06

## 2023-11-06 LAB — HIV 1+2 AB+HIV1 P24 AG SERPL QL IA: NONREACTIVE

## 2023-11-06 NOTE — TELEPHONE ENCOUNTER
Patient called, lab results given      Vibha Fuchs MD  11/3/2023  2:32 PM CDT       Please call and notify patient that his STD testing is negative for gonorrhea and chlamydia. Calcium is also now normal.

## 2023-11-06 NOTE — TELEPHONE ENCOUNTER
Jw Gonzalez LPN  11/3/2023  2:59 PM CDT Back to Top      Called and updated patient with results. Patient had no further questions.

## 2023-11-06 NOTE — TELEPHONE ENCOUNTER
Patient is calling wanting his test results. Please call patient with test results at [phone].

## 2023-11-10 ENCOUNTER — HOSPITAL ENCOUNTER (OUTPATIENT)
Dept: GENERAL RADIOLOGY | Facility: HOSPITAL | Age: 33
Discharge: HOME | End: 2023-11-10
Attending: STUDENT IN AN ORGANIZED HEALTH CARE EDUCATION/TRAINING PROGRAM
Payer: COMMERCIAL

## 2023-11-10 ENCOUNTER — HOSPITAL ENCOUNTER (OUTPATIENT)
Facility: HOSPITAL | Age: 33
Discharge: HOME | End: 2023-11-10
Attending: RADIOLOGY | Admitting: RADIOLOGY
Payer: COMMERCIAL

## 2023-11-10 DIAGNOSIS — M47.26 OTHER SPONDYLOSIS WITH RADICULOPATHY, LUMBAR REGION: ICD-10-CM

## 2023-11-10 PROCEDURE — 250N000011 HC RX IP 250 OP 636: Mod: JZ | Performed by: RADIOLOGY

## 2023-11-10 PROCEDURE — 64483 NJX AA&/STRD TFRM EPI L/S 1: CPT

## 2023-11-10 RX ORDER — DEXAMETHASONE SODIUM PHOSPHATE 10 MG/ML
10 INJECTION, SOLUTION INTRAMUSCULAR; INTRAVENOUS ONCE
Status: COMPLETED | OUTPATIENT
Start: 2023-11-10 | End: 2023-11-10

## 2023-11-10 RX ORDER — LIDOCAINE HYDROCHLORIDE 10 MG/ML
5 INJECTION, SOLUTION EPIDURAL; INFILTRATION; INTRACAUDAL; PERINEURAL ONCE
Status: DISCONTINUED | OUTPATIENT
Start: 2023-11-10 | End: 2023-11-10

## 2023-11-10 RX ORDER — IOPAMIDOL 612 MG/ML
15 INJECTION, SOLUTION INTRATHECAL ONCE
Status: COMPLETED | OUTPATIENT
Start: 2023-11-10 | End: 2023-11-10

## 2023-11-10 RX ADMIN — IOPAMIDOL 5 ML: 612 INJECTION, SOLUTION INTRATHECAL at 10:47

## 2023-11-10 RX ADMIN — DEXAMETHASONE SODIUM PHOSPHATE 10 MG: 10 INJECTION, SOLUTION INTRAMUSCULAR; INTRAVENOUS at 10:47

## 2023-11-10 NOTE — DISCHARGE INSTRUCTIONS
Cell number on file:    Telephone Information:   Mobile [phone]     Is it ok to leave a message at this number(s)? Yes    Dr Singh completed your procedure on 11/10/2023.    Current Pain Level (0-10 Scale): 5/10  Post Pain Level (0-10):  0/10    Radiology Discharge instructions for Steroid Injection    Activity Level:     Do not do any heavy activity or exercise for 24 hours.   Do not drive for 4 hours after your injection.  Diet:   Return to your normal diet.  Medications:   If you have stopped taking your Aspirin, Coumadin/Warfarin, Ibuprofen, or any   other blood thinner for this procedure you may resume in the morning unless   your primary care provider has given you other instructions.    Diabetics may see an increase in blood sugar after steroid injections. If you are concerned about your blood sugar, please contact your family doctor.    Site Care:  Remove the bandage and bathe or shower the morning after the procedure.      This is a Pain Management procedure.  You will be contacted in two weeks for follow up.    Call your Primary Care Provider if you have the following (if your primary care provider is not available please seek emergency care):   Nausea with vomiting   Severe headache   Drowsiness or confusion   Redness or drainage at the injection or puncture site   Temperature over 101 degrees F   Other concerns   Worsening back pain   Stiff neck

## 2023-11-22 ENCOUNTER — TELEPHONE (OUTPATIENT)
Dept: INTERVENTIONAL RADIOLOGY/VASCULAR | Facility: HOSPITAL | Age: 33
End: 2023-11-22

## 2023-11-22 ENCOUNTER — HOSPITAL ENCOUNTER (OUTPATIENT)
Facility: HOSPITAL | Age: 33
End: 2023-11-22
Attending: RADIOLOGY | Admitting: RADIOLOGY
Payer: COMMERCIAL

## 2023-11-22 DIAGNOSIS — M48.061 SPINAL STENOSIS OF LUMBAR REGION WITHOUT NEUROGENIC CLAUDICATION: ICD-10-CM

## 2023-11-22 DIAGNOSIS — M47.26 OTHER SPONDYLOSIS WITH RADICULOPATHY, LUMBAR REGION: Primary | ICD-10-CM

## 2023-11-22 DIAGNOSIS — M54.42 CHRONIC LEFT-SIDED LOW BACK PAIN WITH LEFT-SIDED SCIATICA: ICD-10-CM

## 2023-11-22 DIAGNOSIS — G89.29 CHRONIC LEFT-SIDED LOW BACK PAIN WITH LEFT-SIDED SCIATICA: ICD-10-CM

## 2023-11-22 NOTE — TELEPHONE ENCOUNTER
CONSULT PATIENT  PAIN INJECTION POST CALL    Procedure: Epidural TF LT L4-5  Radiologist(s): Dr. Donnell Singh  Date of Procedure: 11/10/23    The patient had no questions or concerns.    Relief of pain from this injection    A = 90%  A- = 85%  B = 80%  B- =75%  C = 70%  C- = 65%  D = 60%  D- = 50%  F = less than 50%       Did you get any relief from this injection in the past 2 weeks? Yes for about 1 week patient got 50% of relief.     If yes, how long did the relief last? 1 week    Are you still feeling relief? (2 weeks out)  No, patient is now at 0% of relief.  Would you say this injection has been beneficial? Yes and no      Was there one injection that worked better than the other? This was the patient's first injection in his lumbar.    Where is the pain? Pain is still in the same spot on the lower back below the waistline left side only.   Can you describe the pain? Shooting, uncomfortable  Does the pain radiate anywhere? Yes  If yes, where does it radiate and where does the pain stop? It goes down his left buttocks into the outside of the left leg to the knee.     Is this new pain? No    Patient would like to pursue another injection.      Alix Rojas

## 2023-12-04 ENCOUNTER — TELEPHONE (OUTPATIENT)
Dept: INTERVENTIONAL RADIOLOGY/VASCULAR | Facility: HOSPITAL | Age: 33
End: 2023-12-04

## 2023-12-04 NOTE — TELEPHONE ENCOUNTER
Left a message to remind patient of their ubaldo on 12/6. Also reminded patient to not take any antibiotics, steroids, or immunizations two weeks before and after this appt. And they need a .     RASHAD CARY

## 2023-12-19 ENCOUNTER — TELEPHONE (OUTPATIENT)
Dept: FAMILY MEDICINE | Facility: OTHER | Age: 33
End: 2023-12-19

## 2023-12-19 NOTE — TELEPHONE ENCOUNTER
9:20 AM    Reason for Call: OVERBOOK    Patient is having the following symptoms: pt is wanting to come in for an STD test. Is okay with seeing anyone since pcp is out     The patient is requesting an appointment for today or soon with any covering provider.    Was an appointment offered for this call? No  If yes : Appointment type              Date    Preferred method for responding to this message: Telephone Call  What is your phone number ?[phone]    If we cannot reach you directly, may we leave a detailed response at the number you provided? Yes    Can this message wait until your PCP/provider returns, if unavailable today? Not applicable    Jacque Lai

## 2023-12-21 ENCOUNTER — APPOINTMENT (OUTPATIENT)
Dept: OCCUPATIONAL MEDICINE | Facility: OTHER | Age: 33
End: 2023-12-21

## 2023-12-21 PROCEDURE — 92499 UNLISTED OPH SVC/PROCEDURE: CPT

## 2023-12-21 PROCEDURE — 92552 PURE TONE AUDIOMETRY AIR: CPT

## 2023-12-21 PROCEDURE — 99499 UNLISTED E&M SERVICE: CPT

## 2023-12-21 PROCEDURE — 99000 SPECIMEN HANDLING OFFICE-LAB: CPT

## 2023-12-21 PROCEDURE — 99080 SPECIAL REPORTS OR FORMS: CPT

## 2024-01-01 DIAGNOSIS — R06.02 SOB (SHORTNESS OF BREATH): ICD-10-CM

## 2024-01-01 DIAGNOSIS — Z87.09 HISTORY OF ASTHMA: ICD-10-CM

## 2024-01-02 RX ORDER — BUDESONIDE AND FORMOTEROL FUMARATE DIHYDRATE 80; 4.5 UG/1; UG/1
AEROSOL RESPIRATORY (INHALATION)
Qty: 10.2 G | Refills: 0 | Status: SHIPPED | OUTPATIENT
Start: 2024-01-02 | End: 2024-02-06

## 2024-01-02 NOTE — TELEPHONE ENCOUNTER
Symbicort  Last Written Prescription Date: 9/27/23  Last Fill Quantity: 10.2 g # of Refills: 0  Last Office Visit: 11/3/23

## 2024-01-31 ENCOUNTER — HOSPITAL ENCOUNTER (EMERGENCY)
Facility: HOSPITAL | Age: 34
Discharge: HOME | End: 2024-01-31
Attending: PHYSICIAN ASSISTANT | Admitting: PHYSICIAN ASSISTANT
Payer: COMMERCIAL

## 2024-01-31 VITALS
RESPIRATION RATE: 18 BRPM | SYSTOLIC BLOOD PRESSURE: 161 MMHG | DIASTOLIC BLOOD PRESSURE: 91 MMHG | HEART RATE: 76 BPM | TEMPERATURE: 99.5 F | OXYGEN SATURATION: 97 %

## 2024-01-31 DIAGNOSIS — K08.89 PAIN, DENTAL: ICD-10-CM

## 2024-01-31 DIAGNOSIS — K02.9 DENTAL CARIES: ICD-10-CM

## 2024-01-31 PROCEDURE — 99283 EMERGENCY DEPT VISIT LOW MDM: CPT | Performed by: PHYSICIAN ASSISTANT

## 2024-01-31 PROCEDURE — 99283 EMERGENCY DEPT VISIT LOW MDM: CPT

## 2024-01-31 RX ORDER — AMOXICILLIN 875 MG
875 TABLET ORAL 2 TIMES DAILY
Qty: 20 TABLET | Refills: 0 | Status: SHIPPED | OUTPATIENT
Start: 2024-01-31

## 2024-01-31 RX ORDER — IBUPROFEN 600 MG/1
600 TABLET, FILM COATED ORAL EVERY 6 HOURS PRN
Qty: 30 TABLET | Refills: 0 | Status: SHIPPED | OUTPATIENT
Start: 2024-01-31

## 2024-01-31 ASSESSMENT — ACTIVITIES OF DAILY LIVING (ADL): ADLS_ACUITY_SCORE: 33

## 2024-01-31 NOTE — LETTER
January 31, 2024      To Whom It May Concern:      Eddi BAY Neil was seen in our Emergency Department today, 01/31/24.  Please excuse him from work on 1/31.      Sincerely,            Vicki Rivera PA-C

## 2024-02-01 NOTE — ED PROVIDER NOTES
History     Chief Complaint   Patient presents with    Dental Pain     The history is provided by the patient.     Eddi Trejo is a 34 year old male who presented to the emergency department ambulatory for evaluation of dental pain.  Long history of dental caries and pain.  No difficulty swallowing.  Requesting a work note.    Allergies:  Allergies   Allergen Reactions    Penicillins      Patient denies difficulty breathing with reaction. He reports a mild reaction and has previously tolerated keflex without difficulty.        Problem List:    Patient Active Problem List    Diagnosis Date Noted    Mild persistent asthma without complication 11/03/2023     Priority: Medium    Attention deficit hyperactivity disorder (ADHD), unspecified ADHD type 09/27/2023     Priority: Medium     Diagnosed 2 years ago but also diagnosed as a child - was on ritalin  Currently sees [facility]       Anxiety 09/27/2023     Priority: Medium    Mood disorder (H24) 09/27/2023     Priority: Medium     Currently sees [facility]       History of alcohol dependence (H) 09/27/2023     Priority: Medium    Medical marijuana use 09/27/2023     Priority: Medium    History of asthma 09/27/2023     Priority: Medium    SOB (shortness of breath) 09/27/2023     Priority: Medium    Chronic left-sided low back pain with left-sided sciatica 09/27/2023     Priority: Medium    Spinal stenosis of lumbar region without neurogenic claudication 09/27/2023     Priority: Medium    Left leg weakness 09/27/2023     Priority: Medium        Past Medical History:    Past Medical History:   Diagnosis Date    Bronchitis 4/25/02    Headache 4/28/10    Open wound of forehead 4/28/10    Otitis 4/18/02    Schizophrenia (H) 4/28/10    URI (upper respiratory infection) 12/24/95       Past Surgical History:    Past Surgical History:   Procedure Laterality Date    ARTHROSCOPY KNEE Left 11/10/2017    Procedure: ARTHROSCOPY  KNEE;  Left Knee Arthroscopy with Menical Repair;  Surgeon: Jp Pritchard DO;  Location: [facility] OR    ARTHROSCOPY KNEE Left 2019    Procedure: LEFT KNEE ARTHROSCOPY;  Surgeon: Noel Moralez MD;  Location: [facility] OR    IR CONSULTATION FOR IR EXAM  11/3/2023    ZZHC CREATE EARDRUM OPENING,GEN ANESTH      PE tubes as child       Family History:    Family History   Problem Relation Age of Onset    Asthma Mother     Colon Cancer Maternal Grandfather          at 58    Breast Cancer Maternal Grandmother     Diabetes Paternal Grandmother     Diabetes Paternal Aunt     Diabetes Maternal Aunt     C.A.D. No family hx of     Hypertension No family hx of        Social History:  Marital Status:  Single [1]  Social History     Tobacco Use    Smoking status: Never    Smokeless tobacco: Current     Types: Chew    Tobacco comments:     pt denied quit plan 19   Vaping Use    Vaping Use: Never used   Substance Use Topics    Alcohol use: Yes    Drug use: Yes     Types: Marijuana        Medications:    amoxicillin (AMOXIL) 875 MG tablet  ibuprofen (ADVIL/MOTRIN) 600 MG tablet  albuterol (PROAIR HFA/PROVENTIL HFA/VENTOLIN HFA) 108 (90 Base) MCG/ACT inhaler  amphetamine-dextroamphetamine (ADDERALL) 30 MG tablet  amphetamine-dextroamphetamine (ADDERALL) 30 MG tablet  ARIPiprazole (ABILIFY) 20 MG tablet  atomoxetine (STRATTERA) 10 MG capsule  budesonide-formoterol (SYMBICORT) 80-4.5 MCG/ACT Inhaler  celecoxib (CELEBREX) 100 MG capsule  escitalopram (LEXAPRO) 20 MG tablet  gabapentin (NEURONTIN) 800 MG tablet  hydrOXYzine (ATARAX) 25 MG tablet          Review of Systems   HENT:          See HPI       Physical Exam   BP: 161/91  Pulse: 76  Temp: 99.5  F (37.5  C)  Resp: 18  SpO2: 97 %      Physical Exam  Vitals and nursing note reviewed.   Constitutional:       Appearance: Normal appearance. He is normal weight.   HENT:      Mouth/Throat:      Comments: Diffuse caries without evidence of abscess.  No trismus.  No difficulty  swallowing.  Normal speech.  No submental, sublingual, or submandibular swelling.  Neurological:      General: No focal deficit present.      Mental Status: He is alert and oriented to person, place, and time.         ED Course                 Procedures              Critical Care time:  none               No results found for this or any previous visit (from the past 24 hour(s)).    Medications - No data to display    Assessments & Plan (with Medical Decision Making)   Patient has tolerated amoxicillin recently.  This will be prescribed along with ibuprofen.  Close dental follow-up was discussed.  Strict return precautions were provided.  No evidence of emergent condition at this time.    This document was prepared using a combination of typing and voice generated software.  While every attempt was made for accuracy, spelling and grammatical errors may exist.     I have reviewed the nursing notes.    I have reviewed the findings, diagnosis, plan and need for follow up with the patient.           Medical Decision Making  The patient's presentation was of low complexity (2+ clearly self-limited or minor problems).    The patient's evaluation involved:  history and exam without other MDM data elements    The patient's management necessitated moderate risk (prescription drug management including medications given in the ED).        New Prescriptions    AMOXICILLIN (AMOXIL) 875 MG TABLET    Take 1 tablet (875 mg) by mouth 2 times daily    IBUPROFEN (ADVIL/MOTRIN) 600 MG TABLET    Take 1 tablet (600 mg) by mouth every 6 hours as needed for moderate pain       Final diagnoses:   Pain, dental   Dental caries       1/31/2024   [facility]       Vicki Rivera PA-C  01/31/24 [id number]

## 2024-02-01 NOTE — ED TRIAGE NOTES
"Patient presents to the emergency room with complaints of dental pain. Symptoms started last night. Upper front tooth pain and swelling. Fever today at home 101.0. \"I am not able to eat or drink anything.\" Rates dental pain 8/10.         "

## 2024-02-01 NOTE — DISCHARGE INSTRUCTIONS
Amoxicillin as prescribed.  It shows an allergy to penicillin but you have tolerated amoxicillin recently.    Follow-up with the dentist.  Return here as needed.

## 2024-02-01 NOTE — ED NOTES
Pt discharged with insty med prescription for Amoxicillin and Ibuprofen. Pt verbalized understanding of discharge instructions.

## 2024-02-04 DIAGNOSIS — M48.061 SPINAL STENOSIS OF LUMBAR REGION WITHOUT NEUROGENIC CLAUDICATION: Chronic | ICD-10-CM

## 2024-02-05 RX ORDER — CELECOXIB 100 MG/1
100 CAPSULE ORAL 2 TIMES DAILY
Qty: 60 CAPSULE | Refills: 0 | Status: SHIPPED | OUTPATIENT
Start: 2024-02-05 | End: 2024-03-05

## 2024-02-05 NOTE — TELEPHONE ENCOUNTER
Celebrex      Last Written Prescription Date:  1.2.24  Last Fill Quantity: #60,   # refills: 0  Last Office Visit: 11.3.23  Future Office visit:    Next 5 appointments (look out 90 days)      Apr 11, 2024  9:00 AM  (Arrive by 8:45 AM)  PHYSICAL with Vibha Fuchs MD  [facility] ([facility] ) [address]  [place] [address]  [phone]             Routing refill request to provider for review/approval because:  Drug not on the [facility], [facility] or [facility] refill protocol or controlled substance

## 2024-02-06 DIAGNOSIS — R06.02 SOB (SHORTNESS OF BREATH): ICD-10-CM

## 2024-02-06 DIAGNOSIS — Z87.09 HISTORY OF ASTHMA: ICD-10-CM

## 2024-02-06 RX ORDER — BUDESONIDE AND FORMOTEROL FUMARATE DIHYDRATE 80; 4.5 UG/1; UG/1
AEROSOL RESPIRATORY (INHALATION)
Qty: 10.2 G | Refills: 0 | Status: SHIPPED | OUTPATIENT
Start: 2024-02-06

## 2024-02-06 NOTE — TELEPHONE ENCOUNTER
Budesonide/Formoterol (Symbicort) 80-4.5 MCG/ACT inhaler    Last Written Prescription Date:  01/02/2024  Last Fill Quantity: 10.2,   # refills: 0  Last Office Visit: 11/03/2023  Future Office visit:    Next 5 appointments (look out 90 days)      Apr 11, 2024  9:00 AM  (Arrive by 8:45 AM)  PHYSICAL with Vibha Fuchs MD  [facility] [place] ([facility] [place] ) [address]  [place] [address]  [phone]             Routing refill request to provider for review/approval because:  Protocol failed on the Symbicort.

## 2024-03-02 DIAGNOSIS — M48.061 SPINAL STENOSIS OF LUMBAR REGION WITHOUT NEUROGENIC CLAUDICATION: Chronic | ICD-10-CM

## 2024-03-04 NOTE — TELEPHONE ENCOUNTER
Celebrex      Last Written Prescription Date:  2/5/24  Last Fill Quantity: 60,   # refills: 0  Last Office Visit: 11/3/23  Future Office visit:    Next 5 appointments (look out 90 days)      Apr 11, 2024  9:00 AM  (Arrive by 8:45 AM)  PHYSICAL with Vibha Fuchs MD  [facility] [place] ([facility] [place] ) [address]  [place] [address]  [phone]             Routing refill request to provider for review/approval because:

## 2024-03-05 RX ORDER — CELECOXIB 100 MG/1
100 CAPSULE ORAL 2 TIMES DAILY
Qty: 60 CAPSULE | Refills: 2 | Status: SHIPPED | OUTPATIENT
Start: 2024-03-05

## 2024-04-11 PROBLEM — J45.30 MILD PERSISTENT ASTHMA WITHOUT COMPLICATION: Chronic | Status: ACTIVE | Noted: 2023-11-03

## 2024-08-31 ENCOUNTER — APPOINTMENT (OUTPATIENT)
Dept: ULTRASOUND IMAGING | Facility: HOSPITAL | Age: 34
End: 2024-08-31
Attending: EMERGENCY MEDICINE
Payer: MEDICAID

## 2024-08-31 ENCOUNTER — APPOINTMENT (OUTPATIENT)
Dept: CT IMAGING | Facility: HOSPITAL | Age: 34
End: 2024-08-31
Attending: NURSE PRACTITIONER
Payer: MEDICAID

## 2024-08-31 ENCOUNTER — HOSPITAL ENCOUNTER (EMERGENCY)
Facility: HOSPITAL | Age: 34
Discharge: HOME | End: 2024-08-31
Attending: EMERGENCY MEDICINE | Admitting: EMERGENCY MEDICINE
Payer: MEDICAID

## 2024-08-31 VITALS
TEMPERATURE: 98.8 F | HEART RATE: 102 BPM | OXYGEN SATURATION: 99 % | RESPIRATION RATE: 18 BRPM | SYSTOLIC BLOOD PRESSURE: 153 MMHG | DIASTOLIC BLOOD PRESSURE: 91 MMHG

## 2024-08-31 DIAGNOSIS — N45.3 ORCHITIS AND EPIDIDYMITIS: ICD-10-CM

## 2024-08-31 LAB
ALBUMIN UR-MCNC: 10 MG/DL
ANION GAP SERPL CALCULATED.3IONS-SCNC: 14 MMOL/L (ref 7–15)
APPEARANCE UR: CLEAR
BILIRUB UR QL STRIP: NEGATIVE
BUN SERPL-MCNC: 11.1 MG/DL (ref 6–20)
CALCIUM SERPL-MCNC: 9.6 MG/DL (ref 8.8–10.4)
CHLORIDE SERPL-SCNC: 100 MMOL/L (ref 98–107)
COLOR UR AUTO: ABNORMAL
CREAT SERPL-MCNC: 0.98 MG/DL (ref 0.67–1.17)
EGFRCR SERPLBLD CKD-EPI 2021: >90 ML/MIN/1.73M2
ERYTHROCYTE [DISTWIDTH] IN BLOOD BY AUTOMATED COUNT: 12 % (ref 10–15)
GLUCOSE SERPL-MCNC: 143 MG/DL (ref 70–99)
GLUCOSE UR STRIP-MCNC: NEGATIVE MG/DL
HCO3 SERPL-SCNC: 22 MMOL/L (ref 22–29)
HCT VFR BLD AUTO: 40.1 % (ref 40–53)
HGB BLD-MCNC: 14.4 G/DL (ref 13.3–17.7)
HGB UR QL STRIP: NEGATIVE
HOLD SPECIMEN: NORMAL
KETONES UR STRIP-MCNC: NEGATIVE MG/DL
LEUKOCYTE ESTERASE UR QL STRIP: NEGATIVE
MCH RBC QN AUTO: 31 PG (ref 26.5–33)
MCHC RBC AUTO-ENTMCNC: 35.9 G/DL (ref 31.5–36.5)
MCV RBC AUTO: 86 FL (ref 78–100)
MUCOUS THREADS #/AREA URNS LPF: PRESENT /LPF
NITRATE UR QL: NEGATIVE
PH UR STRIP: 6 [PH] (ref 4.7–8)
PLATELET # BLD AUTO: 345 10E3/UL (ref 150–450)
POTASSIUM SERPL-SCNC: 3.7 MMOL/L (ref 3.4–5.3)
RBC # BLD AUTO: 4.65 10E6/UL (ref 4.4–5.9)
RBC URINE: 2 /HPF
SODIUM SERPL-SCNC: 136 MMOL/L (ref 135–145)
SP GR UR STRIP: 1.02 (ref 1–1.03)
SQUAMOUS EPITHELIAL: 0 /HPF
UROBILINOGEN UR STRIP-MCNC: NORMAL MG/DL
WBC # BLD AUTO: 15.6 10E3/UL (ref 4–11)
WBC URINE: 2 /HPF

## 2024-08-31 PROCEDURE — 36415 COLL VENOUS BLD VENIPUNCTURE: CPT | Performed by: EMERGENCY MEDICINE

## 2024-08-31 PROCEDURE — 250N000011 HC RX IP 250 OP 636: Performed by: NURSE PRACTITIONER

## 2024-08-31 PROCEDURE — 76870 US EXAM SCROTUM: CPT

## 2024-08-31 PROCEDURE — 96375 TX/PRO/DX INJ NEW DRUG ADDON: CPT

## 2024-08-31 PROCEDURE — 74177 CT ABD & PELVIS W/CONTRAST: CPT

## 2024-08-31 PROCEDURE — 96372 THER/PROPH/DIAG INJ SC/IM: CPT | Mod: XU | Performed by: NURSE PRACTITIONER

## 2024-08-31 PROCEDURE — 250N000013 HC RX MED GY IP 250 OP 250 PS 637: Performed by: NURSE PRACTITIONER

## 2024-08-31 PROCEDURE — 96374 THER/PROPH/DIAG INJ IV PUSH: CPT | Mod: XU

## 2024-08-31 PROCEDURE — 85027 COMPLETE CBC AUTOMATED: CPT | Performed by: NURSE PRACTITIONER

## 2024-08-31 PROCEDURE — 99284 EMERGENCY DEPT VISIT MOD MDM: CPT | Performed by: NURSE PRACTITIONER

## 2024-08-31 PROCEDURE — 96376 TX/PRO/DX INJ SAME DRUG ADON: CPT

## 2024-08-31 PROCEDURE — 99285 EMERGENCY DEPT VISIT HI MDM: CPT | Mod: 25

## 2024-08-31 PROCEDURE — 93976 VASCULAR STUDY: CPT

## 2024-08-31 PROCEDURE — 250N000011 HC RX IP 250 OP 636: Performed by: EMERGENCY MEDICINE

## 2024-08-31 PROCEDURE — 81001 URINALYSIS AUTO W/SCOPE: CPT | Performed by: EMERGENCY MEDICINE

## 2024-08-31 PROCEDURE — 80048 BASIC METABOLIC PNL TOTAL CA: CPT | Performed by: NURSE PRACTITIONER

## 2024-08-31 RX ORDER — CEFTRIAXONE SODIUM 1 G
500 VIAL (EA) INJECTION ONCE
Status: DISCONTINUED | OUTPATIENT
Start: 2024-08-31 | End: 2024-08-31

## 2024-08-31 RX ORDER — KETOROLAC TROMETHAMINE 10 MG/1
10 TABLET, FILM COATED ORAL EVERY 6 HOURS PRN
Qty: 20 TABLET | Refills: 0 | Status: SHIPPED | OUTPATIENT
Start: 2024-08-31

## 2024-08-31 RX ORDER — IOPAMIDOL 755 MG/ML
95 INJECTION, SOLUTION INTRAVASCULAR ONCE
Status: COMPLETED | OUTPATIENT
Start: 2024-08-31 | End: 2024-08-31

## 2024-08-31 RX ORDER — MORPHINE SULFATE 4 MG/ML
4 INJECTION, SOLUTION INTRAMUSCULAR; INTRAVENOUS ONCE
Status: COMPLETED | OUTPATIENT
Start: 2024-08-31 | End: 2024-08-31

## 2024-08-31 RX ORDER — DOXYCYCLINE 100 MG/1
100 CAPSULE ORAL EVERY 12 HOURS SCHEDULED
Status: DISCONTINUED | OUTPATIENT
Start: 2024-08-31 | End: 2024-08-31

## 2024-08-31 RX ORDER — DOXYCYCLINE 100 MG/1
100 CAPSULE ORAL ONCE
Status: COMPLETED | OUTPATIENT
Start: 2024-08-31 | End: 2024-08-31

## 2024-08-31 RX ORDER — CEFTRIAXONE SODIUM 1 G
1 VIAL (EA) INJECTION ONCE
Status: COMPLETED | OUTPATIENT
Start: 2024-08-31 | End: 2024-08-31

## 2024-08-31 RX ORDER — ONDANSETRON 2 MG/ML
4 INJECTION INTRAMUSCULAR; INTRAVENOUS ONCE
Status: COMPLETED | OUTPATIENT
Start: 2024-08-31 | End: 2024-08-31

## 2024-08-31 RX ORDER — DOXYCYCLINE 100 MG/1
100 CAPSULE ORAL 2 TIMES DAILY
Qty: 20 CAPSULE | Refills: 0 | Status: SHIPPED | OUTPATIENT
Start: 2024-08-31 | End: 2024-09-10

## 2024-08-31 RX ORDER — KETOROLAC TROMETHAMINE 30 MG/ML
30 INJECTION, SOLUTION INTRAMUSCULAR; INTRAVENOUS ONCE
Status: COMPLETED | OUTPATIENT
Start: 2024-08-31 | End: 2024-08-31

## 2024-08-31 RX ADMIN — DOXYCYCLINE HYCLATE 100 MG: 100 CAPSULE ORAL at 22:45

## 2024-08-31 RX ADMIN — IOPAMIDOL 95 ML: 755 INJECTION, SOLUTION INTRAVENOUS at 22:03

## 2024-08-31 RX ADMIN — ONDANSETRON 4 MG: 2 INJECTION INTRAMUSCULAR; INTRAVENOUS at 20:17

## 2024-08-31 RX ADMIN — CEFTRIAXONE 1 G: 1 INJECTION, POWDER, FOR SOLUTION INTRAMUSCULAR; INTRAVENOUS at 22:44

## 2024-08-31 RX ADMIN — KETOROLAC TROMETHAMINE 30 MG: 30 INJECTION, SOLUTION INTRAMUSCULAR at 21:38

## 2024-08-31 RX ADMIN — MORPHINE SULFATE 4 MG: 4 INJECTION, SOLUTION INTRAMUSCULAR; INTRAVENOUS at 20:18

## 2024-08-31 RX ADMIN — MORPHINE SULFATE 4 MG: 4 INJECTION, SOLUTION INTRAMUSCULAR; INTRAVENOUS at 21:03

## 2024-08-31 ASSESSMENT — ACTIVITIES OF DAILY LIVING (ADL)
ADLS_ACUITY_SCORE: 35
ADLS_ACUITY_SCORE: 35
ADLS_ACUITY_SCORE: 33
ADLS_ACUITY_SCORE: 35

## 2024-08-31 ASSESSMENT — ENCOUNTER SYMPTOMS
HEMATURIA: 0
FREQUENCY: 0
ABDOMINAL PAIN: 0
NAUSEA: 1
PSYCHIATRIC NEGATIVE: 1
HEMATOLOGIC/LYMPHATIC NEGATIVE: 1
CONSTITUTIONAL NEGATIVE: 1
DIFFICULTY URINATING: 0
RESPIRATORY NEGATIVE: 1
FLANK PAIN: 0
CONSTIPATION: 0
BLOOD IN STOOL: 0
DYSURIA: 0
EYES NEGATIVE: 1
NEUROLOGICAL NEGATIVE: 1
VOMITING: 1
MUSCULOSKELETAL NEGATIVE: 1
DIARRHEA: 0
CARDIOVASCULAR NEGATIVE: 1
ALLERGIC/IMMUNOLOGIC NEGATIVE: 1
ENDOCRINE NEGATIVE: 1

## 2024-08-31 ASSESSMENT — COLUMBIA-SUICIDE SEVERITY RATING SCALE - C-SSRS
2. HAVE YOU ACTUALLY HAD ANY THOUGHTS OF KILLING YOURSELF IN THE PAST MONTH?: NO
6. HAVE YOU EVER DONE ANYTHING, STARTED TO DO ANYTHING, OR PREPARED TO DO ANYTHING TO END YOUR LIFE?: NO
1. IN THE PAST MONTH, HAVE YOU WISHED YOU WERE DEAD OR WISHED YOU COULD GO TO SLEEP AND NOT WAKE UP?: NO

## 2024-09-01 NOTE — ED PROVIDER NOTES
History     Chief Complaint   Patient presents with    Testicular/scrotal Pain     HPI  Eddi Trejo is a 34 year old male who presents with right testicular pain. Patient states pain started 2 hours ago, worsening now to the right testicle. Patient denies trauma. Patient states was in bed when this happened. Patient states has been having nausea, no vomiting. Denies fevers, chills, nausea/vomiting, abdominal pain, flank pain, dysuria, hematuria.     Allergies:  Allergies   Allergen Reactions    Penicillins      Patient denies difficulty breathing with reaction. He reports a mild reaction and has previously tolerated keflex without difficulty.        Problem List:    Patient Active Problem List    Diagnosis Date Noted    Mild persistent asthma without complication 11/03/2023     Priority: Medium    Attention deficit hyperactivity disorder (ADHD), unspecified ADHD type 09/27/2023     Priority: Medium     Diagnosed 2 years ago but also diagnosed as a child - was on ritalin  Currently sees [facility]       Anxiety 09/27/2023     Priority: Medium    Mood disorder (H24) 09/27/2023     Priority: Medium     Currently sees [facility]       History of alcohol dependence (H) 09/27/2023     Priority: Medium    Medical marijuana use 09/27/2023     Priority: Medium    SOB (shortness of breath) 09/27/2023     Priority: Medium    Chronic left-sided low back pain with left-sided sciatica 09/27/2023     Priority: Medium    Spinal stenosis of lumbar region without neurogenic claudication 09/27/2023     Priority: Medium    Left leg weakness 09/27/2023     Priority: Medium        Past Medical History:    Past Medical History:   Diagnosis Date    Bronchitis 4/25/02    Headache 4/28/10    Open wound of forehead 4/28/10    Otitis 4/18/02    Schizophrenia (H) 4/28/10    URI (upper respiratory infection) 12/24/95       Past Surgical History:    Past Surgical History:   Procedure  Laterality Date    ARTHROSCOPY KNEE Left 11/10/2017    Procedure: ARTHROSCOPY KNEE;  Left Knee Arthroscopy with Menical Repair;  Surgeon: Jp Pritchard DO;  Location: [facility] OR    ARTHROSCOPY KNEE Left 2019    Procedure: LEFT KNEE ARTHROSCOPY;  Surgeon: Noel Moralez MD;  Location: [facility] OR    IR CONSULTATION FOR IR EXAM  11/3/2023    ZZHC CREATE EARDRUM OPENING,GEN ANESTH      PE tubes as child       Family History:    Family History   Problem Relation Age of Onset    Asthma Mother     Colon Cancer Maternal Grandfather          at 58    Breast Cancer Maternal Grandmother     Diabetes Paternal Grandmother     Diabetes Paternal Aunt     Diabetes Maternal Aunt     C.A.D. No family hx of     Hypertension No family hx of        Social History:  Marital Status:  Single [1]  Social History     Tobacco Use    Smoking status: Never    Smokeless tobacco: Current     Types: Chew    Tobacco comments:     pt denied quit plan 19   Vaping Use    Vaping status: Never Used   Substance Use Topics    Alcohol use: Yes    Drug use: Yes     Types: Marijuana        Medications:    doxycycline hyclate (VIBRAMYCIN) 100 MG capsule  ketorolac (TORADOL) 10 MG tablet  albuterol (PROAIR HFA/PROVENTIL HFA/VENTOLIN HFA) 108 (90 Base) MCG/ACT inhaler  amoxicillin (AMOXIL) 875 MG tablet  amphetamine-dextroamphetamine (ADDERALL) 30 MG tablet  amphetamine-dextroamphetamine (ADDERALL) 30 MG tablet  ARIPiprazole (ABILIFY) 20 MG tablet  atomoxetine (STRATTERA) 10 MG capsule  budesonide-formoterol (SYMBICORT) 80-4.5 MCG/ACT Inhaler  celecoxib (CELEBREX) 100 MG capsule  escitalopram (LEXAPRO) 20 MG tablet  gabapentin (NEURONTIN) 800 MG tablet  hydrOXYzine (ATARAX) 25 MG tablet  ibuprofen (ADVIL/MOTRIN) 600 MG tablet          Review of Systems   Constitutional: Negative.    HENT: Negative.     Eyes: Negative.    Respiratory: Negative.     Cardiovascular: Negative.    Gastrointestinal:  Positive for nausea and vomiting. Negative for abdominal  pain, blood in stool, constipation and diarrhea.   Endocrine: Negative.    Genitourinary:  Positive for testicular pain. Negative for difficulty urinating, dysuria, flank pain, frequency, hematuria, penile discharge, penile pain, penile swelling and scrotal swelling.   Musculoskeletal: Negative.    Skin: Negative.    Allergic/Immunologic: Negative.    Neurological: Negative.    Hematological: Negative.    Psychiatric/Behavioral: Negative.         All systems reviewed and negative with exception of that stated in the HPI      Physical Exam   BP: 153/91  Pulse: 102  Temp: 98.8  F (37.1  C)  Resp: 18  SpO2: 96 %      Physical Exam    INITIAL VITAL SIGNS: Reviewed by me  GENERAL: Alert and interactive. Moderate distress.   HEAD: Head is normocephalic and atraumatic  EYES: No conjunctival injection  ENT: Moist mucous membranes.  RESPIRATORY: No tachypnea. Clear breath sounds bilaterally. No wheezing, rales, or rhonchi  CV: Regular rate and rhythm. No murmurs, rubs, or gallops  ABDOMEN: Soft, non-distended, non-tender. No guarding. No rebound. No masses.  : jesús Todd RN  Right testicle mild swelling, high-riding testicle  EXTREMITIES: No deformity.   SKIN: Warm and dry. No obvious rashes.  NEUROLOGIC: Alert and oriented. Face is symmetric. Speech is normal. Moves all extremities equally.       ED Course     ED Course as of 08/31/24 2248   Sat Aug 31, 2024   2100 Patient apparently requesting male provider.  UA has already been done and morphine already given.  Ultrasound of testicles already conducted.   2125 In to see patient and history/physical completed.    2133 CT abdomen pelvis with IV contrast ordered as patient is having pain out of proportion.  Labs ordered.  Ketorolac 30 mg IV ordered.   2212 Ceftriaxone 500 mg IM ordered.   2226 CT negative for acute abnormality.  For this reason we will discharge home to continue doxycycline 100 mg twice daily x 10 days for epididymitis/orchitis.  Ketorolac ordered for  pain control.  Did educate no other NSAIDs while on this.  Scrotal support recommended.  Close follow-up with PCP recommended.  Return precautions given.                 Results for orders placed or performed during the hospital encounter of 08/31/24 (from the past 24 hour(s))   [place] Draw    Narrative    The following orders were created for panel order [place] Draw.  Procedure                               Abnormality         Status                     ---------                               -----------         ------                     Extra Blue Top Tube[[id number]]                              Final result               Extra Green Top (Lithium...[[id number]]                      Final result               Extra Green Top (Lithium...[[id number]]                      Final result               Extra Purple Top Tube[[id number]]                            Final result                 Please view results for these tests on the individual orders.   Extra Blue Top Tube   Result Value Ref Range    Hold Specimen JIC    Extra Green Top (Lithium Heparin) Tube   Result Value Ref Range    Hold Specimen JIC    Extra Green Top (Lithium Heparin) Tube   Result Value Ref Range    Hold Specimen JIC    Extra Purple Top Tube   Result Value Ref Range    Hold Specimen JIC    CBC with platelets   Result Value Ref Range    WBC Count 15.6 (H) 4.0 - 11.0 10e3/uL    RBC Count 4.65 4.40 - 5.90 10e6/uL    Hemoglobin 14.4 13.3 - 17.7 g/dL    Hematocrit 40.1 40.0 - 53.0 %    MCV 86 78 - 100 fL    MCH 31.0 26.5 - 33.0 pg    MCHC 35.9 31.5 - 36.5 g/dL    RDW 12.0 10.0 - 15.0 %    Platelet Count 345 150 - 450 10e3/uL   Basic metabolic panel   Result Value Ref Range    Sodium 136 135 - 145 mmol/L    Potassium 3.7 3.4 - 5.3 mmol/L    Chloride 100 98 - 107 mmol/L    Carbon Dioxide (CO2) 22 22 - 29 mmol/L    Anion Gap 14 7 - 15 mmol/L    Urea Nitrogen 11.1 6.0 - 20.0 mg/dL    Creatinine 0.98 0.67 - 1.17 mg/dL    GFR Estimate >90 >60 mL/min/1.73m2     Calcium 9.6 8.8 - 10.4 mg/dL    Glucose 143 (H) 70 - 99 mg/dL   UA with Microscopic reflex to Culture    Specimen: Urine, Midstream   Result Value Ref Range    Color Urine Light Yellow Colorless, Straw, Light Yellow, Yellow    Appearance Urine Clear Clear    Glucose Urine Negative Negative mg/dL    Bilirubin Urine Negative Negative    Ketones Urine Negative Negative mg/dL    Specific Gravity Urine 1.023 1.003 - 1.035    Blood Urine Negative Negative    pH Urine 6.0 4.7 - 8.0    Protein Albumin Urine 10 (A) Negative mg/dL    Urobilinogen Urine Normal Normal, 2.0 mg/dL    Nitrite Urine Negative Negative    Leukocyte Esterase Urine Negative Negative    Mucus Urine Present (A) None Seen /LPF    RBC Urine 2 <=2 /HPF    WBC Urine 2 <=5 /HPF    Squamous Epithelials Urine 0 <=1 /HPF    Narrative    Urine Culture not indicated   US Testicular & Scrotum w Doppler Ltd    Narrative    PROCEDURE: US TESTICULAR AND SCROTUM WITH DOPPLER LIMITED    HISTORY:34 years Male  with scrotal pain..    TECHNIQUE: Grayscale and color ultrasound of the scrotum and contents  were performed.    COMPARISON: 10/17/2021    FINDINGS:     MEASUREMENTS:   Right testis: 4.0 x 2.3 x 3.1 centimeters (Length x AP x Width)  Echogenicity of the parenchyma is uniform. There is no evidence of  mass. Blood flow is detected within the parenchyma  Left testis: 4.1 x 2.3 x 2.8 centimeters (Length x AP x Width)  Echogenicity of the parenchyma is uniform. There is no evidence of  mass.  Blood flow is detected within the parenchyma.    Blood flow in the testicles is prominent. Blood flow in the right  epididymis is prominent.      Impression    IMPRESSION: No evidence of testicular mass or torsion. Prominent blood  flow is observed within the testicles and right epididymis, question  epididymoorchitis.    ABDIEL NICHOLS MD         SYSTEM ID:  [id number]     CT abdomen pelvis with IV contrast:  No acute abnormality.  Degenerative changes of the lumbar spine  noted.    Medications   morphine (PF) injection 4 mg (4 mg Intravenous $Given 8/31/24 2018)   ondansetron (ZOFRAN) injection 4 mg (4 mg Intravenous $Given 8/31/24 2017)   morphine (PF) injection 4 mg (4 mg Intravenous $Given 8/31/24 2103)   ketorolac (TORADOL) injection 30 mg (30 mg Intravenous $Given 8/31/24 2138)   iopamidol (ISOVUE-370) solution 95 mL (95 mLs Intravenous $Given 8/31/24 2203)   sodium chloride (PF) 0.9% PF flush 60 mL (60 mLs Intravenous $Given 8/31/24 2203)   cefTRIAXone (ROCEPHIN) in lidocaine 1% (PF) for IM administration 1 g (1 g Intramuscular $Given 8/31/24 2244)   doxycycline hyclate (VIBRAMYCIN) capsule 100 mg (100 mg Oral $Given 8/31/24 2245)       Assessments & Plan (with Medical Decision Making)     I have reviewed the nursing notes.    I have reviewed the findings, diagnosis, plan and need for follow up with the patient.    Eddi Trejo is a 34 year old male who presents with right testicular pain; approx 6pm onset of pain, now worsened with associated nausea. VS unremarkable. On exam, patient has right riding tender testicle.  Concern for testicular torsion.     8:10 PM I saw patient and US paged.     8:34 PM Ultrasound currently with patient.  I called [facility] to discuss high suspicion for testicular torsion with urology.   Placed on a hold.     8:38 PM [facility] obtained information.    Dr. Sanchez paged while US resulted, normal flow; findings concerning for early/mild right epididymo-orchitis.     9:00 PM Patient requested a male provider.  I, Dr. Sheron Powers, will sign out this case to Noel Huber APRN      2100:  Patient presents to the ER today for testicular pain on right.  Potential diagnosis which have been considered and evaluated include torsion, epididymitis, orchitis, hernia, ureteral stone, as well as others. Many of these have been excluded using the various modalities and assessment as noted on the chart. At the present time, the diagnosis given seems  to be the most likely epididymitis/orchitis of the right.  Upon arrival, vitals signs show blood pressure 153/91 with a pulse of 102.  Demonstrates 98.8  F.  Respirations 18 with oxygenation of 96% on room air.  The patient is alert and oriented.  Respiratory and cardiac examination normal.  No abdominal abnormalities noted on examination.  Does have right sided pelvic tenderness with mild swelling in this area.  Severe tenderness to right testicle with any movement.  No erythema or mass present.  No scrotal abnormalities noted.  No penile discharge present.  Patient had received morphine, ondansetron prior to my arrival.  UA was conducted showing no obvious abnormality.  Testicular ultrasound was conducted showing right-sided epididymitis/orchitis but no torsion.  Patient continues to have significant pain out of proportion so further workup was conducted.  Labs obtained showing WBC of 15.6 with hemoglobin of 14.4.  Electrolytes and renal functions benign.  CT abdomen pelvis with IV contrast conducted showing no acute abnormality.    Patient was given ketorolac 30 mg IV for further pain control.  Ceftriaxone 1 g IM given for epididymitis/orchitis.  Doxycycline 100 mg p.o. given for the same.  Will discharge patient home on doxycycline 100 mg twice daily x 10 days.  Will also place on ketorolac 10 mg every 6 hours as needed for pain control in addition to acetaminophen.  Did educate no other NSAIDs while on the ketorolac.  Did educate patient about scrotal support during this time for pain control.  Close follow-up with PCP recommended.  Return to ER if new or worsening symptoms.  Patient verbalized understand agrees to plan of care.  Patient discharged home with significant other.      Critical Care Time: None    Impression and plan discussed with patient. Questions answered, concerns addressed, indications for urgent re-evaluation reviewed, and  given. Patient/Parent/Caregiver agree with treatment plan and  have no further questions at this time.  AVS provided at discharge.    This note was created by the Dragon Voice Dictation System. Inadvertent typographical errors, due to software recognition problems, may still exist.           New Prescriptions    DOXYCYCLINE HYCLATE (VIBRAMYCIN) 100 MG CAPSULE    Take 1 capsule (100 mg) by mouth 2 times daily for 10 days.    KETOROLAC (TORADOL) 10 MG TABLET    Take 1 tablet (10 mg) by mouth every 6 hours as needed for moderate pain.       Final diagnoses:   Orchitis and epididymitis       8/31/2024   [facility]       Noel Huber, APRN CNP  08/31/24 7136

## 2024-09-01 NOTE — ED NOTES
Patient requesting male provider. States that he has no new concerns, just wants a male involved with his care. Care team informed.

## 2024-09-01 NOTE — DISCHARGE INSTRUCTIONS
Antibiotic use:   An antibiotic was ordered to help fight the bacterial infection that was acquired.  You need to take this medication exactly as prescribed.  DO NOT stop taking this medication until the prescribed end date, even if you are feeling better.  This way the affecting bacteria in your body are killed off.   You should eat probiotic yogurt (with live cultures) or Kefir (similar to yogurt) while taking antibiotic to promote regrowth of good bacteria in your digestive tract, which can be depleted by antibiotics.  Birth control and antibiotics (if applicable):   Birth control pills contain estrogens. Some antibiotics cause the enzymes in the liver to increase the break-down of estrogens and thereby can decrease the levels of estrogens in the body and the effectiveness of the birth control medications.  This can result in unwanted pregnancy.  To be safe it is wise to use a back-up-method of birth control while you take, and for 7 days after finishing the antibiotic.  Coumadin and antibiotics (if applicable):   Finally, if on coumadin, let your coumadin prescriber know. You likely need to have your INR checked by your Primary Care Provider in 2-3 days. Contact your clinic for an appointment.        Ketorolac (Toradol):   While taking the ketorolac (Toradol), do not use aspirin, ibuprofen, Aleve, or naproxen products for pain control.  However, if able, you can use acetaminophen for further pain control in addition to the ketorolac.  Once done with the ketorolac, you may take aspirin, ibuprofen, Aleve, or naproxen products for pain control again.  Remember to keep well hydrated and take this medication with food.        Pain control:   If your past medical conditions, allergies, current medications, or current status does not prevent you from using acetaminophen and/or ibuprofen, use the following:   Acetaminophen 650-1000 mg every 6 hours as needed for pain in addition to ibuprofen 400-600 mg every 6 hours as  needed for pain.  Take these two medications together if wanted.    Remember that these are for AS NEEDED.  If not needed, do not take.

## 2024-09-01 NOTE — ED TRIAGE NOTES
States that 2 hours ago developed sudden onset right testicular pain. Denies injury or activity at time of onset.      Triage Assessment (Adult)       Row Name 08/31/24 1949          Triage Assessment    Airway WDL WDL

## 2024-11-02 ENCOUNTER — HEALTH MAINTENANCE LETTER (OUTPATIENT)
Age: 34
End: 2024-11-02

## 2025-02-24 ENCOUNTER — TELEPHONE (OUTPATIENT)
Dept: FAMILY MEDICINE | Facility: OTHER | Age: 35
End: 2025-02-24

## 2025-02-24 NOTE — TELEPHONE ENCOUNTER
8:48 AM    Reason for Call: OVERBOOK    Patient is having the following symptoms: back pain for 2 months. Getting worse. Cannot sit or lay down for peroids of time    The patient is requesting an appointment for soon with Dr. Fuchs.    Was an appointment offered for this call? YES   If yes : Appointment type April 8 but pt wants sooner              Date    Preferred method for responding to this message: Telephone Call  What is your phone number ? [phone]    If we cannot reach you directly, may we leave a detailed response at the number you provided? Yes    Can this message wait until your PCP/provider returns, if unavailable today? lAe Valencia

## 2025-02-24 NOTE — TELEPHONE ENCOUNTER
Attempt # 1  Outcome: Left Message   Comment: LVM for patient to call to move up appt to 3/3 with Dr. Fuchs.

## 2025-03-03 ENCOUNTER — OFFICE VISIT (OUTPATIENT)
Dept: FAMILY MEDICINE | Facility: OTHER | Age: 35
End: 2025-03-03
Attending: STUDENT IN AN ORGANIZED HEALTH CARE EDUCATION/TRAINING PROGRAM
Payer: COMMERCIAL

## 2025-03-03 VITALS
RESPIRATION RATE: 18 BRPM | HEART RATE: 72 BPM | TEMPERATURE: 99.1 F | OXYGEN SATURATION: 95 % | WEIGHT: 241.9 LBS | SYSTOLIC BLOOD PRESSURE: 112 MMHG | DIASTOLIC BLOOD PRESSURE: 66 MMHG | HEIGHT: 71 IN | BODY MASS INDEX: 33.86 KG/M2

## 2025-03-03 DIAGNOSIS — M54.42 CHRONIC LEFT-SIDED LOW BACK PAIN WITH LEFT-SIDED SCIATICA: Primary | ICD-10-CM

## 2025-03-03 DIAGNOSIS — G89.29 CHRONIC LEFT-SIDED LOW BACK PAIN WITH LEFT-SIDED SCIATICA: Primary | ICD-10-CM

## 2025-03-03 DIAGNOSIS — M48.061 SPINAL STENOSIS OF LUMBAR REGION WITHOUT NEUROGENIC CLAUDICATION: Chronic | ICD-10-CM

## 2025-03-03 PROCEDURE — G2211 COMPLEX E/M VISIT ADD ON: HCPCS | Performed by: STUDENT IN AN ORGANIZED HEALTH CARE EDUCATION/TRAINING PROGRAM

## 2025-03-03 PROCEDURE — 1125F AMNT PAIN NOTED PAIN PRSNT: CPT | Performed by: STUDENT IN AN ORGANIZED HEALTH CARE EDUCATION/TRAINING PROGRAM

## 2025-03-03 PROCEDURE — 3074F SYST BP LT 130 MM HG: CPT | Performed by: STUDENT IN AN ORGANIZED HEALTH CARE EDUCATION/TRAINING PROGRAM

## 2025-03-03 PROCEDURE — 3078F DIAST BP <80 MM HG: CPT | Performed by: STUDENT IN AN ORGANIZED HEALTH CARE EDUCATION/TRAINING PROGRAM

## 2025-03-03 PROCEDURE — G0463 HOSPITAL OUTPT CLINIC VISIT: HCPCS

## 2025-03-03 PROCEDURE — 99214 OFFICE O/P EST MOD 30 MIN: CPT | Performed by: STUDENT IN AN ORGANIZED HEALTH CARE EDUCATION/TRAINING PROGRAM

## 2025-03-03 RX ORDER — CELECOXIB 100 MG/1
100 CAPSULE ORAL 2 TIMES DAILY
Qty: 60 CAPSULE | Refills: 2 | Status: SHIPPED | OUTPATIENT
Start: 2025-03-03

## 2025-03-03 RX ORDER — CYCLOBENZAPRINE HCL 5 MG
5 TABLET ORAL 2 TIMES DAILY PRN
Qty: 30 TABLET | Refills: 0 | Status: SHIPPED | OUTPATIENT
Start: 2025-03-03

## 2025-03-03 ASSESSMENT — ASTHMA QUESTIONNAIRES
QUESTION_3 LAST FOUR WEEKS HOW OFTEN DID YOUR ASTHMA SYMPTOMS (WHEEZING, COUGHING, SHORTNESS OF BREATH, CHEST TIGHTNESS OR PAIN) WAKE YOU UP AT NIGHT OR EARLIER THAN USUAL IN THE MORNING: ONCE OR TWICE
QUESTION_4 LAST FOUR WEEKS HOW OFTEN HAVE YOU USED YOUR RESCUE INHALER OR NEBULIZER MEDICATION (SUCH AS ALBUTEROL): ONCE A WEEK OR LESS
ACT_TOTALSCORE: 21
QUESTION_1 LAST FOUR WEEKS HOW MUCH OF THE TIME DID YOUR ASTHMA KEEP YOU FROM GETTING AS MUCH DONE AT WORK, SCHOOL OR AT HOME: NONE OF THE TIME
QUESTION_5 LAST FOUR WEEKS HOW WOULD YOU RATE YOUR ASTHMA CONTROL: WELL CONTROLLED
ACT_TOTALSCORE: 21
QUESTION_2 LAST FOUR WEEKS HOW OFTEN HAVE YOU HAD SHORTNESS OF BREATH: ONCE OR TWICE A WEEK

## 2025-03-03 ASSESSMENT — ENCOUNTER SYMPTOMS: BACK PAIN: 1

## 2025-03-03 ASSESSMENT — PAIN SCALES - GENERAL: PAINLEVEL_OUTOF10: MODERATE PAIN (6)

## 2025-03-03 NOTE — PROGRESS NOTES
Assessment & Plan     Spinal stenosis of lumbar region without neurogenic claudication  Chronic left-sided low back pain with left-sided sciatica  Continued chronic low back pain with known spinal stenosis at a young age.  Did respond well to transforaminal injection in 2023, however hoping to avoid ongoing injections and interested in possible surgery.  Pain currently is in the left low back with extension down his left leg.  Slight weakness on the left compared to the right.  No red flags.  Will update MRI.  Referral placed to physical therapy.  Refilled Celebrex for him to use up to 100 mg twice daily and also Flexeril at bedtime for muscle spasm.  Reviewed risks and benefits of these medications.  Instructed not to drive after taking Flexeril.  Pending MRI, would like referral to neurosurgery, Dr. Whelan.  Will place referral once MRI has resulted.  Concerning signs and symptoms reviewed that would indicate need for urgent re-evaluation. Patient stated understanding and agreement with the plan.  - MR Lumbar Spine w/o Contrast; Future  - Physical Therapy  Referral; Future  - cyclobenzaprine (FLEXERIL) 5 MG tablet; Take 1 tablet (5 mg) by mouth 2 times daily as needed for muscle spasms.        The longitudinal plan of care for the diagnosis(es)/condition(s) as documented were addressed during this visit. Due to the added complexity in care, I will continue to support Adrien in the subsequent management and with ongoing continuity of care.    Subjective   Adrien is a 35 year old, presenting for the following health issues:  Back Pain      3/3/2025     2:14 PM   Additional Questions   Roomed by Kristine BOWSER   Accompanied by self         3/3/2025     2:14 PM   Patient Reported Additional Medications   Patient reports taking the following new medications none     History of Present Illness       Reason for visit:  My lower back maybe hip   He is taking medications regularly.        Pain History:  When did you  "first notice your pain? Long time ago    Have you seen this provider for your pain in the past? Yes   Where in your body do you have pain? Low back   Are you seeing anyone else for your pain? No    Long history of back pain.   Really bad again six months ago. Hard to move. Hard to get out of bed. Has to creep into standing up straight.   Pain is in left low back and goes down left leg.   Sitting is most comfortable position     Injection 11/10/23 - L4/L5 transforaminal injection was very helpful   History of physical therapy years ago   Most interested in surgery     Any cancer or history of cancer? no   Any trouble peeing or pooping?  no Any saddle numbness or does it feel abnormal when you wipe? no  Any history of IVDU? no  Recent back injection or UTI with a alexis? no  Any recent trauma or accident or back surgery? no  Any abdominal pain? no   Any chance of pregnancy? no   IUD use? no  What makes the pain worse? lifting on the left     What time of day is the pain worse? Certain positions/activities  Are you on blood thinners? no  Osteoporosis or history of fractures? no          Objective    /66 (BP Location: Left arm, Patient Position: Sitting, Cuff Size: Adult Large)   Pulse 72   Temp 99.1  F (37.3  C) (Tympanic)   Resp 18   Ht 1.803 m (5' 11\")   Wt 109.7 kg (241 lb 14.4 oz)   SpO2 95%   BMI 33.74 kg/m    Body mass index is 33.74 kg/m .  Physical Exam  Constitutional:       General: He is not in acute distress.     Appearance: Normal appearance. He is not ill-appearing.   Pulmonary:      Effort: Pulmonary effort is normal.   Musculoskeletal:      Comments: Some tenderness over the lumbar spine.  Paraspinal muscle spasm on the left.  None on the right.  No tenderness over the SI joint.  5/5 with hip flexion on the left, 4/5 with leg extension and leg flexion, 5/5 with plantarflexion and dorsiflexion on the left.  Right side is 5/5 throughout.  Reflexes are 1+ patellar and Achilles bilaterally.  " Sensation is intact to light touch.  No clonus.  Hip is slightly tender over the left hip bursa.  Has good range of motion at the internal joint.  Some pain over the low back and SI with hip abduction.  Gait is normal.     Neurological:      Mental Status: He is alert.                    Signed Electronically by: Vibha Fuchs MD

## 2025-03-06 ENCOUNTER — HOSPITAL ENCOUNTER (EMERGENCY)
Facility: HOSPITAL | Age: 35
Discharge: HOME | End: 2025-03-06
Payer: COMMERCIAL

## 2025-03-06 VITALS
HEART RATE: 61 BPM | RESPIRATION RATE: 18 BRPM | DIASTOLIC BLOOD PRESSURE: 81 MMHG | TEMPERATURE: 97.1 F | SYSTOLIC BLOOD PRESSURE: 126 MMHG | OXYGEN SATURATION: 98 %

## 2025-03-06 DIAGNOSIS — L08.9 DOG BITE OF RIGHT LOWER LEG WITH INFECTION, INITIAL ENCOUNTER: ICD-10-CM

## 2025-03-06 DIAGNOSIS — S81.851A DOG BITE OF RIGHT LOWER LEG WITH INFECTION, INITIAL ENCOUNTER: ICD-10-CM
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
## 2025-03-06 PROCEDURE — 90471 IMMUNIZATION ADMIN: CPT

## 2025-03-06 PROCEDURE — 99213 OFFICE O/P EST LOW 20 MIN: CPT

## 2025-03-06 PROCEDURE — G0463 HOSPITAL OUTPT CLINIC VISIT: HCPCS | Mod: 25

## 2025-03-06 PROCEDURE — 90715 TDAP VACCINE 7 YRS/> IM: CPT

## 2025-03-06 PROCEDURE — 250N000011 HC RX IP 250 OP 636

## 2025-03-06 RX ORDER — SULFAMETHOXAZOLE AND TRIMETHOPRIM 800; 160 MG/1; MG/1
1 TABLET ORAL 2 TIMES DAILY
Qty: 20 TABLET | Refills: 0 | Status: SHIPPED | OUTPATIENT
Start: 2025-03-06 | End: 2025-03-16

## 2025-03-06 RX ORDER — LIDOCAINE HYDROCHLORIDE AND EPINEPHRINE 10; 10 MG/ML; UG/ML
5 INJECTION, SOLUTION INFILTRATION; PERINEURAL ONCE
Status: COMPLETED | OUTPATIENT
Start: 2025-03-06 | End: 2025-03-06

## 2025-03-06 RX ADMIN — CLOSTRIDIUM TETANI TOXOID ANTIGEN (FORMALDEHYDE INACTIVATED), CORYNEBACTERIUM DIPHTHERIAE TOXOID ANTIGEN (FORMALDEHYDE INACTIVATED), BORDETELLA PERTUSSIS TOXOID ANTIGEN (GLUTARALDEHYDE INACTIVATED), BORDETELLA PERTUSSIS FILAMENTOUS HEMAGGLUTININ ANTIGEN (FORMALDEHYDE INACTIVATED), BORDETELLA PERTUSSIS PERTACTIN ANTIGEN, AND BORDETELLA PERTUSSIS FIMBRIAE 2/3 ANTIGEN 0.5 ML: 5; 2; 2.5; 5; 3; 5 INJECTION, SUSPENSION INTRAMUSCULAR at 12:37

## 2025-03-06 RX ADMIN — LIDOCAINE HYDROCHLORIDE AND EPINEPHRINE 5 ML: 10; 10 INJECTION, SOLUTION INFILTRATION; PERINEURAL at 12:37

## 2025-03-06 ASSESSMENT — COLUMBIA-SUICIDE SEVERITY RATING SCALE - C-SSRS
1. IN THE PAST MONTH, HAVE YOU WISHED YOU WERE DEAD OR WISHED YOU COULD GO TO SLEEP AND NOT WAKE UP?: NO
6. HAVE YOU EVER DONE ANYTHING, STARTED TO DO ANYTHING, OR PREPARED TO DO ANYTHING TO END YOUR LIFE?: NO
2. HAVE YOU ACTUALLY HAD ANY THOUGHTS OF KILLING YOURSELF IN THE PAST MONTH?: NO

## 2025-03-06 ASSESSMENT — ENCOUNTER SYMPTOMS
WOUND: 1
COLOR CHANGE: 1
FEVER: 0
ARTHRALGIAS: 1
APPETITE CHANGE: 0
NUMBNESS: 0
ACTIVITY CHANGE: 0

## 2025-03-06 NOTE — ED TRIAGE NOTES
Pt presents today with c/o dog bite to lower right leg      Triage Assessment (Adult)       Row Name 03/06/25 1227          Triage Assessment    Airway WDL WDL        Respiratory WDL    Respiratory WDL WDL        Peripheral/Neurovascular WDL    Peripheral Neurovascular WDL WDL

## 2025-03-06 NOTE — DISCHARGE INSTRUCTIONS
Keep wound clean and dry. Apply triple antibiotic.   Bactrim and augmentin 2 times daily for 10 days. Yogurt or probiotic while taking.   Tylenol and ibuprofen as directed if needed.   Follow up in the clinic as needed.   Return with any new or concerning symptoms as discussed.

## 2025-03-06 NOTE — ED PROVIDER NOTES
History     Chief Complaint   Patient presents with    Dog Bite     HPI  Eddi Trejo is a 35 year old male who presents to the urgent care with right lateral lower leg pain after getting bit by an unknown dog 5 days ago. He states he cleaned the wounds after the incident. Did not call police. Was not medically evaluated. Concerned today as he has been having increased pain. No fevers, numbness/tingling, or feeling unwell. Fully ambulatory. No recent abx or OTC medications.     Allergies:  Allergies   Allergen Reactions    Penicillins      Patient denies difficulty breathing with reaction. He reports a mild reaction and has previously tolerated keflex without difficulty.        Problem List:    Patient Active Problem List    Diagnosis Date Noted    Mild persistent asthma without complication 11/03/2023     Priority: Medium    Attention deficit hyperactivity disorder (ADHD), unspecified ADHD type 09/27/2023     Priority: Medium     Diagnosed 2 years ago but also diagnosed as a child - was on ritalin  Currently sees [facility]       Anxiety 09/27/2023     Priority: Medium    Mood disorder 09/27/2023     Priority: Medium     Currently sees [facility]       History of alcohol dependence (H) 09/27/2023     Priority: Medium    Medical marijuana use 09/27/2023     Priority: Medium    SOB (shortness of breath) 09/27/2023     Priority: Medium    Chronic left-sided low back pain with left-sided sciatica 09/27/2023     Priority: Medium    Spinal stenosis of lumbar region without neurogenic claudication 09/27/2023     Priority: Medium    Left leg weakness 09/27/2023     Priority: Medium        Past Medical History:    Past Medical History:   Diagnosis Date    Bronchitis 4/25/02    Headache 4/28/10    Open wound of forehead 4/28/10    Otitis 4/18/02    Schizophrenia (H) 4/28/10    URI (upper respiratory infection) 12/24/95       Past Surgical History:    Past Surgical History:    Procedure Laterality Date    ARTHROSCOPY KNEE Left 11/10/2017    Procedure: ARTHROSCOPY KNEE;  Left Knee Arthroscopy with Menical Repair;  Surgeon: Jp Pritchard DO;  Location: [facility] OR    ARTHROSCOPY KNEE Left 2019    Procedure: LEFT KNEE ARTHROSCOPY;  Surgeon: Noel Moralez MD;  Location: [facility] OR    IR CONSULTATION FOR IR EXAM  11/3/2023    ZZHC CREATE EARDRUM OPENING,GEN ANESTH      PE tubes as child       Family History:    Family History   Problem Relation Age of Onset    Asthma Mother     Colon Cancer Maternal Grandfather          at 58    Breast Cancer Maternal Grandmother     Diabetes Paternal Grandmother     Diabetes Paternal Aunt     Diabetes Maternal Aunt     C.A.D. No family hx of     Hypertension No family hx of        Social History:  Marital Status:  Single [1]  Social History     Tobacco Use    Smoking status: Never    Smokeless tobacco: Current     Types: Chew    Tobacco comments:     pt denied quit plan 19   Vaping Use    Vaping status: Never Used   Substance Use Topics    Alcohol use: Yes    Drug use: Yes     Types: Marijuana        Medications:    albuterol (PROAIR HFA/PROVENTIL HFA/VENTOLIN HFA) 108 (90 Base) MCG/ACT inhaler  amoxicillin (AMOXIL) 875 MG tablet  amphetamine-dextroamphetamine (ADDERALL) 30 MG tablet  amphetamine-dextroamphetamine (ADDERALL) 30 MG tablet  ARIPiprazole (ABILIFY) 20 MG tablet  atomoxetine (STRATTERA) 10 MG capsule  budesonide-formoterol (SYMBICORT) 80-4.5 MCG/ACT Inhaler  celecoxib (CELEBREX) 100 MG capsule  cyclobenzaprine (FLEXERIL) 5 MG tablet  escitalopram (LEXAPRO) 20 MG tablet  gabapentin (NEURONTIN) 800 MG tablet  hydrOXYzine (ATARAX) 25 MG tablet          Review of Systems   Constitutional:  Negative for activity change, appetite change and fever.   Musculoskeletal:  Positive for arthralgias.   Skin:  Positive for color change and wound.   Neurological:  Negative for numbness.   All other systems reviewed and are negative.      Physical  Exam   BP: 126/81  Pulse: 61  Temp: 97.1  F (36.2  C)  Resp: 18  SpO2: 98 %      Physical Exam  Vitals and nursing note reviewed.   Constitutional:       General: He is not in acute distress.     Appearance: Normal appearance. He is not ill-appearing or toxic-appearing.   Cardiovascular:      Rate and Rhythm: Regular rhythm.   Musculoskeletal:         General: Tenderness present. No swelling, deformity or signs of injury.   Skin:     General: Skin is warm and dry.      Capillary Refill: Capillary refill takes less than 2 seconds.      Findings: Abrasion, abscess and erythema present. No bruising.          Neurological:      General: No focal deficit present.      Mental Status: He is alert and oriented to person, place, and time.   Psychiatric:         Mood and Affect: Mood normal.         Behavior: Behavior normal.         Thought Content: Thought content normal.         Judgment: Judgment normal.         ED Course        [facility]    PROCEDURE: -Incision/Drainage    Date/Time: 3/6/2025 1:12 PM    Performed by: Berenice Troncoso NP  Authorized by: Berenice Troncoso NP    Risks, benefits and alternatives discussed.      LOCATION:      Type:  Abscess    Size:  1    Location:  Lower extremity    Lower extremity location:  Leg    Leg location:  R lower leg    PRE-PROCEDURE DETAILS:     Skin preparation:  Betadine    PROCEDURE TYPE:     Complexity:  Simple    ANESTHESIA (see MAR for exact dosages):     Anesthesia method:  Local infiltration    Local anesthetic:  Lidocaine 1% WITH epi    PROCEDURE DETAILS:     Incision types:  Stab incision (with 25G needle)    Incision depth:  Dermal    Drainage:  Purulent    Drainage amount:  Scant    Wound treatment:  Wound left open    Packing materials:  None    PROCEDURE    Patient Tolerance:  Patient tolerated the procedure well with no immediate complications         No results found for this or any previous visit (from the past 24 hours).    Medications   Tdap  (tetanus-diphtheria-acell pertussis) (ADACEL) injection 0.5 mL (has no administration in time range)   lidocaine 1% with EPINEPHrine 1:100,000 injection 5 mL (has no administration in time range)       Assessments & Plan (with Medical Decision Making)     I have reviewed the nursing notes.    I have reviewed the findings, diagnosis, plan and need for follow up with the patient.  Eddi Trejo is a 35 year old male who presents to the urgent care with right lateral lower leg pain after getting bit by an unknown dog 5 days ago. He states he cleaned the wounds after the incident. Did not call police. Was not medically evaluated. Concerned today as he has been having increased pain. No fevers, numbness/tingling, or feeling unwell. Fully ambulatory. No recent abx or OTC medications.     MDM: vital signs normal, afebrile. Non toxic in appearance with no noted distress. Fully ambulatory. Two approximate 5mm open areas to right lateral lower leg. The more proximal area has 2cm of surrounding redness with mild palpable fluctuance. Stab incision with 25G needle performed with scant purulent drainage. Triple antibiotic and dressing applied. Tdap updated. Discussed rabies prophylaxis, which he declines at this time. He is aware of the risks. Will treat with agumentin to cover both anaerobic and aerobic bacteria along with bactrim as there is concern for possible MRSA with the purulent cellulitis. Encouraged close follow up. Supportive measures and strict return precautions discussed. He is in agreement with plan.     (S81.851A,  L08.9,  W54.0XXA) Dog bite of right lower leg with infection, initial encounter  Plan: Keep wound clean and dry. Apply triple antibiotic.   Bactrim and augmentin 2 times daily for 10 days. Yogurt or probiotic while taking.   Tylenol and ibuprofen as directed if needed.   Follow up in the clinic as needed.   Return with any new or concerning symptoms as discussed. Understanding verbalized.      New Prescriptions    No medications on file       Final diagnoses:   Dog bite of right lower leg with infection, initial encounter       3/6/2025   [facility]       Berenice Troncoso, NP  03/06/25 0804

## 2025-03-21 ENCOUNTER — HOSPITAL ENCOUNTER (OUTPATIENT)
Dept: MRI IMAGING | Facility: HOSPITAL | Age: 35
Discharge: HOME | End: 2025-03-21
Attending: STUDENT IN AN ORGANIZED HEALTH CARE EDUCATION/TRAINING PROGRAM | Admitting: STUDENT IN AN ORGANIZED HEALTH CARE EDUCATION/TRAINING PROGRAM
Payer: COMMERCIAL

## 2025-03-21 DIAGNOSIS — M54.42 CHRONIC LEFT-SIDED LOW BACK PAIN WITH LEFT-SIDED SCIATICA: ICD-10-CM

## 2025-03-21 DIAGNOSIS — G89.29 CHRONIC LEFT-SIDED LOW BACK PAIN WITH LEFT-SIDED SCIATICA: ICD-10-CM

## 2025-03-21 DIAGNOSIS — M48.061 SPINAL STENOSIS OF LUMBAR REGION WITHOUT NEUROGENIC CLAUDICATION: Chronic | ICD-10-CM

## 2025-03-21 PROCEDURE — 72148 MRI LUMBAR SPINE W/O DYE: CPT

## 2025-03-25 ENCOUNTER — THERAPY VISIT (OUTPATIENT)
Dept: PHYSICAL THERAPY | Facility: HOSPITAL | Age: 35
End: 2025-03-25
Attending: STUDENT IN AN ORGANIZED HEALTH CARE EDUCATION/TRAINING PROGRAM
Payer: COMMERCIAL

## 2025-03-25 DIAGNOSIS — G89.29 CHRONIC LEFT-SIDED LOW BACK PAIN WITH LEFT-SIDED SCIATICA: ICD-10-CM

## 2025-03-25 DIAGNOSIS — M48.061 SPINAL STENOSIS OF LUMBAR REGION WITHOUT NEUROGENIC CLAUDICATION: Chronic | ICD-10-CM

## 2025-03-25 DIAGNOSIS — M54.42 CHRONIC LEFT-SIDED LOW BACK PAIN WITH LEFT-SIDED SCIATICA: ICD-10-CM

## 2025-03-25 PROCEDURE — 97530 THERAPEUTIC ACTIVITIES: CPT | Mod: GP

## 2025-03-25 PROCEDURE — 97161 PT EVAL LOW COMPLEX 20 MIN: CPT | Mod: GP

## 2025-03-25 NOTE — PROGRESS NOTES
"PHYSICAL THERAPY EVALUATION  Type of Visit: Evaluation       Fall Risk Screen:  Fall screen completed by: PT  Have you fallen 2 or more times in the past year?: No  Have you fallen and had an injury in the past year?: No  Is patient a fall risk?: No    Subjective         Presenting condition or subjective complaint: lower left back is always in pain if it be severe or mild i cant seem to get confrontable    Patient is a 35-year-old male who presents to outpatient physical therapy with reports of chronic back pain.  Patient states that back pain has been present for at least last 3 and half years.  Patient reports that his pain began randomly because he was sleeping on an air mattress 1 night that deflated over the night and when he woke up his back was hurting.  Patient notes that his back pain increases during the day, and increases with sitting or laying on his back.  Endorses that his pain decreases with standing or walking.  Patient currently works as a labor and notes this seems to increase his pain.  Mentions that at times he feels like something is being pinched in his back.  Notes that he has \"numbness\" going down the outside of his left leg to his knee; denies any symptoms into right leg.  Denies bladder or bowel changes.  Does feel like he is slightly weaker on his left side and says he cannot  is much as he used to be able to.      Date of onset: 03/20/25 (Date of Order)    Relevant medical history: Asthma   Past Medical History:   Diagnosis Date    Bronchitis 4/25/02    Headache 4/28/10    Open wound of forehead 4/28/10    Otitis 4/18/02    Schizophrenia (H) 4/28/10    URI (upper respiratory infection) 12/24/95       Dates & types of surgery:      Prior diagnostic imaging/testing results: MRI; X-ray     PROCEDURE: MR LUMBAR SPINE W/O CONTRAST 3/21/2025 12:11 PM    HISTORY: Spinal stenosis of lumbar region without neurogenic  claudication; Chronic left-sided low back pain with left-sided  sciatica; " Chronic left-sided low back pain with left-sided sciatica    COMPARISONS: None.    Meds/Dose Given: Gadavist 7.5 mL    TECHNIQUE: Images were obtained sagittally T1 STIR and T2-weighted  images were obtained axially T2 weighted    FINDINGS: The T12-L1 and L1-L2 discs are normal.    At L2-L3 there is broad-based annular bulging mildly flattening the  ventral aspect of the subarachnoid space. No nerve root compression is  seen.    At L3-L4 there is broad-based annular bulging greater on the left than  the right. There are developmentally short pedicles. There is mild  central spinal stenosis. There is moderate compression of the left L4  nerve root in the lateral recess. There is mild compression of the  left L3 nerve root in the neural foramina.    At L4-L5 there is broad-based annular bulging. This is greater on the  left than the right. There is mild compression of the left L5 nerve  root in the lateral recess and is mild impingement on the right L4  nerve root in the neural foramina.    At L5-S1 there is  annular bulging. Mild compression of the ventral  aspect of the thecal sac. There is moderate narrowing of the neural  foramina of the left L5 nerve root compressing the left L5 nerve root.  The facet joints appear normal.    Intradurally the nerves of the cauda equina and conus are normal.    The paravertebral soft tissues are normal.       Impression:     IMPRESSION: Multilevel nerve root compressions as described above    KAMALJIT MARTEL MD     Prior therapy history for the same diagnosis, illness or injury: No        Prior Level of Function  Transfers: Independent  Ambulation: Independent  ADL: Independent    Living Environment  Social support: With a significant other or spouse   Type of home: House   Stairs to enter the home: Yes 5 Is there a railing: Yes     Ramp: No   Stairs inside the home: No       Help at home: Medication and/or finances  Equipment owned:       Employment: Yes    Hobbies/Interests: Part[employer]    Patient goals for therapy: have active life again without severe pain    Pain assessment: See objective evaluation for additional pain details       Objective   LUMBAR SPINE EVALUATION  PAIN: Pain Level at Rest: 4/10  Pain Level with Use: 6/10  Pain Quality: Numb, Pressure, and pinching  Pain is Exacerbated By: Use (as day progresses) and sitting or laying down  Pain is Relieved By: use  INTEGUMENTARY (edema, incisions): WFL  POSTURE: Sitting Posture: Rounded shoulders, Forward head  GAIT:   Weightbearing Status: WBAT  Assistive Device(s): None  Gait Deviations: WFL  BALANCE/PROPRIOCEPTION: WFL  ROM:  Slightly decreased lumbar ROM globally  PELVIC/SI SCREEN: Machelle (March): Negative  STRENGTH:  Grossly 5-/5 on R and 4/5 on L  MYOTOMES: WNL  DTR S: WNL  DERMATOMES: WNL  LUMBAR/HIP Special Tests:    Left Right   CISCO Positive Positive   FADIR/Labrum/CLEMENTE Negative  Negative    Femoral Nerve Negative  Negative    Ghanshyam's Negative  Negative    Piriformis Positive Positive   Quadrant Testing Negative  Negative    SLR Positive Positive   Slump Negative  Negative       PALPATION:  Tenderness in B QL and B piriformis, both L>R    Assessment & Plan   CLINICAL IMPRESSIONS  Medical Diagnosis: M48.061 (ICD-10-CM) - Spinal stenosis of lumbar region without neurogenic claudication  M54.42, G89.29 (ICD-10-CM) - Chronic left-sided low back pain with left-sided sciatica    Treatment Diagnosis: LBP, lumbar radiculopathy   Impression/Assessment: Patient is a 35 year old male with LBP complaints.  The following significant findings have been identified: Pain, Decreased ROM/flexibility, Decreased joint mobility, Decreased strength, Impaired muscle performance, Decreased activity tolerance, and Impaired posture. These impairments interfere with their ability to perform self care tasks, work tasks, recreational activities, household chores, driving , and community mobility as compared to  previous level of function.     Clinical Decision Making (Complexity):  Clinical Presentation: Stable/Uncomplicated  Clinical Presentation Rationale: based on medical and personal factors listed in PT evaluation  Clinical Decision Making (Complexity): Low complexity    PLAN OF CARE  Treatment Interventions:  Modalities: Cryotherapy, E-stim, Mechanical Traction  Interventions: Gait Training, Manual Therapy, Neuromuscular Re-education, Therapeutic Activity, Therapeutic Exercise, Self-Care/Home Management, Aquatic Therapy    Long Term Goals     PT Goal 1  Goal Identifier: Short Term  Goal Description: Pt will be indep in HEP for decreased pain and improved strength and proprioception for decreased pain with functional mobility and decreased risk for additional injury.  Target Date: 04/22/25  PT Goal 2  Goal Identifier: Long Term 1  Goal Description: The patient s low back pain will reduce by 25% to encourage uninterrupted sleep for 6 hours.  Target Date: 05/06/25  PT Goal 3  Goal Identifier: Long Term 2  Goal Description: The patient will be able to perform a correct   squat with correct spine alignment and body mechanics to reduce the likelihood of re-injury when lifting object off the floor to move work supplies or to  laundry baskets.  Target Date: 06/03/25      Frequency of Treatment: 1x/week  Duration of Treatment: 10 weeks    Recommended Referrals to Other Professionals:   Education Assessment:   Learner/Method: Patient;Listening;Reading;Demonstration;Pictures/Video;No Barriers to Learning    Risks and benefits of evaluation/treatment have been explained.   Patient/Family/caregiver agrees with Plan of Care.     Evaluation Time:     PT Eval, Low Complexity Minutes (20167): 20       Signing Clinician: William Mclain PT        [facility]                                                                                   OUTPATIENT PHYSICAL THERAPY      PLAN OF TREATMENT FOR  OUTPATIENT REHABILITATION   Patient's Last Name, First Name, Eddi Hou YOB: 1990   Provider's Name   [facility]   Medical Record No.  [id number]     Onset Date: 03/20/25 (Date of Order)  Start of Care Date: 03/25/25     Medical Diagnosis:  M48.061 (ICD-10-CM) - Spinal stenosis of lumbar region without neurogenic claudication  M54.42, G89.29 (ICD-10-CM) - Chronic left-sided low back pain with left-sided sciatica      PT Treatment Diagnosis:  LBP, lumbar radiculopathy Plan of Treatment  Frequency/Duration: 1x/week/ 10 weeks    Certification date from 03/25/25 to 06/03/25         See note for plan of treatment details and functional goals     William CHANTAL Mclain, PT                         I CERTIFY THE NEED FOR THESE SERVICES FURNISHED UNDER        THIS PLAN OF TREATMENT AND WHILE UNDER MY CARE     (Physician attestation of this document indicates review and certification of the therapy plan).              Referring Provider:  Vibha Fuchs    Initial Assessment  See Epic Evaluation- Start of Care Date: 03/25/25

## 2025-04-10 ENCOUNTER — THERAPY VISIT (OUTPATIENT)
Dept: PHYSICAL THERAPY | Facility: HOSPITAL | Age: 35
End: 2025-04-10
Attending: STUDENT IN AN ORGANIZED HEALTH CARE EDUCATION/TRAINING PROGRAM
Payer: COMMERCIAL

## 2025-04-10 DIAGNOSIS — M54.42 CHRONIC LEFT-SIDED LOW BACK PAIN WITH LEFT-SIDED SCIATICA: ICD-10-CM

## 2025-04-10 DIAGNOSIS — M48.061 SPINAL STENOSIS OF LUMBAR REGION WITHOUT NEUROGENIC CLAUDICATION: Primary | ICD-10-CM

## 2025-04-10 DIAGNOSIS — G89.29 CHRONIC LEFT-SIDED LOW BACK PAIN WITH LEFT-SIDED SCIATICA: ICD-10-CM

## 2025-04-10 PROCEDURE — 97110 THERAPEUTIC EXERCISES: CPT | Mod: GP

## 2025-05-07 ENCOUNTER — HOSPITAL ENCOUNTER (EMERGENCY)
Facility: HOSPITAL | Age: 35
End: 2025-05-07
Payer: COMMERCIAL

## 2025-05-07 PROCEDURE — 99283 EMERGENCY DEPT VISIT LOW MDM: CPT | Performed by: INTERNAL MEDICINE

## 2025-05-07 RX ORDER — DEXTROAMPHETAMINE SACCHARATE, AMPHETAMINE ASPARTATE, DEXTROAMPHETAMINE SULFATE AND AMPHETAMINE SULFATE 7.5; 7.5; 7.5; 7.5 MG/1; MG/1; MG/1; MG/1
30 TABLET ORAL 2 TIMES DAILY
COMMUNITY

## 2025-05-07 RX ORDER — GABAPENTIN 800 MG/1
800 TABLET ORAL DAILY
COMMUNITY

## 2025-05-07 RX ORDER — DEXTROAMPHETAMINE SACCHARATE, AMPHETAMINE ASPARTATE MONOHYDRATE, DEXTROAMPHETAMINE SULFATE AND AMPHETAMINE SULFATE 7.5; 7.5; 7.5; 7.5 MG/1; MG/1; MG/1; MG/1
30 CAPSULE, EXTENDED RELEASE ORAL EVERY MORNING
COMMUNITY

## 2025-05-07 RX ORDER — CYCLOBENZAPRINE HCL 10 MG
10 TABLET ORAL DAILY
COMMUNITY

## 2025-05-08 ENCOUNTER — HOSPITAL ENCOUNTER (EMERGENCY)
Facility: HOSPITAL | Age: 35
Discharge: HOME | End: 2025-05-08
Attending: INTERNAL MEDICINE
Payer: COMMERCIAL

## 2025-05-08 VITALS
DIASTOLIC BLOOD PRESSURE: 80 MMHG | HEART RATE: 64 BPM | SYSTOLIC BLOOD PRESSURE: 131 MMHG | TEMPERATURE: 98.3 F | WEIGHT: 225 LBS | RESPIRATION RATE: 18 BRPM | OXYGEN SATURATION: 100 % | HEIGHT: 71 IN | BODY MASS INDEX: 31.5 KG/M2

## 2025-05-08 DIAGNOSIS — U07.1 COVID-19 VIRUS INFECTION: ICD-10-CM

## 2025-05-08 LAB
FLUAV RNA SPEC QL NAA+PROBE: NEGATIVE
FLUBV RNA RESP QL NAA+PROBE: NEGATIVE
RSV RNA SPEC NAA+PROBE: NEGATIVE
S PYO DNA THROAT QL NAA+PROBE: NOT DETECTED
SARS-COV-2 RNA RESP QL NAA+PROBE: POSITIVE

## 2025-05-08 PROCEDURE — 87651 STREP A DNA AMP PROBE: CPT | Performed by: INTERNAL MEDICINE

## 2025-05-08 PROCEDURE — 87637 SARSCOV2&INF A&B&RSV AMP PRB: CPT | Performed by: INTERNAL MEDICINE

## 2025-05-08 ASSESSMENT — ACTIVITIES OF DAILY LIVING (ADL): ADLS_ACUITY_SCORE: 41

## 2025-05-08 NOTE — ED NOTES
"Patient presents with complaints of congestion and \"I can't breath\" He states he's been sick for 2 days and was out of town so that's why he waited until today to come in. He states he can't swallow either but he is controlling his secretions.   "

## 2025-05-08 NOTE — ED TRIAGE NOTES
"Patient reports waking two days ago with the right side of his nose swollen and uncomfortable. Patient states it now is his left side as well. Patient reports seeing \"skin colored things come out when I blow my nose.\" Patient reports SOB. Patient 97% SpO2 in triage. Nurse notes non-labored RR of 18 with equal chest rise and fall. No accessory muscles used. Patient able to talk without interruption. Patient denies any trauma to nose. No history of previous nasal inhalation of drugs.      Triage Assessment (Adult)       Row Name 05/07/25 6750          Triage Assessment    Airway WDL WDL        Respiratory WDL    Respiratory WDL WDL;rhythm/pattern;expansion/retractions     Rhythm/Pattern, Respiratory shortness of breath;depth regular;pattern regular;unlabored     Expansion/Accessory Muscles/Retractions expansion symmetric;no retractions;no use of accessory muscles        Cardiac WDL    Cardiac WDL WDL        Cognitive/Neuro/Behavioral WDL    Cognitive/Neuro/Behavioral WDL WDL                     "

## 2025-05-08 NOTE — ED NOTES
Patient upset at the provider and yelling stating that COVID can't cause his symptoms. He stormed out of the department yelling that if he dies from something else he's going to staci the hosiptal and all the staff.

## 2025-05-11 ASSESSMENT — ENCOUNTER SYMPTOMS
WEAKNESS: 0
CHEST TIGHTNESS: 0
MYALGIAS: 1
SHORTNESS OF BREATH: 1
ABDOMINAL DISTENTION: 0
HEADACHES: 0
FEVER: 1
NECK PAIN: 0
ANAL BLEEDING: 0
SINUS PRESSURE: 1
PALPITATIONS: 0
CHILLS: 0
DIAPHORESIS: 0
ABDOMINAL PAIN: 0
SORE THROAT: 1
BLOOD IN STOOL: 0
VOMITING: 0
WHEEZING: 0
NAUSEA: 0
FREQUENCY: 0
COUGH: 1
FLANK PAIN: 0
LIGHT-HEADEDNESS: 0
FLU SYMPTOMS: 1
BACK PAIN: 0
FACIAL SWELLING: 1
DYSURIA: 0
SLEEP DISTURBANCE: 0
DIZZINESS: 0
COLOR CHANGE: 0
CONFUSION: 0
VOICE CHANGE: 0
NUMBNESS: 0

## 2025-05-11 NOTE — ED PROVIDER NOTES
History     Chief Complaint   Patient presents with    Facial Swelling     The history is provided by the patient.   Flu Symptoms  Presenting symptoms: cough, fever, myalgias, shortness of breath and sore throat    Presenting symptoms: no headaches, no nausea and no vomiting    Severity:  Severe  Onset quality:  Gradual  Duration:  3 days  Progression:  Worsening  Chronicity:  New  Associated symptoms: no chills          Allergies:  Allergies   Allergen Reactions    Penicillins      Patient denies difficulty breathing with reaction. He reports a mild reaction and has previously tolerated keflex without difficulty.        Problem List:    Patient Active Problem List    Diagnosis Date Noted    Mild persistent asthma without complication 11/03/2023     Priority: Medium    Attention deficit hyperactivity disorder (ADHD), unspecified ADHD type 09/27/2023     Priority: Medium     Diagnosed 2 years ago but also diagnosed as a child - was on ritalin  Currently sees [facility]       Anxiety 09/27/2023     Priority: Medium    Mood disorder 09/27/2023     Priority: Medium     Currently sees [facility]       History of alcohol dependence (H) 09/27/2023     Priority: Medium    Medical marijuana use 09/27/2023     Priority: Medium    SOB (shortness of breath) 09/27/2023     Priority: Medium    Chronic left-sided low back pain with left-sided sciatica 09/27/2023     Priority: Medium    Spinal stenosis of lumbar region without neurogenic claudication 09/27/2023     Priority: Medium    Left leg weakness 09/27/2023     Priority: Medium        Past Medical History:    Past Medical History:   Diagnosis Date    Bronchitis 4/25/02    Headache 4/28/10    Open wound of forehead 4/28/10    Otitis 4/18/02    Schizophrenia (H) 4/28/10    URI (upper respiratory infection) 12/24/95       Past Surgical History:    Past Surgical History:   Procedure Laterality Date    ARTHROSCOPY KNEE Left 11/10/2017     Procedure: ARTHROSCOPY KNEE;  Left Knee Arthroscopy with Menical Repair;  Surgeon: Jp Pritchard DO;  Location: [facility] OR    ARTHROSCOPY KNEE Left 2019    Procedure: LEFT KNEE ARTHROSCOPY;  Surgeon: Noel Moralez MD;  Location: [facility] OR    IR CONSULTATION FOR IR EXAM  11/3/2023    ZZHC CREATE EARDRUM OPENING,GEN ANESTH      PE tubes as child       Family History:    Family History   Problem Relation Age of Onset    Asthma Mother     Colon Cancer Maternal Grandfather          at 58    Breast Cancer Maternal Grandmother     Diabetes Paternal Grandmother     Diabetes Paternal Aunt     Diabetes Maternal Aunt     C.A.D. No family hx of     Hypertension No family hx of        Social History:  Marital Status:  Single [1]  Social History     Tobacco Use    Smoking status: Never    Smokeless tobacco: Current     Types: Chew    Tobacco comments:     pt denied quit plan 19   Vaping Use    Vaping status: Never Used   Substance Use Topics    Alcohol use: Yes    Drug use: Yes     Types: Marijuana        Medications:    amphetamine-dextroamphetamine (ADDERALL XR) 30 MG 24 hr capsule  amphetamine-dextroamphetamine (ADDERALL) 30 MG tablet  cyclobenzaprine (FLEXERIL) 10 MG tablet  gabapentin (NEURONTIN) 800 MG tablet  albuterol (PROAIR HFA/PROVENTIL HFA/VENTOLIN HFA) 108 (90 Base) MCG/ACT inhaler  amoxicillin (AMOXIL) 875 MG tablet  amphetamine-dextroamphetamine (ADDERALL) 30 MG tablet  amphetamine-dextroamphetamine (ADDERALL) 30 MG tablet  ARIPiprazole (ABILIFY) 20 MG tablet  atomoxetine (STRATTERA) 10 MG capsule  budesonide-formoterol (SYMBICORT) 80-4.5 MCG/ACT Inhaler  celecoxib (CELEBREX) 100 MG capsule  cyclobenzaprine (FLEXERIL) 5 MG tablet  escitalopram (LEXAPRO) 20 MG tablet  gabapentin (NEURONTIN) 800 MG tablet  hydrOXYzine (ATARAX) 25 MG tablet          Review of Systems   Constitutional:  Positive for fever. Negative for chills and diaphoresis.   HENT:  Positive for facial swelling, sinus pressure and sore  "throat. Negative for voice change.    Eyes:  Negative for visual disturbance.   Respiratory:  Positive for cough and shortness of breath. Negative for chest tightness and wheezing.    Cardiovascular:  Negative for chest pain, palpitations and leg swelling.   Gastrointestinal:  Negative for abdominal distention, abdominal pain, anal bleeding, blood in stool, nausea and vomiting.   Genitourinary:  Negative for decreased urine volume, dysuria, flank pain and frequency.   Musculoskeletal:  Positive for myalgias. Negative for back pain, gait problem and neck pain.   Skin:  Negative for color change, pallor and rash.   Neurological:  Negative for dizziness, syncope, weakness, light-headedness, numbness and headaches.   Psychiatric/Behavioral:  Negative for confusion, sleep disturbance and suicidal ideas.        Physical Exam   BP: (!) 169/93  Pulse: 83  Temp: 98.3  F (36.8  C)  Resp: 18  Height: 179.1 cm (5' 10.5\")  Weight: 102.1 kg (225 lb)  SpO2: 97 %      Physical Exam  Vitals and nursing note reviewed.   Constitutional:       Appearance: He is well-developed.   HENT:      Head: Normocephalic and atraumatic.   Eyes:      Conjunctiva/sclera: Conjunctivae normal.      Pupils: Pupils are equal, round, and reactive to light.   Neck:      Thyroid: No thyromegaly.      Vascular: No JVD.      Trachea: No tracheal deviation.   Cardiovascular:      Rate and Rhythm: Normal rate and regular rhythm.      Heart sounds: Normal heart sounds. No murmur heard.     No gallop.   Pulmonary:      Effort: Pulmonary effort is normal. No respiratory distress.      Breath sounds: Normal breath sounds. No stridor. No wheezing or rales.   Chest:      Chest wall: No tenderness.   Abdominal:      General: Bowel sounds are normal. There is no distension.      Palpations: Abdomen is soft. There is no mass.      Tenderness: There is no abdominal tenderness. There is no guarding or rebound.   Musculoskeletal:         General: No tenderness. Normal " range of motion.      Cervical back: Normal range of motion and neck supple.   Lymphadenopathy:      Cervical: No cervical adenopathy.   Skin:     General: Skin is warm.      Coloration: Skin is not pale.      Findings: No erythema or rash.   Neurological:      Mental Status: He is alert and oriented to person, place, and time.   Psychiatric:         Behavior: Behavior normal.         ED Course        Procedures                No results found for this or any previous visit (from the past 24 hours).    Medications - No data to display    Assessments & Plan (with Medical Decision Making)   Flu like symptoms with feeling burn and pressure in his sinuses and throat  COVID test positive  When I explained that COVID can cause symptoms , he became very upset , yelling and walked out of ER without waiting for proper discharge instruction.   I have reviewed the nursing notes.    I have reviewed the findings, diagnosis, plan and need for follow up with the patient.        Discharge Medication List as of 5/8/2025  2:50 AM          Final diagnoses:   COVID-19 virus infection       5/7/2025   [facility]       Ganga Lambert MD  05/11/25 022

## 2025-05-14 DIAGNOSIS — G89.29 CHRONIC LEFT-SIDED LOW BACK PAIN WITH LEFT-SIDED SCIATICA: ICD-10-CM

## 2025-05-14 DIAGNOSIS — M54.42 CHRONIC LEFT-SIDED LOW BACK PAIN WITH LEFT-SIDED SCIATICA: ICD-10-CM

## 2025-05-14 RX ORDER — CYCLOBENZAPRINE HCL 5 MG
TABLET ORAL
Qty: 30 TABLET | Refills: 1 | Status: SHIPPED | OUTPATIENT
Start: 2025-05-14

## 2025-05-14 NOTE — TELEPHONE ENCOUNTER
cyclobenzaprine (FLEXERIL) 5 MG tablet       Last Written Prescription Date:  3/21/2025  Last Fill Quantity: 30,   # refills: 1  Last Office Visit: 3/3/2025

## (undated) DEVICE — CANISTER-SUCTION 2000CC

## (undated) DEVICE — BDG-ELASTIC 6 INCH DBL. STERILE

## (undated) DEVICE — TUBING-ARTHROSCOPY-INFLOW

## (undated) DEVICE — CUFF-DISP STERILE 30IN SINGLE BLADDER

## (undated) DEVICE — IRRIGATION-NACL 3000ML (BAG)

## (undated) DEVICE — LIGHT HANDLE COVER

## (undated) DEVICE — IMMOB-KNEE 20 INCH

## (undated) DEVICE — BIN-ARTHROSCOPY CART

## (undated) DEVICE — CAST PADDING-STERILE 6"

## (undated) DEVICE — SENSOR-OXISENSOR II ADULT

## (undated) DEVICE — NDL-SPINAL 22G X 3.5IN QUINCKE

## (undated) DEVICE — SUTURE-ETHILON 4-0 FS-1 1629H

## (undated) DEVICE — PACK-KNEE ARTHROSCOPY-CUSTOM

## (undated) DEVICE — GLV-9.0 PROTEXIS PI CLASSIC LF/PF

## (undated) DEVICE — IRRIGATION-NACL 1000ML

## (undated) DEVICE — DRAPE-U DRAPE SPLIT SHEET 72" X 122"

## (undated) DEVICE — GOWN-SURG XXL LVL 3 REINFORCED

## (undated) DEVICE — BDG-STAT STRIP

## (undated) DEVICE — BLADE-DISSECTOR AR-8400DS

## (undated) DEVICE — IRRIGATION-H2O 1000ML

## (undated) DEVICE — APPLICATOR-CHLORAPREP 26ML TINTED CHG 2%+ 70% IPA-SURGICAL

## (undated) DEVICE — WAND-APOLLO RF 50 MULTIPORT

## (undated) DEVICE — Device

## (undated) DEVICE — GLV-8.5 ORTHO PROTEXIS PI LF/PF

## (undated) DEVICE — SUTURE-MONOCRYL 4-0 PS-2 Y496G

## (undated) DEVICE — GLV-8.5 PROTEXIS PI CLASSIC LF/PF

## (undated) DEVICE — TOWEL-OR DISP 4PKS

## (undated) DEVICE — NDL-22G X 1 1/2" NON-SAFETY

## (undated) DEVICE — DRAPE-U DRAPE PLASTIC SPLIT SHEET 60" X 72"

## (undated) DEVICE — BLADE-SCALPEL #11

## (undated) DEVICE — NDL-BLUNT FILL 18G 1.5"

## (undated) DEVICE — STERI-STRIP-1/2" X 4"

## (undated) DEVICE — LABEL-STERILE PREPRINTED FOR OR

## (undated) RX ORDER — ONDANSETRON 2 MG/ML
INJECTION INTRAMUSCULAR; INTRAVENOUS
Status: DISPENSED
Start: 2017-11-10

## (undated) RX ORDER — FENTANYL CITRATE 50 UG/ML
INJECTION, SOLUTION INTRAMUSCULAR; INTRAVENOUS
Status: DISPENSED
Start: 2017-11-10

## (undated) RX ORDER — PROPOFOL 10 MG/ML
INJECTION, EMULSION INTRAVENOUS
Status: DISPENSED
Start: 2017-11-10

## (undated) RX ORDER — LIDOCAINE HYDROCHLORIDE 20 MG/ML
INJECTION, SOLUTION EPIDURAL; INFILTRATION; INTRACAUDAL; PERINEURAL
Status: DISPENSED
Start: 2019-04-01

## (undated) RX ORDER — KETOROLAC TROMETHAMINE 30 MG/ML
INJECTION, SOLUTION INTRAMUSCULAR; INTRAVENOUS
Status: DISPENSED
Start: 2019-04-01

## (undated) RX ORDER — KETOROLAC TROMETHAMINE 30 MG/ML
INJECTION, SOLUTION INTRAMUSCULAR; INTRAVENOUS
Status: DISPENSED
Start: 2017-11-10

## (undated) RX ORDER — LIDOCAINE HYDROCHLORIDE 20 MG/ML
INJECTION, SOLUTION EPIDURAL; INFILTRATION; INTRACAUDAL; PERINEURAL
Status: DISPENSED
Start: 2017-11-10

## (undated) RX ORDER — ROCURONIUM BROMIDE 50 MG/5 ML
SYRINGE (ML) INTRAVENOUS
Status: DISPENSED
Start: 2017-11-10

## (undated) RX ORDER — PROPOFOL 10 MG/ML
INJECTION, EMULSION INTRAVENOUS
Status: DISPENSED
Start: 2019-04-01

## (undated) RX ORDER — DEXAMETHASONE SODIUM PHOSPHATE 10 MG/ML
INJECTION, SOLUTION INTRAMUSCULAR; INTRAVENOUS
Status: DISPENSED
Start: 2019-04-01

## (undated) RX ORDER — ONDANSETRON 2 MG/ML
INJECTION INTRAMUSCULAR; INTRAVENOUS
Status: DISPENSED
Start: 2019-04-01

## (undated) RX ORDER — FENTANYL CITRATE 50 UG/ML
INJECTION, SOLUTION INTRAMUSCULAR; INTRAVENOUS
Status: DISPENSED
Start: 2019-04-01